# Patient Record
Sex: MALE | Race: WHITE | NOT HISPANIC OR LATINO | Employment: OTHER | URBAN - METROPOLITAN AREA
[De-identification: names, ages, dates, MRNs, and addresses within clinical notes are randomized per-mention and may not be internally consistent; named-entity substitution may affect disease eponyms.]

---

## 2020-09-01 ENCOUNTER — OFFICE VISIT (OUTPATIENT)
Dept: PODIATRY | Facility: CLINIC | Age: 55
End: 2020-09-01
Payer: COMMERCIAL

## 2020-09-01 VITALS — RESPIRATION RATE: 16 BRPM | HEIGHT: 70 IN | BODY MASS INDEX: 34.36 KG/M2 | WEIGHT: 240 LBS

## 2020-09-01 DIAGNOSIS — M21.961 ACQUIRED DEFORMITY OF BOTH FEET: Primary | ICD-10-CM

## 2020-09-01 DIAGNOSIS — M21.41 ACQUIRED FLAT FOOT, RIGHT: ICD-10-CM

## 2020-09-01 DIAGNOSIS — M21.962 ACQUIRED DEFORMITY OF BOTH FEET: Primary | ICD-10-CM

## 2020-09-01 DIAGNOSIS — M25.571 ARTHRALGIA OF RIGHT FOOT: ICD-10-CM

## 2020-09-01 DIAGNOSIS — M21.42 ACQUIRED FLAT FOOT, LEFT: ICD-10-CM

## 2020-09-01 PROCEDURE — 99203 OFFICE O/P NEW LOW 30 MIN: CPT | Performed by: PODIATRIST

## 2020-09-01 PROCEDURE — L3000 FT INSERT UCB BERKELEY SHELL: HCPCS | Performed by: PODIATRIST

## 2020-09-01 PROCEDURE — 73620 X-RAY EXAM OF FOOT: CPT | Performed by: PODIATRIST

## 2020-09-01 RX ORDER — MELOXICAM 7.5 MG/1
7.5 TABLET ORAL DAILY
Qty: 10 TABLET | Refills: 0 | Status: SHIPPED | OUTPATIENT
Start: 2020-09-01 | End: 2021-11-15 | Stop reason: ALTCHOICE

## 2020-09-01 NOTE — PROGRESS NOTES
Assessment/Plan:  Metatarsalgia  Arthrosis  Pronation syndrome  Pain upon ambulation  Right 1st MPJ osteoarthritis  Plan  Foot exam performed  X-rays taken  Patient educated on condition  Patient be placed on Mobic  His feet have been casted for custom molded foot orthotics         Diagnoses and all orders for this visit:    Acquired deformity of both feet    Acquired flat foot, right    Acquired flat foot, left    Arthralgia of right foot  -     meloxicam (MOBIC) 7 5 mg tablet; Take 1 tablet (7 5 mg total) by mouth daily for 10 days          Subjective:  Patient presents for evaluation treatment of painful right foot  He has stiffness in his big toe joint  However over the last several weeks has had increasing pain  He also gets pain in his legs when he walks  No Known Allergies      Current Outpatient Medications:     meloxicam (MOBIC) 7 5 mg tablet, Take 1 tablet (7 5 mg total) by mouth daily for 10 days, Disp: 10 tablet, Rfl: 0    There is no problem list on file for this patient  Patient ID: Opal Felton is a 60 y o  male  HPI    The following portions of the patient's history were reviewed and updated as appropriate:     family history is not on file  reports that he has never smoked  He has never used smokeless tobacco  No history on file for alcohol and drug  Vitals:    09/01/20 0940   Resp: 16       Review of Systems      Objective:  Patient's shoes and socks removed     Foot Exam    General  General Appearance: appears stated age and healthy   Orientation: alert and oriented to person, place, and time   Affect: appropriate   Gait: antalgic       Right Foot/Ankle     Inspection and Palpation  Ecchymosis: none  Tenderness: bony tenderness, great toe metatarsophalangeal joint, lesser metatarsophalangeal joints and metatarsals   Swelling: dorsum and metatarsals   Arch: pes planus  Hammertoes: fifth toe  Hallux valgus: no  Hallux limitus: yes  Skin Exam: dry skin; Neurovascular  Dorsalis pedis: 3+  Posterior tibial: 3+      Left Foot/Ankle      Inspection and Palpation  Ecchymosis: none  Tenderness: metatarsals   Swelling: dorsum and metatarsals   Arch: pes planus  Hammertoes: fifth toe  Hallux valgus: no  Hallux limitus: yes    Neurovascular  Dorsalis pedis: 3+  Posterior tibial: 3+        Physical Exam  Vitals signs and nursing note reviewed  Cardiovascular:      Rate and Rhythm: Normal rate and regular rhythm  Pulses:           Dorsalis pedis pulses are 3+ on the right side and 3+ on the left side  Posterior tibial pulses are 3+ on the right side and 3+ on the left side  Musculoskeletal:      Right foot: Bony tenderness present  No bunion  Left foot: No bunion  Comments: Patient is maximally pronated in stance and gait  He has metatarsus adductus  He has decreased range of motion of the 1st MPJ bilateral   Right 1st MPJ demonstrates edema    X-ray  No evidence of fracture  There is osteoarthritis of the 1st MPJ bilateral   Bipartite sesamoids noted   Feet:      Right foot:      Skin integrity: Dry skin present  Skin:     Capillary Refill: Capillary refill takes less than 2 seconds  Neurological:      Mental Status: He is alert  Psychiatric:         Mood and Affect: Mood normal          Behavior: Behavior normal          Thought Content:  Thought content normal          Judgment: Judgment normal

## 2021-11-15 ENCOUNTER — APPOINTMENT (OUTPATIENT)
Dept: RADIOLOGY | Facility: CLINIC | Age: 56
End: 2021-11-15
Payer: COMMERCIAL

## 2021-11-15 VITALS
DIASTOLIC BLOOD PRESSURE: 73 MMHG | SYSTOLIC BLOOD PRESSURE: 120 MMHG | BODY MASS INDEX: 34.36 KG/M2 | HEIGHT: 70 IN | WEIGHT: 240 LBS | HEART RATE: 43 BPM

## 2021-11-15 DIAGNOSIS — M54.50 ACUTE BILATERAL LOW BACK PAIN WITHOUT SCIATICA: Primary | ICD-10-CM

## 2021-11-15 DIAGNOSIS — M54.50 LOW BACK PAIN, UNSPECIFIED BACK PAIN LATERALITY, UNSPECIFIED CHRONICITY, UNSPECIFIED WHETHER SCIATICA PRESENT: ICD-10-CM

## 2021-11-15 PROCEDURE — 99204 OFFICE O/P NEW MOD 45 MIN: CPT | Performed by: ORTHOPAEDIC SURGERY

## 2021-11-15 PROCEDURE — 72100 X-RAY EXAM L-S SPINE 2/3 VWS: CPT

## 2021-11-15 RX ORDER — NAPROXEN 500 MG/1
500 TABLET ORAL 2 TIMES DAILY WITH MEALS
Qty: 60 TABLET | Refills: 0 | Status: SHIPPED | OUTPATIENT
Start: 2021-11-15 | End: 2022-02-25

## 2021-11-15 RX ORDER — CYCLOBENZAPRINE HCL 10 MG
10 TABLET ORAL 3 TIMES DAILY PRN
Qty: 20 TABLET | Refills: 0 | Status: SHIPPED | OUTPATIENT
Start: 2021-11-15 | End: 2022-02-25

## 2021-11-23 ENCOUNTER — EVALUATION (OUTPATIENT)
Dept: PHYSICAL THERAPY | Facility: CLINIC | Age: 56
End: 2021-11-23
Payer: COMMERCIAL

## 2021-11-23 DIAGNOSIS — M54.50 LOW BACK PAIN, UNSPECIFIED BACK PAIN LATERALITY, UNSPECIFIED CHRONICITY, UNSPECIFIED WHETHER SCIATICA PRESENT: Primary | ICD-10-CM

## 2021-11-23 PROCEDURE — 97112 NEUROMUSCULAR REEDUCATION: CPT

## 2021-11-23 PROCEDURE — 97161 PT EVAL LOW COMPLEX 20 MIN: CPT

## 2021-11-30 ENCOUNTER — OFFICE VISIT (OUTPATIENT)
Dept: PHYSICAL THERAPY | Facility: CLINIC | Age: 56
End: 2021-11-30
Payer: COMMERCIAL

## 2021-11-30 DIAGNOSIS — M54.50 LOW BACK PAIN, UNSPECIFIED BACK PAIN LATERALITY, UNSPECIFIED CHRONICITY, UNSPECIFIED WHETHER SCIATICA PRESENT: Primary | ICD-10-CM

## 2021-11-30 PROCEDURE — 97112 NEUROMUSCULAR REEDUCATION: CPT

## 2021-11-30 PROCEDURE — 97110 THERAPEUTIC EXERCISES: CPT

## 2021-12-02 ENCOUNTER — OFFICE VISIT (OUTPATIENT)
Dept: PHYSICAL THERAPY | Facility: CLINIC | Age: 56
End: 2021-12-02
Payer: COMMERCIAL

## 2021-12-02 DIAGNOSIS — M54.50 LOW BACK PAIN, UNSPECIFIED BACK PAIN LATERALITY, UNSPECIFIED CHRONICITY, UNSPECIFIED WHETHER SCIATICA PRESENT: Primary | ICD-10-CM

## 2021-12-02 PROCEDURE — 97110 THERAPEUTIC EXERCISES: CPT

## 2021-12-02 PROCEDURE — 97112 NEUROMUSCULAR REEDUCATION: CPT

## 2021-12-07 ENCOUNTER — APPOINTMENT (OUTPATIENT)
Dept: PHYSICAL THERAPY | Facility: CLINIC | Age: 56
End: 2021-12-07
Payer: COMMERCIAL

## 2021-12-09 ENCOUNTER — APPOINTMENT (OUTPATIENT)
Dept: PHYSICAL THERAPY | Facility: CLINIC | Age: 56
End: 2021-12-09
Payer: COMMERCIAL

## 2021-12-16 ENCOUNTER — APPOINTMENT (OUTPATIENT)
Dept: PHYSICAL THERAPY | Facility: CLINIC | Age: 56
End: 2021-12-16
Payer: COMMERCIAL

## 2021-12-21 ENCOUNTER — APPOINTMENT (OUTPATIENT)
Dept: PHYSICAL THERAPY | Facility: CLINIC | Age: 56
End: 2021-12-21
Payer: COMMERCIAL

## 2021-12-23 ENCOUNTER — APPOINTMENT (OUTPATIENT)
Dept: PHYSICAL THERAPY | Facility: CLINIC | Age: 56
End: 2021-12-23
Payer: COMMERCIAL

## 2022-01-11 ENCOUNTER — OFFICE VISIT (OUTPATIENT)
Dept: OBGYN CLINIC | Facility: CLINIC | Age: 57
End: 2022-01-11
Payer: COMMERCIAL

## 2022-01-11 VITALS
SYSTOLIC BLOOD PRESSURE: 170 MMHG | HEART RATE: 75 BPM | HEIGHT: 70 IN | WEIGHT: 240 LBS | BODY MASS INDEX: 34.36 KG/M2 | DIASTOLIC BLOOD PRESSURE: 70 MMHG

## 2022-01-11 DIAGNOSIS — M54.50 ACUTE BILATERAL LOW BACK PAIN WITHOUT SCIATICA: Primary | ICD-10-CM

## 2022-01-11 PROCEDURE — 99213 OFFICE O/P EST LOW 20 MIN: CPT | Performed by: ORTHOPAEDIC SURGERY

## 2022-01-11 NOTE — PROGRESS NOTES
Assessment/Plan:  1  Acute bilateral low back pain without sciatica         Radha Bills has improvement in his low back pain and no significant signs of radiculopathy on examination today  I do think with how well he is doing he may not need any further imaging or treatment  He has no signs of radiculopathy on examination today  If his pain would persist or worsen then we could consider MRI however at this point I do think he should continue with home exercises and core strengthening  He should remain physically active  If the pain would persist or worsen he will follow up in the office for MRI if needed  Subjective:   Deepti Petit is a 59 y o  male who presents to the office for follow-up for low back pain  He was last in the office 6 weeks ago with discomfort in his low back and right-sided lumbar radiculopathy  He had a history of disc herniation in the past   We start him in formal physical therapy and after about 3 sessions of PT he states he felt better and he was not experiencing severe discomfort down his legs  He has not continued with physical therapy and states that feels relatively normal today  He denies any ongoing pain that shoots down his legs  He does have residual numbness in his right calf that has been present for 6 years which she has always attributed to his back  He denies any new injury  Review of Systems   Constitutional: Negative for chills, fever and unexpected weight change  HENT: Negative for hearing loss, nosebleeds and sore throat  Eyes: Negative for pain, redness and visual disturbance  Respiratory: Negative for cough, shortness of breath and wheezing  Cardiovascular: Negative for chest pain, palpitations and leg swelling  Gastrointestinal: Negative for abdominal pain, nausea and vomiting  Endocrine: Negative for polyphagia and polyuria  Genitourinary: Negative for dysuria and hematuria     Musculoskeletal:        See HPI   Skin: Negative for rash and wound    Neurological: Negative for dizziness, numbness and headaches  Psychiatric/Behavioral: Negative for decreased concentration and suicidal ideas  The patient is not nervous/anxious  History reviewed  No pertinent past medical history  History reviewed  No pertinent surgical history  History reviewed  No pertinent family history  Social History     Occupational History    Not on file   Tobacco Use    Smoking status: Never Smoker    Smokeless tobacco: Never Used   Vaping Use    Vaping Use: Never used   Substance and Sexual Activity    Alcohol use: Not on file    Drug use: Not on file    Sexual activity: Not on file         Current Outpatient Medications:     cyclobenzaprine (FLEXERIL) 10 mg tablet, Take 1 tablet (10 mg total) by mouth 3 (three) times a day as needed for muscle spasms, Disp: 20 tablet, Rfl: 0    naproxen (NAPROSYN) 500 mg tablet, Take 1 tablet (500 mg total) by mouth 2 (two) times a day with meals, Disp: 60 tablet, Rfl: 0    No Known Allergies    Objective:  Vitals:    01/11/22 1224   BP: 170/70   Pulse: 75       Back Exam     Tenderness   The patient is experiencing no tenderness  Range of Motion   Extension: normal   Flexion: normal     Muscle Strength   Right Quadriceps:  5/5   Left Quadriceps:  5/5   Right Hamstrings:  5/5   Left Hamstrings:  5/5     Tests   Straight leg raise left: negative    Other   Sensation: normal  Gait: normal             Physical Exam  Vitals reviewed  Constitutional:       Appearance: He is well-developed  HENT:      Head: Normocephalic and atraumatic  Eyes:      Conjunctiva/sclera: Conjunctivae normal       Pupils: Pupils are equal, round, and reactive to light  Cardiovascular:      Rate and Rhythm: Normal rate  Pulmonary:      Effort: Pulmonary effort is normal  No respiratory distress  Musculoskeletal:      Cervical back: Normal range of motion and neck supple        Lumbar back: Negative left straight leg raise test  Comments: As noted in HPI   Skin:     General: Skin is warm and dry  Neurological:      Mental Status: He is alert and oriented to person, place, and time     Psychiatric:         Behavior: Behavior normal

## 2022-02-25 ENCOUNTER — HOSPITAL ENCOUNTER (EMERGENCY)
Facility: HOSPITAL | Age: 57
Discharge: HOME | End: 2022-02-25
Attending: EMERGENCY MEDICINE
Payer: COMMERCIAL

## 2022-02-25 ENCOUNTER — APPOINTMENT (EMERGENCY)
Dept: RADIOLOGY | Facility: HOSPITAL | Age: 57
End: 2022-02-25
Payer: COMMERCIAL

## 2022-02-25 VITALS
RESPIRATION RATE: 18 BRPM | HEART RATE: 66 BPM | WEIGHT: 250 LBS | OXYGEN SATURATION: 99 % | BODY MASS INDEX: 35.87 KG/M2 | DIASTOLIC BLOOD PRESSURE: 73 MMHG | TEMPERATURE: 97.9 F | SYSTOLIC BLOOD PRESSURE: 119 MMHG

## 2022-02-25 DIAGNOSIS — R10.9 LEFT FLANK PAIN: Primary | ICD-10-CM

## 2022-02-25 LAB
BACTERIA UR QL AUTO: ABNORMAL /HPF
BILIRUB UR QL STRIP: NEGATIVE
CLARITY UR: CLEAR
COLOR UR: YELLOW
GLUCOSE UR STRIP-MCNC: NEGATIVE MG/DL
HGB UR QL STRIP.AUTO: ABNORMAL
KETONES UR STRIP-MCNC: NEGATIVE MG/DL
LEUKOCYTE ESTERASE UR QL STRIP: NEGATIVE
MUCOUS THREADS UR QL AUTO: ABNORMAL
NITRITE UR QL STRIP: NEGATIVE
NON-SQ EPI CELLS URNS QL MICRO: ABNORMAL /HPF
PH UR STRIP.AUTO: 5.5 [PH]
PROT UR STRIP-MCNC: NEGATIVE MG/DL
RBC #/AREA URNS AUTO: ABNORMAL /HPF
SP GR UR STRIP.AUTO: 1.02 (ref 1–1.03)
UROBILINOGEN UR QL STRIP.AUTO: 0.2 E.U./DL
WBC #/AREA URNS AUTO: ABNORMAL /HPF

## 2022-02-25 PROCEDURE — 74176 CT ABD & PELVIS W/O CONTRAST: CPT

## 2022-02-25 PROCEDURE — 96374 THER/PROPH/DIAG INJ IV PUSH: CPT

## 2022-02-25 PROCEDURE — 81001 URINALYSIS AUTO W/SCOPE: CPT | Performed by: EMERGENCY MEDICINE

## 2022-02-25 PROCEDURE — 99284 EMERGENCY DEPT VISIT MOD MDM: CPT

## 2022-02-25 PROCEDURE — 96375 TX/PRO/DX INJ NEW DRUG ADDON: CPT

## 2022-02-25 PROCEDURE — 99285 EMERGENCY DEPT VISIT HI MDM: CPT | Performed by: EMERGENCY MEDICINE

## 2022-02-25 RX ORDER — MORPHINE SULFATE 4 MG/ML
4 INJECTION, SOLUTION INTRAMUSCULAR; INTRAVENOUS ONCE
Status: COMPLETED | OUTPATIENT
Start: 2022-02-25 | End: 2022-02-25

## 2022-02-25 RX ORDER — MORPHINE SULFATE 15 MG/1
15 TABLET ORAL EVERY 6 HOURS PRN
Qty: 6 TABLET | Refills: 0 | Status: SHIPPED | OUTPATIENT
Start: 2022-02-25 | End: 2022-03-01

## 2022-02-25 RX ORDER — ONDANSETRON 2 MG/ML
4 INJECTION INTRAMUSCULAR; INTRAVENOUS ONCE
Status: COMPLETED | OUTPATIENT
Start: 2022-02-25 | End: 2022-02-25

## 2022-02-25 RX ORDER — TAMSULOSIN HYDROCHLORIDE 0.4 MG/1
0.4 CAPSULE ORAL
Qty: 5 CAPSULE | Refills: 0 | Status: SHIPPED | OUTPATIENT
Start: 2022-02-25 | End: 2022-03-01

## 2022-02-25 RX ORDER — CEPHALEXIN 500 MG/1
500 CAPSULE ORAL EVERY 6 HOURS SCHEDULED
Qty: 20 CAPSULE | Refills: 0 | Status: SHIPPED | OUTPATIENT
Start: 2022-02-25 | End: 2022-03-01

## 2022-02-25 RX ORDER — KETOROLAC TROMETHAMINE 30 MG/ML
15 INJECTION, SOLUTION INTRAMUSCULAR; INTRAVENOUS ONCE
Status: COMPLETED | OUTPATIENT
Start: 2022-02-25 | End: 2022-02-25

## 2022-02-25 RX ORDER — KETOROLAC TROMETHAMINE 30 MG/ML
15 INJECTION, SOLUTION INTRAMUSCULAR; INTRAVENOUS ONCE
Status: DISCONTINUED | OUTPATIENT
Start: 2022-02-25 | End: 2022-02-25

## 2022-02-25 RX ADMIN — KETOROLAC TROMETHAMINE 15 MG: 30 INJECTION, SOLUTION INTRAMUSCULAR at 00:53

## 2022-02-25 RX ADMIN — ONDANSETRON 4 MG: 2 INJECTION INTRAMUSCULAR; INTRAVENOUS at 00:53

## 2022-02-25 RX ADMIN — MORPHINE SULFATE 4 MG: 4 INJECTION INTRAVENOUS at 00:53

## 2022-02-25 NOTE — ED NOTES
Patient sent home with urine strainers and specimen cup  Verbalizes understanding about discharge instructions        Gabino Portillo [facility]  02/25/22 7504

## 2022-02-27 NOTE — ED PROVIDER NOTES
Final Diagnosis:  1  Left flank pain        Chief Complaint   Patient presents with    Flank Pain     patient c/o left side flank pain radiating to front and groin, denies difficulty urinating or blood in urine     HPI  Patient had sudden-onset left flank pain radiating to his left groin  He does not have any testicular involvement no scrotal change  No gross blood in his urine no fever no nausea vomiting and a change in the character of his urine  He sitting on the a the edge of bed uncomfortable exam is like a kidney stone  - No language barrier    - History obtained from patient  - There are no limitations to the history obtained  - Previous charting underwent limited review with attention to last ED visits, labs, ekgs, and prior imaging  PMH:   has no past medical history on file  PSH:   has a past surgical history that includes Biceps tendon repair and Rotator cuff repair  Social History:  noncontributory    ROS:    Pertinent positives/negatives:   Review of Systems   Gastrointestinal: Negative for abdominal pain, constipation, diarrhea, nausea and vomiting  Genitourinary: Positive for flank pain  Negative for dysuria, frequency and hematuria  CONSTITUTIONAL:  No dizziness  No weakness  No unexpected weight loss  EYES:  No pain, erythema, or discharge  No loss of vision  ENT:  No tinnitus, decreased hearing  No epistaxis/purulent drainage  No voice change, airway closing, trismus  CARDIOVASCULAR:  No chest pain  No palpitations  No new lower extremity edema  RESPIRATORY:  No purulent cough  No hemoptysis  No dyspnea  No paroxysmal nocturnal dyspnea  No stridor, audible wheezing bedside  MUSCULOSKELETAL:  No arthralgias or myalgias that are new  INTEGUMENTARY:  No swelling  No unexpected contusions  No abrasions  No lymphangitis  NEUROLOGIC:  No meningismus  No numbness of the extremities  No new focal weakness   No postural instability  PSYCHIATRIC:  No SI HI AVH  HEMATOLOGICAL:  No bleeding  No petechiae  No bruising  ALLERGIES:  No urticaria  No sudden abd cramping  No stridor  PE:     Physical exam highlights:   Physical Exam       Vitals:    02/25/22 0017 02/25/22 0215   BP: (!) 174/97 119/73   BP Location: Right arm Left arm   Pulse: 70 66   Resp: 20 18   Temp: 97 9 °F (36 6 °C)    TempSrc: Oral    SpO2: 99% 99%   Weight: 113 kg (250 lb)      Vitals reviewed by me  Nursing note reviewed  Chaperone present for all sensitive exam   Const: No acute distress  Alert  Nontoxic  Not diaphoretic  HEENT: External ears normal  No protrusion drainage swelling  Nose normal  No drainage/traumatic deformity  MMM  Mouth with baseline/symmetric movement  No trismus  Eyes: No squinting  No icterus  Tracks through the room with normal EOM  No tearing/swelling/drainage  Neck: ROM normal  No rigidity  No meningismus  Cards: Rate as per vitals  Compared to monitor sinus unless documented above  Regular  Well perfused  Pulm: able to verbalize without additional effort  Effort and excursion normal  No disress  No audible wheezing/ stridor  Normal resp rate  Abd: No distension beyond baseline  No fluctuant wave  Patient without peritoneal pain with shifting/bumping the bed  MSK: ROM normal and baseline  No deformity  Skin: No new rashes visible  Well perfused  Neuro: Nonfocal  Baseline  CN grossly intact  Moving all four with coordination  Psych: Normal behavior and affect  A:  - Nursing note reviewed  Ddx and MDM  Urolithiasis likely    Will eval for UTI                    CT renal stone study abdomen pelvis wo contrast   Final Result      1 mm calculus in distal left ureter just prior to the ureterovesicular junction with associated mild left hydroureteronephrosis           Workstation performed: [id number]           Orders Placed This Encounter   Procedures    CT renal stone study abdomen pelvis wo contrast    UA w Reflex to Microscopic w Reflex to Culture  Urine Microscopic     Labs Reviewed   UA W REFLEX TO MICROSCOPIC WITH REFLEX TO CULTURE - Abnormal       Result Value Ref Range Status    Color, UA Yellow   Final    Clarity, UA Clear   Final    Specific Gravity, UA 1 025  1 000 - 1 030 Final    pH, UA 5 5  5 0, 5 5, 6 0, 6 5, 7 0, 7 5, 8 0, 8 5, 9 0 Final    Leukocytes, UA Negative  Negative Final    Nitrite, UA Negative  Negative Final    Protein, UA Negative  Negative mg/dl Final    Glucose, UA Negative  Negative mg/dl Final    Ketones, UA Negative  Negative mg/dl Final    Urobilinogen, UA 0 2  0 2, 1 0 E U /dl E U /dl Final    Bilirubin, UA Negative  Negative Final    Blood, UA Large (*) Negative Final   URINE MICROSCOPIC - Abnormal    RBC, UA 20-30 (*) None Seen, 0-1, 1-2, 2-4, 0-5 /hpf Final    WBC, UA 0-1  None Seen, 0-1, 1-2, 0-5, 2-4 /hpf Final    Epithelial Cells None Seen  None Seen, Occasional /hpf Final    Bacteria, UA Innumerable (*) None Seen, Occasional /hpf Final    MUCUS THREADS Occasional (*) None Seen Final       Final Diagnosis:  1  Left flank pain        P:  - hospital tx includes   Medications   morphine (PF) 4 mg/mL injection 4 mg (4 mg Intravenous Given 2/25/22 0053)   ondansetron (ZOFRAN) injection 4 mg (4 mg Intravenous Given 2/25/22 0053)   ketorolac (TORADOL) injection 15 mg (15 mg Intravenous Given 2/25/22 0053)         - disposition  Time reflects when diagnosis was documented in both MDM as applicable and the Disposition within this note     Time User Action Codes Description Comment    2/25/2022  2:00 AM Carvel Sit Add [R10 9] Left flank pain       ED Disposition     ED Disposition Condition Date/Time Comment    Discharge Stable Fri Feb 25, 2022  2:00 AM Pro 45 discharge to home/self care              Follow-up Information     Follow up With Specialties Details Why Contact Info    Marcus Newman MD Urology   [address]  [phone]            - patient will call their PCP to let them know they were in the emergency department  We discuss return precautions       - additional tx intended, if consistent with primary provider:  - patient to follow with :      Discharge Medication List as of 2/25/2022  2:05 AM      START taking these medications    Details   cephalexin (KEFLEX) 500 mg capsule Take 1 capsule (500 mg total) by mouth every 6 (six) hours for 5 days, Starting Fri 2/25/2022, Until Wed 3/2/2022, Normal      morphine (MSIR) 15 mg tablet Take 1 tablet (15 mg total) by mouth every 6 (six) hours as needed for severe pain for up to 5 days Max Daily Amount: 60 mg, Starting Fri 2/25/2022, Until Wed 3/2/2022 at 2359, Print      tamsulosin (FLOMAX) 0 4 mg Take 1 capsule (0 4 mg total) by mouth daily with dinner for 5 days, Starting Fri 2/25/2022, Until Wed 3/2/2022, Normal           No discharge procedures on file  None       Portions of the record may have been created with voice recognition software  Occasional wrong word or "sound a like" substitutions may have occurred due to the inherent limitations of voice recognition software  Read the chart carefully and recognize, using context, where substitutions have occurred      Electronically signed by:  MD Brittany Black MD  02/27/22 5317

## 2022-03-01 ENCOUNTER — OFFICE VISIT (OUTPATIENT)
Dept: FAMILY MEDICINE CLINIC | Facility: CLINIC | Age: 57
End: 2022-03-01
Payer: COMMERCIAL

## 2022-03-01 VITALS
DIASTOLIC BLOOD PRESSURE: 80 MMHG | WEIGHT: 260 LBS | RESPIRATION RATE: 16 BRPM | TEMPERATURE: 97.5 F | HEIGHT: 69 IN | HEART RATE: 42 BPM | OXYGEN SATURATION: 98 % | BODY MASS INDEX: 38.51 KG/M2 | SYSTOLIC BLOOD PRESSURE: 120 MMHG

## 2022-03-01 DIAGNOSIS — Z11.4 SCREENING FOR HIV (HUMAN IMMUNODEFICIENCY VIRUS): ICD-10-CM

## 2022-03-01 DIAGNOSIS — R68.82 LOW LIBIDO: ICD-10-CM

## 2022-03-01 DIAGNOSIS — R00.1 BRADYCARDIA: ICD-10-CM

## 2022-03-01 DIAGNOSIS — Z23 NEED FOR VACCINATION: ICD-10-CM

## 2022-03-01 DIAGNOSIS — Z11.59 NEED FOR HEPATITIS C SCREENING TEST: ICD-10-CM

## 2022-03-01 DIAGNOSIS — Z13.6 SCREENING FOR CARDIOVASCULAR CONDITION: ICD-10-CM

## 2022-03-01 DIAGNOSIS — E66.01 SEVERE OBESITY (BMI 35.0-39.9) WITH COMORBIDITY (HCC): ICD-10-CM

## 2022-03-01 DIAGNOSIS — I44.0 1ST DEGREE AV BLOCK: ICD-10-CM

## 2022-03-01 DIAGNOSIS — Z12.5 SCREENING FOR PROSTATE CANCER: ICD-10-CM

## 2022-03-01 DIAGNOSIS — Z00.00 WELL ADULT EXAM: Primary | ICD-10-CM

## 2022-03-01 DIAGNOSIS — Z12.11 SCREEN FOR COLON CANCER: ICD-10-CM

## 2022-03-01 PROCEDURE — 99386 PREV VISIT NEW AGE 40-64: CPT | Performed by: FAMILY MEDICINE

## 2022-03-01 PROCEDURE — 3725F SCREEN DEPRESSION PERFORMED: CPT | Performed by: FAMILY MEDICINE

## 2022-03-01 PROCEDURE — 93000 ELECTROCARDIOGRAM COMPLETE: CPT | Performed by: FAMILY MEDICINE

## 2022-03-01 PROCEDURE — 90471 IMMUNIZATION ADMIN: CPT

## 2022-03-01 PROCEDURE — 90715 TDAP VACCINE 7 YRS/> IM: CPT

## 2022-03-01 NOTE — PATIENT INSTRUCTIONS
Obesity   AMBULATORY CARE:   Obesity  means your body mass index (BMI) is greater than 30  Your healthcare provider will use your height and weight to measure your BMI  The risks of obesity include  many health problems, including injuries or physical disability  · Diabetes (high blood sugar level)    · High blood pressure or high cholesterol    · Heart disease    · Stroke    · Gallbladder or liver disease    · Cancer of the colon, breast, prostate, liver, or kidney    · Sleep apnea    · Arthritis or gout    Screening  is done to check for health conditions before you have signs or symptoms  If you are 28to 79years old, your blood sugar level may be checked every 3 years for signs of prediabetes or diabetes  Your healthcare provider will check your blood pressure at each visit  High blood pressure can lead to a stroke or other problems  Your provider may check for signs of heart disease, cancer, or other health problems  Seek care immediately if:   · You have a severe headache, confusion, or difficulty speaking  · You have weakness on one side of your body  · You have chest pain, sweating, or shortness of breath  Call your doctor if:   · You have symptoms of gallbladder or liver disease, such as pain in your upper abdomen  · You have knee or hip pain and discomfort while walking  · You have symptoms of diabetes, such as intense hunger and thirst, and frequent urination  · You have symptoms of sleep apnea, such as snoring or daytime sleepiness  · You have questions or concerns about your condition or care  Treatment for obesity  focuses on helping you lose weight to improve your health  Even a small decrease in BMI can reduce the risk for many health problems  Your healthcare provider will help you set a weight-loss goal   · Lifestyle changes  are the first step in treating obesity  These include making healthy food choices and getting regular physical activity   Your healthcare provider may suggest a weight-loss program that involves coaching, education, and therapy  · Medicine  may help you lose weight when it is used with a healthy foods and physical activity  · Surgery  can help you lose weight if you are very obese and have other health problems  There are several types of weight-loss surgery  Ask your healthcare provider for more information  Tips for safe weight loss:   · Set small, realistic goals  An example of a small goal is to walk for 20 minutes 5 days a week  Anther goal is to lose 5% of your body weight  · Tell friends, family members, and coworkers about your goals  and ask for their support  Ask a friend to lose weight with you, or join a weight-loss support group  · Identify foods or triggers that may cause you to overeat , and find ways to avoid them  Remove tempting high-calorie foods from your home and workplace  Place a bowl of fresh fruit on your kitchen counter  If stress causes you to eat, then find other ways to cope with stress  A counselor or therapist may be able to help you  · Keep a diary to track what you eat and drink  Also write down how many minutes of physical activity you do each day  Weigh yourself once a week and record it in your diary  Eating changes: You will need to eat 500 to 1,000 fewer calories each day than you currently eat to lose 1 to 2 pounds a week  The following changes will help you cut calories:  · Eat smaller portions  Use small plates, no larger than 9 inches in diameter  Fill your plate half full of fruits and vegetables  Measure your food using measuring cups until you know what a serving size looks like  · Eat 3 meals and 1 or 2 snacks each day  Plan your meals in advance  [place] Ochoa and eat at home most of the time  Eat slowly  Do not skip meals  Skipping meals can lead to overeating later in the day  This can make it harder for you to lose weight   Talk with a dietitian to help you make a meal plan and schedule that is right for you  · Eat fruits and vegetables at every meal   They are low in calories and high in fiber, which makes you feel full  Do not add butter, margarine, or cream sauce to vegetables  Use herbs to season steamed vegetables  · Eat less fat and fewer fried foods  Eat more baked or grilled chicken and fish  These protein sources are lower in calories and fat than red meat  Limit fast food  Dress your salads with olive oil and vinegar instead of bottled dressing  · Limit the amount of sugar you eat  Do not drink sugary beverages  Limit alcohol  Activity changes:  Physical activity is good for your body in many ways  It helps you burn calories and build strong muscles  It decreases stress and depression, and improves your mood  It can also help you sleep better  Talk to your healthcare provider before you begin an exercise program   · Exercise for at least 30 minutes 5 days a week  Start slowly  Set aside time each day for physical activity that you enjoy and that is convenient for you  It is best to do both weight training and an activity that increases your heart rate, such as walking, bicycling, or swimming  · Find ways to be more active  Do yard work and housecleaning  Walk up the stairs instead of using elevators  Spend your leisure time going to events that require walking, such as outdoor festivals or fairs  This extra physical activity can help you lose weight and keep it off  Follow up with your doctor as directed: You may need to meet with a dietitian  Write down your questions so you remember to ask them during your visits  © Copyright [employer] 2022 Information is for End User's use only and may not be sold, redistributed or otherwise used for commercial purposes  All illustrations and images included in CareNotes® are the copyrighted property of [employer]  or [employer]   The above information is an  only   It is not intended as medical advice for individual conditions or treatments  Talk to your doctor, nurse or pharmacist before following any medical regimen to see if it is safe and effective for you

## 2022-03-01 NOTE — PROGRESS NOTES
[facility] HEALTH MAINTENANCE OFFICE VISIT  [facility] - [address]    NAME: Rafael Richter  AGE: 59 y o  SEX: male  : 1965     DATE: 3/1/2022    Assessment and Plan     1  Well adult exam    2  Screening for cardiovascular condition  -     Comprehensive metabolic panel; Future  -     Lipid Panel with Direct LDL reflex; Future  -     Comprehensive metabolic panel  -     Lipid Panel with Direct LDL reflex    3  Screening for prostate cancer  -     PSA, Total Screen; Future    4  Need for hepatitis C screening test  -     Hepatitis C antibody; Future  -     Hepatitis C antibody    5  Screening for HIV (human immunodeficiency virus)  -     HIV 1/2 Antigen/Antibody (4th Generation) w Reflex [facility]; Future  -     [facility]- Human Immunodeficiency Virus 1/2 Antigen / Antibody ( Fourth Generation) with Reflex Testing; Future    6  Need for vaccination  -     TDAP VACCINE GREATER THAN OR EQUAL TO 6YO IM    7  Severe obesity (BMI 35 0-39  9) with comorbidity ([facility] [address] )    8  BMI 38 0-38 9,adult    9  Screen for colon cancer  -     Ambulatory referral to Gastroenterology; Future    10  Bradycardia  -     POCT ECG  -     Ambulatory Referral to Cardiology; Future  -     TSH, 3rd generation; Future  -     TSH, 3rd generation  -     Testosterone, free, total; Future  -     Testosterone, free, total    11  1st degree AV block  -     Ambulatory Referral to Cardiology; Future  -     TSH, 3rd generation; Future  -     TSH, 3rd generation  -     Testosterone, free, total; Future  -     Testosterone, free, total    12   Low libido  -     Testosterone, free, total; Future  -     Testosterone, free, total    EKG sinus renetta no signs of ischemia, 1st degree block      · Patient Counseling:   · Nutrition: Stressed importance of a well balanced diet, moderation of sodium/saturated fat, caloric balance and sufficient intake of fiber  · Exercise: Stressed the importance of regular exercise with a goal of 150 minutes per week  · Dental Health: Discussed daily flossing and brushing and regular dental visits     · Immunizations reviewed: See Orders  · Discussed benefits of:  Colon Cancer Screening, Prostate Cancer Screening  and Screening labs   BMI Counseling: Body mass index is 38 96 kg/m²  Discussed with patient's BMI with him  The BMI is above normal  Nutrition recommendations include reducing portion sizes  No follow-ups on file  Chief Complaint     Chief Complaint   Patient presents with    Physical Exam     NPT [id number]       History of Present Illness     Pt is here for the first time    Pt states he notices his sexual drive is slowing down      Well Adult Physical   Patient here for a comprehensive physical exam       Diet and Physical Activity  Diet: well balanced diet  Exercise: never      Depression Screen  PHQ-2/9 Depression Screening    Little interest or pleasure in doing things: 0 - not at all  Feeling down, depressed, or hopeless: 0 - not at all  PHQ-2 Score: 0  PHQ-2 Interpretation: Negative depression screen          General Health  Hearing: Normal:  bilateral  Vision: previous LASIK surgery  Dental: regular dental visits    Reproductive Health  No issues       The following portions of the patient's history were reviewed and updated as appropriate: allergies, current medications, past family history, past medical history, past social history, past surgical history and problem list     Review of Systems     Review of Systems   Constitutional: Negative for activity change, appetite change, chills, diaphoresis, fatigue, fever and unexpected weight change  HENT: Negative for congestion, dental problem, ear pain, mouth sores, sinus pressure, sinus pain, sore throat and trouble swallowing  Eyes: Negative for photophobia, discharge and itching  Respiratory: Negative for apnea, chest tightness and shortness of breath      Cardiovascular: Negative for chest pain, palpitations and leg swelling  Gastrointestinal: Negative for abdominal distention, abdominal pain, blood in stool, nausea and vomiting  Endocrine: Negative for cold intolerance, heat intolerance, polydipsia, polyphagia and polyuria  Genitourinary: Negative for difficulty urinating  Musculoskeletal: Negative for arthralgias  Skin: Negative for color change and wound  Neurological: Negative for dizziness, syncope, speech difficulty and headaches  Hematological: Negative for adenopathy  Psychiatric/Behavioral: Negative for agitation and behavioral problems         Past Medical History     Past Medical History:   Diagnosis Date    Kidney stone        Past Surgical History     Past Surgical History:   Procedure Laterality Date    BICEPS TENDON REPAIR      INGUINAL HERNIA REPAIR      LASIK Bilateral     ROTATOR CUFF REPAIR         Social History     Social History     Socioeconomic History    Marital status: /Civil Union     Spouse name: None    Number of children: None    Years of education: None    Highest education level: None   Occupational History    None   Tobacco Use    Smoking status: Never Smoker    Smokeless tobacco: Never Used   Vaping Use    Vaping Use: Some days    Substances: THC   Substance and Sexual Activity    Alcohol use: Yes     Comment: rare    Drug use: Yes     Types: Marijuana    Sexual activity: None   Other Topics Concern    None   Social History Narrative    None     Social Determinants of Health     Financial Resource Strain: Not on file   Food Insecurity: Not on file   Transportation Needs: Not on file   Physical Activity: Not on file   Stress: Not on file   Social Connections: Not on file   Intimate Partner Violence: Not on file   Housing Stability: Not on file       Family History     Family History   Problem Relation Age of Onset    Hypertension Mother     Diabetes Mother     Hypertension Father     Mental illness Neg Hx        Current Medications     No current outpatient medications on file  Allergies     No Known Allergies    Objective     /80   Pulse (!) 42   Temp 97 5 °F (36 4 °C)   Resp 16   Ht 5' 8 5" (1 74 m)   Wt 118 kg (260 lb)   SpO2 98%   BMI 38 96 kg/m²      Physical Exam  Vitals and nursing note reviewed  Constitutional:       General: He is not in acute distress  Appearance: He is well-developed  He is not diaphoretic  HENT:      Head: Normocephalic and atraumatic  Right Ear: External ear normal       Left Ear: External ear normal       Nose: Nose normal       Mouth/Throat:      Pharynx: No oropharyngeal exudate  Eyes:      General: No scleral icterus  Right eye: No discharge  Left eye: No discharge  Pupils: Pupils are equal, round, and reactive to light  Neck:      Thyroid: No thyromegaly  Cardiovascular:      Rate and Rhythm: Normal rate  Heart sounds: Normal heart sounds  No murmur heard  Pulmonary:      Effort: Pulmonary effort is normal  No respiratory distress  Breath sounds: Normal breath sounds  No wheezing  Abdominal:      General: Bowel sounds are normal  There is no distension  Palpations: Abdomen is soft  There is no mass  Tenderness: There is no abdominal tenderness  There is no guarding or rebound  Genitourinary:     Prostate: Normal    Musculoskeletal:         General: Normal range of motion  Skin:     General: Skin is warm and dry  Findings: No erythema or rash  Neurological:      Mental Status: He is alert  Coordination: Coordination normal       Deep Tendon Reflexes: Reflexes normal    Psychiatric:         Behavior: Behavior normal             Visual Acuity Screening    Right eye Left eye Both eyes   Without correction: 20/20 20/20 20/20   With correction:              Aide Palencia DO  [address]  BMI Counseling: Body mass index is 38 96 kg/m²   The BMI is above normal  Nutrition recommendations include reducing portion sizes

## 2022-03-10 LAB — TSH SERPL DL<=0.005 MIU/L-ACNC: 2.38 UIU/ML (ref 0.45–4.5)

## 2022-03-11 ENCOUNTER — TELEPHONE (OUTPATIENT)
Dept: FAMILY MEDICINE CLINIC | Facility: CLINIC | Age: 57
End: 2022-03-11

## 2022-03-11 LAB
ALBUMIN SERPL-MCNC: 4.5 G/DL (ref 3.8–4.9)
ALBUMIN/GLOB SERPL: 2 {RATIO} (ref 1.2–2.2)
ALP SERPL-CCNC: 46 IU/L (ref 44–121)
ALT SERPL-CCNC: 25 IU/L (ref 0–44)
AST SERPL-CCNC: 20 IU/L (ref 0–40)
BILIRUB SERPL-MCNC: 0.7 MG/DL (ref 0–1.2)
BUN SERPL-MCNC: 16 MG/DL (ref 6–24)
BUN/CREAT SERPL: 13 (ref 9–20)
CALCIUM SERPL-MCNC: 9.3 MG/DL (ref 8.7–10.2)
CHLORIDE SERPL-SCNC: 100 MMOL/L (ref 96–106)
CHOLEST SERPL-MCNC: 227 MG/DL (ref 100–199)
CO2 SERPL-SCNC: 21 MMOL/L (ref 20–29)
CREAT SERPL-MCNC: 1.22 MG/DL (ref 0.76–1.27)
EGFR: 68 ML/MIN/1.73
GLOBULIN SER-MCNC: 2.3 G/DL (ref 1.5–4.5)
GLUCOSE SERPL-MCNC: 102 MG/DL (ref 65–99)
HCV AB S/CO SERPL IA: <0.1 S/CO RATIO (ref 0–0.9)
HDLC SERPL-MCNC: 47 MG/DL
HIV 1+2 AB+HIV1 P24 AG SERPL QL IA: NON REACTIVE
LDLC SERPL CALC-MCNC: 151 MG/DL (ref 0–99)
LDLC/HDLC SERPL: 3.2 RATIO (ref 0–3.6)
MICRODELETION SYND BLD/T FISH: NORMAL
MICRODELETION SYND BLD/T FISH: NORMAL
POTASSIUM SERPL-SCNC: 4.1 MMOL/L (ref 3.5–5.2)
PROT SERPL-MCNC: 6.8 G/DL (ref 6–8.5)
SL AMB VLDL CHOLESTEROL CALC: 29 MG/DL (ref 5–40)
SODIUM SERPL-SCNC: 139 MMOL/L (ref 134–144)
TESTOST FREE SERPL-MCNC: 9.8 PG/ML (ref 7.2–24)
TESTOST SERPL-MCNC: 525 NG/DL (ref 264–916)
TRIGL SERPL-MCNC: 161 MG/DL (ref 0–149)

## 2022-03-11 NOTE — TELEPHONE ENCOUNTER
Please call pt looks like his lipids are elevated  I would suggest low fat low cholesterol diet , We willf ollow numbers in 6 months    If still elevated would suggest meds at that time

## 2022-03-11 NOTE — TELEPHONE ENCOUNTER
I spoke with pt, he is aware to start a low fat, low cholesterol diet and to repeat labs in 6 months  Also aware of his results  No further action needed   Sanjuanita Veloz LPN

## 2022-03-24 ENCOUNTER — OFFICE VISIT (OUTPATIENT)
Dept: GASTROENTEROLOGY | Facility: CLINIC | Age: 57
End: 2022-03-24
Payer: COMMERCIAL

## 2022-03-24 VITALS
BODY MASS INDEX: 38.51 KG/M2 | SYSTOLIC BLOOD PRESSURE: 133 MMHG | WEIGHT: 260 LBS | TEMPERATURE: 97.6 F | HEIGHT: 69 IN | HEART RATE: 64 BPM | OXYGEN SATURATION: 99 % | DIASTOLIC BLOOD PRESSURE: 79 MMHG

## 2022-03-24 DIAGNOSIS — Z12.11 SCREEN FOR COLON CANCER: ICD-10-CM

## 2022-03-24 PROCEDURE — 99204 OFFICE O/P NEW MOD 45 MIN: CPT | Performed by: INTERNAL MEDICINE

## 2022-03-24 PROCEDURE — 1036F TOBACCO NON-USER: CPT | Performed by: INTERNAL MEDICINE

## 2022-03-24 PROCEDURE — 3008F BODY MASS INDEX DOCD: CPT | Performed by: INTERNAL MEDICINE

## 2022-03-24 NOTE — PATIENT INSTRUCTIONS
Scheduled date of colonoscopy (as of today): 05/06/22  Physician performing colonoscopy: REBA  Location of colonoscopy: [facility]  Bowel prep reviewed with patient: SUTAB  Instructions reviewed with patient by: VIRGINIA  Clearances: VIKY

## 2022-03-24 NOTE — LETTER
March 24, 2022     Silvano Erickson DO  [address]  [address] [facility] [address]    Patient: Gladys Villanueva   YOB: 1965   Date of Visit: 3/24/2022       Dear Dr Mika Thakkar:    Thank you for referring Daily Heard to me for evaluation  Below are my notes for this consultation  If you have questions, please do not hesitate to call me  I look forward to following your patient along with you  Sincerely,        Gian Asher MD        CC: No Recipients  Gian Asher MD  3/24/2022 10:12 AM  Sign when Signing Visit  Consultation - [address] [facility]  Gladys Villanueva 59 y o  male MRN: [id number]  Unit/Bed#:  Encounter: [id number]        Consults    ASSESSMENT/PLAN:       1  Colon cancer screening-average risk, no prior colonoscopy  No change in bowel habits or hematochezia  No family history of colon cancer     -patient is not on any antiplatelets or anticoagulants     -will schedule average risk screening colonoscopy at this time  - sutab sample and instructions given  -Patient was explained about  the risks and benefits of the procedure  Risks including but not limited to bleeding, infection, perforation were explained in detail  Also explained about less than 100% sensitivity with the exam and other alternatives  ______________________________________________________________________    Reason for Consult / Principal Problem: [unfilled]    HPI: Gladys Villanueva is a 59y o  year old male with history of HLD, presents with colon cancer screening  He has never had a colonoscopy, denies any change in bowel habits, hematochezia, abdominal pain or unintentional weight loss  He denies any symptoms of acid reflux, dysphagia, odynophagia, loss of appetite, early satiety, hematemesis, coffee-ground emesis or melena  No family history of colon cancer  Patient is not on any antiplatelets or anticoagulants      Labs are reviewed and are notable for normal liver enzymes, normal creatinine,    Review of Systems: The remainder of the review of systems was negative except for the pertinent positives noted in HPI  Historical Information   Past Medical History:   Diagnosis Date    Hyperlipidemia     Kidney stone      Past Surgical History:   Procedure Laterality Date    BICEPS TENDON REPAIR      INGUINAL HERNIA REPAIR      LASIK Bilateral     ROTATOR CUFF REPAIR       Social History   Social History     Substance and Sexual Activity   Alcohol Use Not Currently    Comment: rare     Social History     Substance and Sexual Activity   Drug Use Yes    Types: Marijuana     Social History     Tobacco Use   Smoking Status Never Smoker   Smokeless Tobacco Never Used     Family History   Problem Relation Age of Onset    Hypertension Mother     Diabetes Mother     Hypertension Father     Colon polyps Father     Mental illness Neg Hx        Meds/Allergies     (Not in a hospital admission)    No current facility-administered medications for this visit  No Known Allergies    Objective     Blood pressure 133/79, pulse 64, temperature 97 6 °F (36 4 °C), height 5' 8 5" (1 74 m), weight 118 kg (260 lb), SpO2 99 %  [unfilled]    PHYSICAL EXAM     GEN: well nourished, well developed, no acute distress  HEENT: anicteric, MMM, no cervical or supraclavicular lymphadenopathy  CV: RRR, no m/r/g  CHEST: CTA b/l, no WRR  ABD: +BS, soft, NT/ND, no hepatosplenomegaly  EXT: no c/c/e  SKIN: no rashes,  NEURO: aaox3    Lab Results:   No visits with results within 1 Day(s) from this visit     Latest known visit with results is:   Orders Only on 03/10/2022   Component Date Value    HIV Screen 4th Generatio* 03/10/2022 Non Reactive      Imaging Studies: I have personally reviewed pertinent films in PACS

## 2022-03-24 NOTE — PROGRESS NOTES
Consultation - [address] [facility]  Jos Martinez 59 y o  male MRN: [id number]  Unit/Bed#:  Encounter: [id number]        Consults    ASSESSMENT/PLAN:       1  Colon cancer screening-average risk, no prior colonoscopy  No change in bowel habits or hematochezia  No family history of colon cancer     -patient is not on any antiplatelets or anticoagulants     -will schedule average risk screening colonoscopy at this time  - sutab sample and instructions given  -Patient was explained about  the risks and benefits of the procedure  Risks including but not limited to bleeding, infection, perforation were explained in detail  Also explained about less than 100% sensitivity with the exam and other alternatives  ______________________________________________________________________    Reason for Consult / Principal Problem: [unfilled]    HPI: Jos Martinez is a 59y o  year old male with history of HLD, presents with colon cancer screening  He has never had a colonoscopy, denies any change in bowel habits, hematochezia, abdominal pain or unintentional weight loss  He denies any symptoms of acid reflux, dysphagia, odynophagia, loss of appetite, early satiety, hematemesis, coffee-ground emesis or melena  No family history of colon cancer  Patient is not on any antiplatelets or anticoagulants  Labs are reviewed and are notable for normal liver enzymes, normal creatinine,    Review of Systems: The remainder of the review of systems was negative except for the pertinent positives noted in HPI       Historical Information   Past Medical History:   Diagnosis Date    Hyperlipidemia     Kidney stone      Past Surgical History:   Procedure Laterality Date    BICEPS TENDON REPAIR      INGUINAL HERNIA REPAIR      LASIK Bilateral     ROTATOR CUFF REPAIR       Social History   Social History     Substance and Sexual Activity   Alcohol Use Not Currently    Comment: rare     Social History     Substance and Sexual Activity   Drug Use Yes    Types: Marijuana     Social History     Tobacco Use   Smoking Status Never Smoker   Smokeless Tobacco Never Used     Family History   Problem Relation Age of Onset    Hypertension Mother     Diabetes Mother     Hypertension Father     Colon polyps Father     Mental illness Neg Hx        Meds/Allergies     (Not in a hospital admission)    No current facility-administered medications for this visit  No Known Allergies    Objective     Blood pressure 133/79, pulse 64, temperature 97 6 °F (36 4 °C), height 5' 8 5" (1 74 m), weight 118 kg (260 lb), SpO2 99 %  [unfilled]    PHYSICAL EXAM     GEN: well nourished, well developed, no acute distress  HEENT: anicteric, MMM, no cervical or supraclavicular lymphadenopathy  CV: RRR, no m/r/g  CHEST: CTA b/l, no WRR  ABD: +BS, soft, NT/ND, no hepatosplenomegaly  EXT: no c/c/e  SKIN: no rashes,  NEURO: aaox3    Lab Results:   No visits with results within 1 Day(s) from this visit     Latest known visit with results is:   Orders Only on 03/10/2022   Component Date Value    HIV Screen 4th Generatio* 03/10/2022 Non Reactive      Imaging Studies: I have personally reviewed pertinent films in PACS

## 2022-04-07 ENCOUNTER — CONSULT (OUTPATIENT)
Dept: CARDIOLOGY CLINIC | Facility: CLINIC | Age: 57
End: 2022-04-07
Payer: COMMERCIAL

## 2022-04-07 VITALS
DIASTOLIC BLOOD PRESSURE: 78 MMHG | TEMPERATURE: 96.9 F | SYSTOLIC BLOOD PRESSURE: 110 MMHG | HEIGHT: 69 IN | HEART RATE: 56 BPM | WEIGHT: 263 LBS | BODY MASS INDEX: 38.95 KG/M2 | OXYGEN SATURATION: 98 %

## 2022-04-07 DIAGNOSIS — R00.2 PALPITATION: ICD-10-CM

## 2022-04-07 DIAGNOSIS — R06.02 EXERTIONAL SHORTNESS OF BREATH: ICD-10-CM

## 2022-04-07 DIAGNOSIS — R94.31 ABNORMAL EKG: ICD-10-CM

## 2022-04-07 DIAGNOSIS — E66.9 OBESITY (BMI 30-39.9): ICD-10-CM

## 2022-04-07 DIAGNOSIS — F12.10 MARIJUANA ABUSE: ICD-10-CM

## 2022-04-07 DIAGNOSIS — R00.1 BRADYCARDIA: ICD-10-CM

## 2022-04-07 DIAGNOSIS — E78.5 DYSLIPIDEMIA: ICD-10-CM

## 2022-04-07 PROCEDURE — 93000 ELECTROCARDIOGRAM COMPLETE: CPT | Performed by: INTERNAL MEDICINE

## 2022-04-07 PROCEDURE — 1036F TOBACCO NON-USER: CPT | Performed by: INTERNAL MEDICINE

## 2022-04-07 PROCEDURE — 99205 OFFICE O/P NEW HI 60 MIN: CPT | Performed by: INTERNAL MEDICINE

## 2022-04-07 PROCEDURE — 3008F BODY MASS INDEX DOCD: CPT | Performed by: INTERNAL MEDICINE

## 2022-04-07 NOTE — PROGRESS NOTES
Consultation - Cardiology Office  [facility]  Genie Marino 59 y o  male MRN: [id number]  : 1965  Unit/Bed#:  Encounter: [id number]      Assessment:     1  Exertional shortness of breath    2  Dyslipidemia    3  Bradycardia    4  Palpitation    5  Abnormal EKG    6  Obesity (BMI 30-39 9)    7  Marijuana abuse        Discussion summary and Plan:    1  Exertional shortness of breath  Patient is having exertional shortness of breath which started in the last 6 months he has also gained about 20 lb possible causes could be obesity related  Advised him to lose weight but he had multiple risk factor including abnormal EKG, male sex and dyslipidemia he will be scheduled for nuclear stress test as his EKG is abnormal and check echo Doppler for LV function  2  Bradycardia with PACs  Patient has been noted to be bradycardic previously heart rate lower as 43 has been mentioned  EKG shows sinus rhythm with frequent PACs  Will check Holter monitor to rule out any significant tachy-renetta arrhythmias  3  Dyslipidemia with risk for cardiovascular event around 7 5 %  Patient need to be on statin therapy it she can be on low-dose Crestor like 5 mg every other day to better decrease his risk for heart attack and stroke  If he agrees we will consider starting him    4  Abnormal EKG  Noted to have first-degree AV block, PACs as well as Q-wave inferior lead and ST flattening scheduled for exercise nuclear stress test    5  Obesity with BMI around 39 4  Encouraged him to lose weight    6  History of marijuana use   Discussed with patient he will try to quit it he was using it to sleep will discussed with primary care team       All those issues discussed with patient he would like to try diet 1st about cholesterol  Further plan as also of these tests become available        Patient was advised and educated to call our office  immediately if  patient has any new symptoms of chest pain/shortness of breath, near-syncope, syncope, light headedness sustained palpitations  or any other cardiovascular symptoms before their scheduled follow-up appointment  Office #[phone]  Thank you for your consultation  If you have any question please call me at [phone]    Counseling :  A description of the counseling  Goals and Barriers  Patient's ability to self care: Yes  Medication side effect reviewed with patient in detail and all their questions answered to their satisfaction  Primary Care Physician Requesting Consult: Meme Chandler DO    Reason for Consult / Principal Problem:  Exertional shortness of breath and palpitations  HPI :     Dimitri Young is a 59y o  year old male who was referred by primary care doctor for exertional shortness of breath and palpitations  Patient who has medical history significant for bradycardia, obesity with BMI around 44 has noted lately he is having exertional shortness of breath and then racing of his heart  He had gained about 10-15 lb in the last year and since then he has noted when he climbs stairs or he walks he gets short of breath and occasionally get palpitations  He was noted to have first-degree AV block as well as episodes of bradycardia today also his heart rate is 56 beats per minute he has sinus arrhythmia  And first-degree AV block EKG also shows T-wave inversions and ST flattening in inferior leads along with Q-waves in inferior leads  He denies any personal history of MI in the past   There is no family history of premature coronary artery disease but his mother did have pacemaker  Other medical history significant for history of kidney stones  He never smoked but lately he started using marijuana mostly in the evening for some time to sleep  No nausea no vomiting no fever no chills no leg edema  Past surgical history  Multiple orthopedic surgery including shoulder surgery and biceps surgery      Review of Systems   Constitutional: Negative for activity change, chills, diaphoresis, fever and unexpected weight change  HENT: Negative for congestion  Eyes: Negative for discharge and redness  Respiratory: Positive for shortness of breath  Negative for cough, chest tightness and wheezing  Exertional   Cardiovascular: Positive for palpitations  Negative for chest pain and leg swelling  When exerts   Gastrointestinal: Negative for abdominal pain, diarrhea and nausea  Endocrine: Negative  Genitourinary: Negative for decreased urine volume and urgency  Musculoskeletal: Positive for arthralgias  Negative for back pain and gait problem  Skin: Negative for rash and wound  Allergic/Immunologic: Negative  Neurological: Negative for dizziness, seizures, syncope, weakness, light-headedness and headaches  Hematological: Negative  Psychiatric/Behavioral: Negative for agitation and confusion  The patient is not nervous/anxious  Historical Information   Past Medical History:   Diagnosis Date    Hyperlipidemia     Kidney stone      Past Surgical History:   Procedure Laterality Date    BICEPS TENDON REPAIR      INGUINAL HERNIA REPAIR      LASIK Bilateral     ROTATOR CUFF REPAIR       Social History     Substance and Sexual Activity   Alcohol Use Not Currently    Comment: rare     Social History     Substance and Sexual Activity   Drug Use Yes    Types: Marijuana     Social History     Tobacco Use   Smoking Status Never Smoker   Smokeless Tobacco Never Used     Family History:   Family History   Problem Relation Age of Onset    Hypertension Mother     Diabetes Mother     Hypertension Father     Colon polyps Father     Mental illness Neg Hx        Meds/Allergies     No Known Allergies  No current outpatient medications on file  Vitals: Blood pressure 110/78, pulse 56, temperature (!) 96 9 °F (36 1 °C), height 5' 8 5" (1 74 m), weight 119 kg (263 lb), SpO2 98 %      Body mass index is 39 41 kg/m²  Wt Readings from Last 3 Encounters:   04/07/22 119 kg (263 lb)   03/24/22 118 kg (260 lb)   03/01/22 118 kg (260 lb)     Vitals:    04/07/22 0949   Weight: 119 kg (263 lb)     BP Readings from Last 3 Encounters:   04/07/22 110/78   03/24/22 133/79   03/01/22 120/80         Physical Exam    Neurologic:  Alert & oriented x 3, no new focal deficits, Not in any acute distress,  Constitutional:  Adequate built, non-toxic appearance   Neck: Normal range of motion, no tenderness,  Neck supple   Respiratory:  Bilateral air entry, mostly clear to auscultation  Cardiovascular: S1-S2 irregular and bradycardic with 2/6 murmur  GI:  Soft, nondistended,  nontender, no hepatosplenomegaly appreciated  Musculoskeletal:  No edema, no tenderness, no deformities  Skin:  Well hydrated, no rash   Extremities:  No edema and distal pulses are present  Psychiatric:  Speech and behavior appropriate     Diagnostic Studies Review Cardio:  None recent    EKG:    Twelve lead EKG done on 04/07/2022 shows sinus bradycardia heart rate 56 beats per minute first-degree AV block  Q-wave noted in inferior leads with ST flattening cannot rule out inferior wall infarction    Imaging:  Chest X-Ray:   No Chest XR results available for this patient  CT-scan of the chest:     No CTA results available for this patient    Lab Review     BMP:  Lab Results   Component Value Date    SODIUM 139 03/10/2022    K 4 1 03/10/2022     03/10/2022    CO2 21 03/10/2022    BUN 16 03/10/2022    CREATININE 1 22 03/10/2022    GLUC 102 (H) 03/10/2022    EGFR 68 03/10/2022     Troponins:    LFT:  Lab Results   Component Value Date    AST 20 03/10/2022    ALT 25 03/10/2022    TP 6 8 03/10/2022    ALB 4 5 03/10/2022      Lipid Profile:   Lab Results   Component Value Date    CHOLESTEROL 227 (H) 03/10/2022    HDL 47 03/10/2022    LDLCALC 151 (H) 03/10/2022    TRIG 161 (H) 03/10/2022     Lab Results   Component Value Date    CHOLESTEROL 227 (H) 03/10/2022     The 10-year ASCVD risk score (Carlos Kinney et al , 2013) is: 7 3%    Values used to calculate the score:      Age: 59 years      Sex: Male      Is Non- : No      Diabetic: No      Tobacco smoker: No      Systolic Blood Pressure: 835 mmHg      Is BP treated: No      HDL Cholesterol: 47 mg/dL      Total Cholesterol: 227 mg/dL      Dr Tommy Castillo MD [facility] [place]      "This note has been constructed using a voice recognition system  Therefore there may be syntax, spelling, and/or grammatical errors   Please call if you have any questions  "

## 2022-05-03 ENCOUNTER — HOSPITAL ENCOUNTER (OUTPATIENT)
Dept: RADIOLOGY | Facility: HOSPITAL | Age: 57
Discharge: HOME | End: 2022-05-03
Attending: INTERNAL MEDICINE
Payer: COMMERCIAL

## 2022-05-03 ENCOUNTER — HOSPITAL ENCOUNTER (OUTPATIENT)
Dept: NON INVASIVE DIAGNOSTICS | Facility: HOSPITAL | Age: 57
Discharge: HOME | End: 2022-05-03
Attending: INTERNAL MEDICINE
Payer: COMMERCIAL

## 2022-05-03 ENCOUNTER — TELEPHONE (OUTPATIENT)
Dept: CARDIOLOGY CLINIC | Facility: CLINIC | Age: 57
End: 2022-05-03

## 2022-05-03 VITALS
HEIGHT: 69 IN | HEART RATE: 61 BPM | WEIGHT: 263 LBS | DIASTOLIC BLOOD PRESSURE: 92 MMHG | SYSTOLIC BLOOD PRESSURE: 160 MMHG | BODY MASS INDEX: 38.95 KG/M2

## 2022-05-03 DIAGNOSIS — E78.5 DYSLIPIDEMIA: ICD-10-CM

## 2022-05-03 DIAGNOSIS — F12.10 MARIJUANA ABUSE: ICD-10-CM

## 2022-05-03 DIAGNOSIS — R00.2 PALPITATION: ICD-10-CM

## 2022-05-03 DIAGNOSIS — R00.1 BRADYCARDIA: ICD-10-CM

## 2022-05-03 DIAGNOSIS — R06.02 EXERTIONAL SHORTNESS OF BREATH: ICD-10-CM

## 2022-05-03 DIAGNOSIS — E66.9 OBESITY (BMI 30-39.9): ICD-10-CM

## 2022-05-03 DIAGNOSIS — R94.31 ABNORMAL EKG: ICD-10-CM

## 2022-05-03 LAB
AORTIC ROOT: 3.5 CM
APICAL FOUR CHAMBER EJECTION FRACTION: 65 %
CHEST PAIN STATEMENT: NORMAL
E WAVE DECELERATION TIME: 218 MS
FRACTIONAL SHORTENING: 39 % (ref 28–44)
INTERVENTRICULAR SEPTUM IN DIASTOLE (PARASTERNAL SHORT AXIS VIEW): 1 CM
INTERVENTRICULAR SEPTUM: 1 CM (ref 0.58–1.08)
LAAS-AP2: 22.3 CM2
LAAS-AP4: 18.5 CM2
LEFT ATRIUM SIZE: 4.7 CM
LEFT INTERNAL DIMENSION IN SYSTOLE: 3.3 CM (ref 5.95–9.01)
LEFT VENTRICULAR INTERNAL DIMENSION IN DIASTOLE: 5.4 CM (ref 10.06–14.99)
LEFT VENTRICULAR POSTERIOR WALL IN END DIASTOLE: 1.2 CM (ref 0.56–1.07)
LEFT VENTRICULAR STROKE VOLUME: 100 ML
LVSV (TEICH): 100 ML
MAX DIASTOLIC BP: 80 MMHG
MAX HEART RATE: 118 BPM
MAX HR PERCENT: 73 %
MAX HR: 161 BPM
MAX PREDICTED HEART RATE: 161 BPM
MAX. SYSTOLIC BP: 150 MMHG
MV E'TISSUE VEL-SEP: 12 CM/S
MV PEAK A VEL: 0.65 M/S
MV PEAK E VEL: 98 CM/S
MV STENOSIS PRESSURE HALF TIME: 63 MS
MV VALVE AREA P 1/2 METHOD: 3.49 CM2
PROTOCOL NAME: NORMAL
RATE PRESSURE PRODUCT: NORMAL
REASON FOR TERMINATION: NORMAL
RIGHT ATRIUM AREA SYSTOLE A4C: 17.2 CM2
RIGHT VENTRICLE ID DIMENSION: 3.5 CM
SL CV LEFT ATRIUM LENGTH A2C: 5.7 CM
SL CV PED ECHO LEFT VENTRICLE DIASTOLIC VOLUME (MOD BIPLANE) 2D: 144 ML
SL CV PED ECHO LEFT VENTRICLE SYSTOLIC VOLUME (MOD BIPLANE) 2D: 44 ML
SL CV REST NUCLEAR ISOTOPE DOSE: 15.67 MCI
SL CV STRESS NUCLEAR ISOTOPE DOSE: 45 MCI
SL CV STRESS RECOVERY BP: NORMAL MMHG
SL CV STRESS RECOVERY HR: 37 BPM
SL CV STRESS RECOVERY O2 SAT: 99 %
STRESS ANGINA INDEX: 0
STRESS BASELINE BP: NORMAL MMHG
STRESS BASELINE HR: 41 BPM
STRESS O2 SAT REST: 99 %
STRESS PEAK HR: 118 BPM
STRESS PERCENT HR: 73 %
STRESS POST ESTIMATED WORKLOAD: 10.1 METS
STRESS POST EXERCISE DUR MIN: 8 MIN
STRESS POST O2 SAT PEAK: 99 %
STRESS POST PEAK BP: 124 MMHG
STRESS TARGET HR: 118 BPM
TARGET HR FORMULA: NORMAL
TIME IN EXERCISE PHASE: NORMAL
Z-SCORE OF INTERVENTRICULAR SEPTUM IN END DIASTOLE: 1.32
Z-SCORE OF LEFT VENTRICULAR DIMENSION IN END DIASTOLE: -8.22
Z-SCORE OF LEFT VENTRICULAR DIMENSION IN END SYSTOLE: -6.3
Z-SCORE OF LEFT VENTRICULAR POSTERIOR WALL IN END DIASTOLE: 2.98

## 2022-05-03 PROCEDURE — 93017 CV STRESS TEST TRACING ONLY: CPT

## 2022-05-03 PROCEDURE — 93018 CV STRESS TEST I&R ONLY: CPT | Performed by: INTERNAL MEDICINE

## 2022-05-03 PROCEDURE — 93306 TTE W/DOPPLER COMPLETE: CPT | Performed by: INTERNAL MEDICINE

## 2022-05-03 PROCEDURE — 93016 CV STRESS TEST SUPVJ ONLY: CPT | Performed by: INTERNAL MEDICINE

## 2022-05-03 PROCEDURE — 93225 XTRNL ECG REC<48 HRS REC: CPT

## 2022-05-03 PROCEDURE — C8929 TTE W OR WO FOL WCON,DOPPLER: HCPCS

## 2022-05-03 PROCEDURE — A9502 TC99M TETROFOSMIN: HCPCS

## 2022-05-03 PROCEDURE — G1004 CDSM NDSC: HCPCS

## 2022-05-03 PROCEDURE — 78452 HT MUSCLE IMAGE SPECT MULT: CPT

## 2022-05-03 PROCEDURE — 78452 HT MUSCLE IMAGE SPECT MULT: CPT | Performed by: INTERNAL MEDICINE

## 2022-05-03 PROCEDURE — 93226 XTRNL ECG REC<48 HR SCAN A/R: CPT

## 2022-05-03 RX ADMIN — PERFLUTREN 0.6 ML/MIN: 6.52 INJECTION, SUSPENSION INTRAVENOUS at 08:51

## 2022-05-03 NOTE — TELEPHONE ENCOUNTER
----- Message from Seferino Frank MD sent at 5/3/2022 10:42 AM EDT -----  Patient's echo shows normal LV systolic function  No significant, to mild valvular disease  Patient can keep an appointment  Please call patient about echo report

## 2022-05-04 NOTE — TELEPHONE ENCOUNTER
Pt returning call for echo results  Do you have the results for stress testing before I return his call?

## 2022-05-04 NOTE — TELEPHONE ENCOUNTER
Discussed results with pt  He denies any symptoms but will call the office for an appt if symptoms occur

## 2022-05-06 ENCOUNTER — HOSPITAL ENCOUNTER (OUTPATIENT)
Dept: GASTROENTEROLOGY | Facility: AMBULARY SURGERY CENTER | Age: 57
Setting detail: OUTPATIENT SURGERY
Discharge: HOME | End: 2022-05-06
Attending: INTERNAL MEDICINE | Admitting: INTERNAL MEDICINE
Payer: COMMERCIAL

## 2022-05-06 ENCOUNTER — ANESTHESIA (OUTPATIENT)
Dept: GASTROENTEROLOGY | Facility: AMBULARY SURGERY CENTER | Age: 57
End: 2022-05-06

## 2022-05-06 ENCOUNTER — ANESTHESIA EVENT (OUTPATIENT)
Dept: GASTROENTEROLOGY | Facility: AMBULARY SURGERY CENTER | Age: 57
End: 2022-05-06

## 2022-05-06 VITALS
TEMPERATURE: 98 F | OXYGEN SATURATION: 98 % | DIASTOLIC BLOOD PRESSURE: 55 MMHG | SYSTOLIC BLOOD PRESSURE: 112 MMHG | HEART RATE: 45 BPM | RESPIRATION RATE: 18 BRPM

## 2022-05-06 DIAGNOSIS — Z12.11 SCREEN FOR COLON CANCER: ICD-10-CM

## 2022-05-06 PROCEDURE — 93227 XTRNL ECG REC<48 HR R&I: CPT | Performed by: INTERNAL MEDICINE

## 2022-05-06 PROCEDURE — 45385 COLONOSCOPY W/LESION REMOVAL: CPT | Performed by: INTERNAL MEDICINE

## 2022-05-06 PROCEDURE — 45380 COLONOSCOPY AND BIOPSY: CPT | Performed by: INTERNAL MEDICINE

## 2022-05-06 PROCEDURE — 88305 TISSUE EXAM BY PATHOLOGIST: CPT | Performed by: PATHOLOGY

## 2022-05-06 RX ORDER — PROPOFOL 10 MG/ML
INJECTION, EMULSION INTRAVENOUS AS NEEDED
Status: DISCONTINUED | OUTPATIENT
Start: 2022-05-06 | End: 2022-05-06

## 2022-05-06 RX ORDER — SODIUM CHLORIDE, SODIUM LACTATE, POTASSIUM CHLORIDE, CALCIUM CHLORIDE 600; 310; 30; 20 MG/100ML; MG/100ML; MG/100ML; MG/100ML
INJECTION, SOLUTION INTRAVENOUS CONTINUOUS PRN
Status: DISCONTINUED | OUTPATIENT
Start: 2022-05-06 | End: 2022-05-06

## 2022-05-06 RX ORDER — PROPOFOL 10 MG/ML
INJECTION, EMULSION INTRAVENOUS CONTINUOUS PRN
Status: DISCONTINUED | OUTPATIENT
Start: 2022-05-06 | End: 2022-05-06

## 2022-05-06 RX ORDER — LIDOCAINE HYDROCHLORIDE 10 MG/ML
INJECTION, SOLUTION EPIDURAL; INFILTRATION; INTRACAUDAL; PERINEURAL AS NEEDED
Status: DISCONTINUED | OUTPATIENT
Start: 2022-05-06 | End: 2022-05-06

## 2022-05-06 RX ADMIN — SODIUM CHLORIDE, SODIUM LACTATE, POTASSIUM CHLORIDE, AND CALCIUM CHLORIDE: .6; .31; .03; .02 INJECTION, SOLUTION INTRAVENOUS at 09:29

## 2022-05-06 RX ADMIN — PROPOFOL 110 MCG/KG/MIN: 10 INJECTION, EMULSION INTRAVENOUS at 09:35

## 2022-05-06 RX ADMIN — PROPOFOL 150 MG: 10 INJECTION, EMULSION INTRAVENOUS at 09:35

## 2022-05-06 RX ADMIN — LIDOCAINE HYDROCHLORIDE 50 MG: 10 INJECTION, SOLUTION EPIDURAL; INFILTRATION; INTRACAUDAL; PERINEURAL at 09:35

## 2022-05-06 NOTE — ANESTHESIA PREPROCEDURE EVALUATION
Procedure:  COLONOSCOPY    Relevant Problems   CARDIO   (+) 1st degree AV block      Other   (+) Screen for colon cancer             Anesthesia Plan  ASA Score- 2     Anesthesia Type- IV sedation with anesthesia with ASA Monitors  Additional Monitors:   Airway Plan:           Plan Factors-    Chart reviewed  Induction- intravenous  Postoperative Plan-     Informed Consent- Anesthetic plan and risks discussed with patient  I personally reviewed this patient with the CRNA  Discussed and agreed on the Anesthesia Plan with the CRNA  Mitra Maxwell

## 2022-05-06 NOTE — ANESTHESIA POSTPROCEDURE EVALUATION
Post-Op Assessment Note    CV Status:  Stable  Pain Score: 0    Pain management: adequate     Mental Status:  Sleepy   Hydration Status:  Stable   PONV Controlled:  None   Airway Patency:  Patent   Two or more mitigation strategies used for obstructive sleep apnea   Post Op Vitals Reviewed: Yes      Staff: CRNA         No complications documented      BP   121/75   Temp      Pulse 65   Resp 16   SpO2 99

## 2022-05-06 NOTE — H&P
History and Physical -  [facility]  Johanny Prince 59 y o  male MRN: [id number]    HPI: Johanny Prince is a 59y o  year old male who presents for colon cancer screening  Review of Systems    Historical Information   Past Medical History:   Diagnosis Date    Hyperlipidemia     Irregular heart beat     bradycardia post stress test, holter monitor on until 05/05/22 Dr Sulema Pugh Kidney stone      Past Surgical History:   Procedure Laterality Date    BICEPS TENDON REPAIR Bilateral     INGUINAL HERNIA REPAIR      LASIK Bilateral     ROTATOR CUFF REPAIR Right     VASECTOMY       Social History   Social History     Substance and Sexual Activity   Alcohol Use Not Currently    Comment: rare     Social History     Substance and Sexual Activity   Drug Use Not Currently    Types: Marijuana     Social History     Tobacco Use   Smoking Status Never Smoker   Smokeless Tobacco Never Used     Family History   Problem Relation Age of Onset    Hypertension Mother     Diabetes Mother     Hypertension Father     Colon polyps Father     Mental illness Neg Hx        Meds/Allergies     (Not in a hospital admission)      No Known Allergies    Objective     /92   Pulse 59   Temp 98 °F (36 7 °C) (Temporal)   Resp 18   SpO2 98%       PHYSICAL EXAM    Gen: NAD  CV: RRR  CHEST: Clear  ABD: soft, NT/ND  EXT: no edema  Neuro: AAO      ASSESSMENT/PLAN:  This is a 59y o  year old male here for colon cancer screening  Patient was explained about  the risks and benefits of the procedure  Risks including but not limited to bleeding, infection, perforation were explained in detail  Also explained about less than 100% sensitivity with the exam and other alternatives  PLAN:   Procedure:  Colonoscopy

## 2022-06-29 ENCOUNTER — OFFICE VISIT (OUTPATIENT)
Dept: CARDIOLOGY CLINIC | Facility: CLINIC | Age: 57
End: 2022-06-29
Payer: COMMERCIAL

## 2022-06-29 VITALS
HEIGHT: 69 IN | TEMPERATURE: 97 F | SYSTOLIC BLOOD PRESSURE: 128 MMHG | OXYGEN SATURATION: 98 % | BODY MASS INDEX: 39.4 KG/M2 | HEART RATE: 54 BPM | WEIGHT: 266 LBS | DIASTOLIC BLOOD PRESSURE: 76 MMHG

## 2022-06-29 DIAGNOSIS — R94.39 ABNORMAL STRESS TEST: ICD-10-CM

## 2022-06-29 DIAGNOSIS — R00.1 BRADYCARDIA: ICD-10-CM

## 2022-06-29 DIAGNOSIS — R06.83 SNORING: ICD-10-CM

## 2022-06-29 DIAGNOSIS — E66.9 OBESITY (BMI 30-39.9): ICD-10-CM

## 2022-06-29 DIAGNOSIS — R94.31 ABNORMAL EKG: ICD-10-CM

## 2022-06-29 DIAGNOSIS — E78.5 DYSLIPIDEMIA: ICD-10-CM

## 2022-06-29 DIAGNOSIS — R06.02 EXERTIONAL SHORTNESS OF BREATH: ICD-10-CM

## 2022-06-29 DIAGNOSIS — F12.10 MARIJUANA ABUSE: ICD-10-CM

## 2022-06-29 DIAGNOSIS — R00.2 PALPITATION: ICD-10-CM

## 2022-06-29 PROCEDURE — 3008F BODY MASS INDEX DOCD: CPT | Performed by: INTERNAL MEDICINE

## 2022-06-29 PROCEDURE — 1036F TOBACCO NON-USER: CPT | Performed by: INTERNAL MEDICINE

## 2022-06-29 PROCEDURE — 99215 OFFICE O/P EST HI 40 MIN: CPT | Performed by: INTERNAL MEDICINE

## 2022-06-29 RX ORDER — ASPIRIN 81 MG/1
81 TABLET ORAL DAILY
Qty: 100 TABLET | Refills: 1 | Status: SHIPPED | OUTPATIENT
Start: 2022-06-29

## 2022-06-29 RX ORDER — ASPIRIN 81 MG/1
324 TABLET, CHEWABLE ORAL ONCE
Status: CANCELLED | OUTPATIENT
Start: 2022-06-29 | End: 2022-06-29

## 2022-06-29 RX ORDER — ROSUVASTATIN CALCIUM 5 MG/1
5 TABLET, COATED ORAL DAILY
Qty: 90 TABLET | Refills: 1 | Status: ON HOLD | OUTPATIENT
Start: 2022-06-29 | End: 2022-07-21 | Stop reason: SDUPTHER

## 2022-06-29 RX ORDER — SODIUM CHLORIDE 9 MG/ML
100 INJECTION, SOLUTION INTRAVENOUS CONTINUOUS
Status: CANCELLED | OUTPATIENT
Start: 2022-06-29

## 2022-06-29 NOTE — H&P (VIEW-ONLY)
Progress note - Cardiology Office   [facility]  Griselda Brace 59 y o  male MRN: [id number]  : 1965  Unit/Bed#:  Encounter: [id number]      Assessment:     1  Exertional shortness of breath    2  Dyslipidemia    3  Bradycardia    4  Palpitation    5  Obesity (BMI 30-39 9)    6  Abnormal EKG    7  Marijuana abuse    8  Abnormal stress test    9  Snoring        Discussion summary and Plan:    1  Exertional shortness of breath  Patient has exertional shortness of breath  He also had abnormal stress test and did dyslipidemia along with multiple risk factors  Due to abnormal stress test and persistent symptoms he will be scheduled for cardiac catheterization  All risks benefit discussed with him wishes to proceed  His echo nuclear and Holter reviewed with him  2  Bradycardia with PACs  Patient probably have underlying sick sinus syndrome and may be some component of sleep apnea  He has frequent PACs there were 7 6% of the total beats  His average heart rate is only 49  Stress test also shows he has a chronotropic incompetency he could only get his heart rate to 73% of maximum predicted heart rate and he became bradycardic  It is highly likely that he end up with pacemaker at some point  He was educated if he has any symptoms of dizziness lightheadedness he need to call us  Both his parents have history of pacemakers  3  Dyslipidemia with abnormal stress in his risk for cardiovascular event now around 9-10%  He will be started on Crestor 5 mg  And will check labs in 3-4 months  4  Abnormal stress test with abnormal EKG  He had abnormal stress test and abnormal EKG along with perfusion abnormality  Scheduled for cardiac catheterization  5  Obesity with BMI around 39 4  Encouraged him to lose weight    6  History of marijuana use     Discussed with patient he will try to quit it he was using it to sleep will discussed with primary care team       7  Possible sleep apnea  Patient has noted that he snores a lot  He is also fatigue and tired  Which could be due to bradycardia but need to rule out sleep apnea due to body habitus  Plan  Scheduled for cardiac catheterization possible PCI it at [facility]  Aspirin  Started on Crestor  Need to do blood test  Educated about sinus symptoms of bradycardia  May end up with pacemaker  He does not need to hold any medication for the procedure  Patient was advised and educated to call our office  immediately if  patient has any new symptoms of chest pain/shortness of breath, near-syncope, syncope, light headedness sustained palpitations  or any other cardiovascular symptoms before their scheduled follow-up appointment  Office #[phone]  Thank you for your consultation  If you have any question please call me at [phone]    Counseling :  A description of the counseling  Goals and Barriers  Patient's ability to self care: Yes  Medication side effect reviewed with patient in detail and all their questions answered to their satisfaction  Primary Care Physician : Raymundo Moralez DO          HPI :     Estela Flowers is a 59y o  year old male who was referred by primary care doctor for exertional shortness of breath and palpitations  Patient who has medical history significant for bradycardia, obesity with BMI around 44 has noted lately he is having exertional shortness of breath and then racing of his heart  He had gained about 10-15 lb in the last year and since then he has noted when he climbs stairs or he walks he gets short of breath and occasionally get palpitations  He was noted to have first-degree AV block as well as episodes of bradycardia today also his heart rate is 56 beats per minute he has sinus arrhythmia  And first-degree AV block EKG also shows T-wave inversions and ST flattening in inferior leads along with Q-waves in inferior leads    He denies any personal history of MI in the past  There is no family history of premature coronary artery disease but his mother did have pacemaker  Other medical history significant for history of kidney stones  He never smoked but lately he started using marijuana mostly in the evening for some time to sleep  No nausea no vomiting no fever no chills no leg edema  Past surgical history  Multiple orthopedic surgery including shoulder surgery and biceps surgery  06/29/2022  Above reviewed  Patient was initially seen by us for exertion shortness of breath and palpitations  He also had history of bradycardia  Holter monitor shows his average heart rate was 49 beats per minute and he has frequent PACs defer 7 6% of his total beats  He underwent nuclear stress test   He tried walking on the treadmill he did walk for 8 minutes and he could only achieve 73% of his maximum predicted heart rate and then he became bradycardic  He did with symptoms of dizziness and lightheadedness and he was in junctional bradycardia and test was changed to pharmacological test   Which shows he had a EKG changes as well as ischemia  Holter monitor shows patient has bradycardia for 35 hours per  He admits that he snores at night  He feels he has more easy fatigue and tired but denies any symptoms of dizziness or lightheadedness  He has not passed out  There is a family history of pacemaker his mother also had a pacemaker and his father also had a pacemaker at some point of his life  Review of Systems   Constitutional: Positive for fatigue  Negative for activity change, chills, diaphoresis, fever and unexpected weight change  HENT: Negative for congestion  Eyes: Negative for discharge and redness  Respiratory: Positive for shortness of breath  Negative for cough, chest tightness and wheezing  Exertional   Cardiovascular: Positive for palpitations  Negative for chest pain and leg swelling     Gastrointestinal: Negative for abdominal pain, diarrhea and nausea  Endocrine: Negative  Genitourinary: Negative for decreased urine volume and urgency  Musculoskeletal: Negative  Negative for arthralgias, back pain and gait problem  Skin: Negative for rash and wound  Allergic/Immunologic: Negative  Neurological: Negative for dizziness, seizures, syncope, weakness, light-headedness and headaches  Hematological: Negative  Psychiatric/Behavioral: Positive for sleep disturbance  Negative for agitation and confusion  The patient is nervous/anxious  Historical Information   Past Medical History:   Diagnosis Date    Hyperlipidemia     Irregular heart beat     bradycardia post stress test, holter monitor on until 05/05/22 Dr Caitie Hurtado Kidney stone      Past Surgical History:   Procedure Laterality Date    BICEPS TENDON REPAIR Bilateral     INGUINAL HERNIA REPAIR      LASIK Bilateral     ROTATOR CUFF REPAIR Right     VASECTOMY       Social History     Substance and Sexual Activity   Alcohol Use Not Currently    Comment: rare     Social History     Substance and Sexual Activity   Drug Use Not Currently    Types: Marijuana     Social History     Tobacco Use   Smoking Status Never Smoker   Smokeless Tobacco Never Used     Family History:   Family History   Problem Relation Age of Onset    Hypertension Mother     Diabetes Mother     Hypertension Father     Colon polyps Father     Mental illness Neg Hx        Meds/Allergies     No Known Allergies    Current Outpatient Medications:     aspirin (ECOTRIN LOW STRENGTH) 81 mg EC tablet, Take 1 tablet (81 mg total) by mouth daily, Disp: 100 tablet, Rfl: 1    rosuvastatin (CRESTOR) 5 mg tablet, Take 1 tablet (5 mg total) by mouth daily, Disp: 90 tablet, Rfl: 1    Vitals: Blood pressure 128/76, pulse (!) 54, temperature (!) 97 °F (36 1 °C), height 5' 8 5" (1 74 m), weight 121 kg (266 lb), SpO2 98 %  ?  Body mass index is 39 86 kg/m²    Wt Readings from Last 3 Encounters:   06/29/22 121 kg (266 lb) 05/03/22 119 kg (263 lb)   04/07/22 119 kg (263 lb)     Vitals:    06/29/22 1409   Weight: 121 kg (266 lb)     BP Readings from Last 3 Encounters:   06/29/22 128/76   05/06/22 112/55   05/03/22 160/92         Physical Exam  Constitutional:       General: He is not in acute distress  Appearance: He is well-developed  He is not diaphoretic  Neck:      Thyroid: No thyromegaly  Vascular: No JVD  Trachea: No tracheal deviation  Cardiovascular:      Rate and Rhythm: Normal rate and regular rhythm  Heart sounds: S1 normal and S2 normal  Heart sounds not distant  Murmur heard  Systolic (ejection) murmur is present with a grade of 2/6  No friction rub  No gallop  No S3 or S4 sounds  Pulmonary:      Effort: Pulmonary effort is normal  No respiratory distress  Breath sounds: Normal breath sounds  No wheezing or rales  Chest:      Chest wall: No tenderness  Abdominal:      General: Bowel sounds are normal  There is no distension  Palpations: Abdomen is soft  Tenderness: There is no abdominal tenderness  Musculoskeletal:         General: No deformity  Cervical back: Neck supple  Skin:     General: Skin is warm and dry  Coloration: Skin is not pale  Findings: No rash  Neurological:      Mental Status: He is alert and oriented to person, place, and time  Psychiatric:         Behavior: Behavior normal          Judgment: Judgment normal             Diagnostic Studies Review Cardio:    Echo Doppler 05/03/2022 shows normal LV systolic function EF 69-22%, borderline LV thickness  No significant valvular disease  Nuclear stress test   Nuclear stress test shows small reversible ischemia in the inferior wall with sum score of 5  Patient did have some junctional rhythm during recovery along with bradycardia  EKG shows 1 mm ST depression which was upsloping and horizontal   EF was 64%        Holter monitor shows patient has average heart rate is 49 beats per minute  Maximum heart rate was 114 beats per minute  Patient has frequent PACs there were 7 6% of the total beats  Patient was in bradycardia for 35 hours  He has a 2 9 seconds pause during sleep hours  EKG:    Twelve lead EKG done on 04/07/2022 shows sinus bradycardia heart rate 56 beats per minute first-degree AV block  Q-wave noted in inferior leads with ST flattening cannot rule out inferior wall infarction    Imaging:  Chest X-Ray:   No Chest XR results available for this patient  CT-scan of the chest:     No CTA results available for this patient  Lab Review     BMP:  Lab Results   Component Value Date    SODIUM 139 03/10/2022    K 4 1 03/10/2022     03/10/2022    CO2 21 03/10/2022    BUN 16 03/10/2022    CREATININE 1 22 03/10/2022    GLUC 102 (H) 03/10/2022    EGFR 68 03/10/2022     Troponins:    LFT:  Lab Results   Component Value Date    AST 20 03/10/2022    ALT 25 03/10/2022    TP 6 8 03/10/2022    ALB 4 5 03/10/2022      Lipid Profile:   Lab Results   Component Value Date    CHOLESTEROL 227 (H) 03/10/2022    HDL 47 03/10/2022    LDLCALC 151 (H) 03/10/2022    TRIG 161 (H) 03/10/2022     Lab Results   Component Value Date    CHOLESTEROL 227 (H) 03/10/2022     The 10-year ASCVD risk score (Page Plascencia et al , 2013) is: 9 4%    Values used to calculate the score:      Age: 59 years      Sex: Male      Is Non- : No      Diabetic: No      Tobacco smoker: No      Systolic Blood Pressure: 264 mmHg      Is BP treated: No      HDL Cholesterol: 47 mg/dL      Total Cholesterol: 227 mg/dL      Dr Rachna Gruber MD [facility] [place]      "This note has been constructed using a voice recognition system  Therefore there may be syntax, spelling, and/or grammatical errors   Please call if you have any questions  "

## 2022-06-29 NOTE — PROGRESS NOTES
Progress note - Cardiology Office   [facility]  Kari Almeida 59 y o  male MRN: [id number]  : 1965  Unit/Bed#:  Encounter: [id number]      Assessment:     1  Exertional shortness of breath    2  Dyslipidemia    3  Bradycardia    4  Palpitation    5  Obesity (BMI 30-39 9)    6  Abnormal EKG    7  Marijuana abuse    8  Abnormal stress test    9  Snoring        Discussion summary and Plan:    1  Exertional shortness of breath  Patient has exertional shortness of breath  He also had abnormal stress test and did dyslipidemia along with multiple risk factors  Due to abnormal stress test and persistent symptoms he will be scheduled for cardiac catheterization  All risks benefit discussed with him wishes to proceed  His echo nuclear and Holter reviewed with him  2  Bradycardia with PACs  Patient probably have underlying sick sinus syndrome and may be some component of sleep apnea  He has frequent PACs there were 7 6% of the total beats  His average heart rate is only 49  Stress test also shows he has a chronotropic incompetency he could only get his heart rate to 73% of maximum predicted heart rate and he became bradycardic  It is highly likely that he end up with pacemaker at some point  He was educated if he has any symptoms of dizziness lightheadedness he need to call us  Both his parents have history of pacemakers  3  Dyslipidemia with abnormal stress in his risk for cardiovascular event now around 9-10%  He will be started on Crestor 5 mg  And will check labs in 3-4 months  4  Abnormal stress test with abnormal EKG  He had abnormal stress test and abnormal EKG along with perfusion abnormality  Scheduled for cardiac catheterization  5  Obesity with BMI around 39 4  Encouraged him to lose weight    6  History of marijuana use     Discussed with patient he will try to quit it he was using it to sleep will discussed with primary care team       7  Possible sleep apnea  Patient has noted that he snores a lot  He is also fatigue and tired  Which could be due to bradycardia but need to rule out sleep apnea due to body habitus  Plan  Scheduled for cardiac catheterization possible PCI it at [facility]  Aspirin  Started on Crestor  Need to do blood test  Educated about sinus symptoms of bradycardia  May end up with pacemaker  He does not need to hold any medication for the procedure  Patient was advised and educated to call our office  immediately if  patient has any new symptoms of chest pain/shortness of breath, near-syncope, syncope, light headedness sustained palpitations  or any other cardiovascular symptoms before their scheduled follow-up appointment  Office #[phone]  Thank you for your consultation  If you have any question please call me at [phone]    Counseling :  A description of the counseling  Goals and Barriers  Patient's ability to self care: Yes  Medication side effect reviewed with patient in detail and all their questions answered to their satisfaction  Primary Care Physician : Britney Vogel, DO          HPI :     Sasha Chaparro is a 59y o  year old male who was referred by primary care doctor for exertional shortness of breath and palpitations  Patient who has medical history significant for bradycardia, obesity with BMI around 44 has noted lately he is having exertional shortness of breath and then racing of his heart  He had gained about 10-15 lb in the last year and since then he has noted when he climbs stairs or he walks he gets short of breath and occasionally get palpitations  He was noted to have first-degree AV block as well as episodes of bradycardia today also his heart rate is 56 beats per minute he has sinus arrhythmia  And first-degree AV block EKG also shows T-wave inversions and ST flattening in inferior leads along with Q-waves in inferior leads    He denies any personal history of MI in the past  There is no family history of premature coronary artery disease but his mother did have pacemaker  Other medical history significant for history of kidney stones  He never smoked but lately he started using marijuana mostly in the evening for some time to sleep  No nausea no vomiting no fever no chills no leg edema  Past surgical history  Multiple orthopedic surgery including shoulder surgery and biceps surgery  06/29/2022  Above reviewed  Patient was initially seen by us for exertion shortness of breath and palpitations  He also had history of bradycardia  Holter monitor shows his average heart rate was 49 beats per minute and he has frequent PACs defer 7 6% of his total beats  He underwent nuclear stress test   He tried walking on the treadmill he did walk for 8 minutes and he could only achieve 73% of his maximum predicted heart rate and then he became bradycardic  He did with symptoms of dizziness and lightheadedness and he was in junctional bradycardia and test was changed to pharmacological test   Which shows he had a EKG changes as well as ischemia  Holter monitor shows patient has bradycardia for 35 hours per  He admits that he snores at night  He feels he has more easy fatigue and tired but denies any symptoms of dizziness or lightheadedness  He has not passed out  There is a family history of pacemaker his mother also had a pacemaker and his father also had a pacemaker at some point of his life  Review of Systems   Constitutional: Positive for fatigue  Negative for activity change, chills, diaphoresis, fever and unexpected weight change  HENT: Negative for congestion  Eyes: Negative for discharge and redness  Respiratory: Positive for shortness of breath  Negative for cough, chest tightness and wheezing  Exertional   Cardiovascular: Positive for palpitations  Negative for chest pain and leg swelling     Gastrointestinal: Negative for abdominal pain, diarrhea and nausea  Endocrine: Negative  Genitourinary: Negative for decreased urine volume and urgency  Musculoskeletal: Negative  Negative for arthralgias, back pain and gait problem  Skin: Negative for rash and wound  Allergic/Immunologic: Negative  Neurological: Negative for dizziness, seizures, syncope, weakness, light-headedness and headaches  Hematological: Negative  Psychiatric/Behavioral: Positive for sleep disturbance  Negative for agitation and confusion  The patient is nervous/anxious  Historical Information   Past Medical History:   Diagnosis Date    Hyperlipidemia     Irregular heart beat     bradycardia post stress test, holter monitor on until 05/05/22 Dr Nona Serrano Kidney stone      Past Surgical History:   Procedure Laterality Date    BICEPS TENDON REPAIR Bilateral     INGUINAL HERNIA REPAIR      LASIK Bilateral     ROTATOR CUFF REPAIR Right     VASECTOMY       Social History     Substance and Sexual Activity   Alcohol Use Not Currently    Comment: rare     Social History     Substance and Sexual Activity   Drug Use Not Currently    Types: Marijuana     Social History     Tobacco Use   Smoking Status Never Smoker   Smokeless Tobacco Never Used     Family History:   Family History   Problem Relation Age of Onset    Hypertension Mother     Diabetes Mother     Hypertension Father     Colon polyps Father     Mental illness Neg Hx        Meds/Allergies     No Known Allergies    Current Outpatient Medications:     aspirin (ECOTRIN LOW STRENGTH) 81 mg EC tablet, Take 1 tablet (81 mg total) by mouth daily, Disp: 100 tablet, Rfl: 1    rosuvastatin (CRESTOR) 5 mg tablet, Take 1 tablet (5 mg total) by mouth daily, Disp: 90 tablet, Rfl: 1    Vitals: Blood pressure 128/76, pulse (!) 54, temperature (!) 97 °F (36 1 °C), height 5' 8 5" (1 74 m), weight 121 kg (266 lb), SpO2 98 %  ?  Body mass index is 39 86 kg/m²    Wt Readings from Last 3 Encounters:   06/29/22 121 kg (266 lb) 05/03/22 119 kg (263 lb)   04/07/22 119 kg (263 lb)     Vitals:    06/29/22 1409   Weight: 121 kg (266 lb)     BP Readings from Last 3 Encounters:   06/29/22 128/76   05/06/22 112/55   05/03/22 160/92         Physical Exam  Constitutional:       General: He is not in acute distress  Appearance: He is well-developed  He is not diaphoretic  Neck:      Thyroid: No thyromegaly  Vascular: No JVD  Trachea: No tracheal deviation  Cardiovascular:      Rate and Rhythm: Normal rate and regular rhythm  Heart sounds: S1 normal and S2 normal  Heart sounds not distant  Murmur heard  Systolic (ejection) murmur is present with a grade of 2/6  No friction rub  No gallop  No S3 or S4 sounds  Pulmonary:      Effort: Pulmonary effort is normal  No respiratory distress  Breath sounds: Normal breath sounds  No wheezing or rales  Chest:      Chest wall: No tenderness  Abdominal:      General: Bowel sounds are normal  There is no distension  Palpations: Abdomen is soft  Tenderness: There is no abdominal tenderness  Musculoskeletal:         General: No deformity  Cervical back: Neck supple  Skin:     General: Skin is warm and dry  Coloration: Skin is not pale  Findings: No rash  Neurological:      Mental Status: He is alert and oriented to person, place, and time  Psychiatric:         Behavior: Behavior normal          Judgment: Judgment normal             Diagnostic Studies Review Cardio:    Echo Doppler 05/03/2022 shows normal LV systolic function EF 21-52%, borderline LV thickness  No significant valvular disease  Nuclear stress test   Nuclear stress test shows small reversible ischemia in the inferior wall with sum score of 5  Patient did have some junctional rhythm during recovery along with bradycardia  EKG shows 1 mm ST depression which was upsloping and horizontal   EF was 64%        Holter monitor shows patient has average heart rate is 49 beats per minute  Maximum heart rate was 114 beats per minute  Patient has frequent PACs there were 7 6% of the total beats  Patient was in bradycardia for 35 hours  He has a 2 9 seconds pause during sleep hours  EKG:    Twelve lead EKG done on 04/07/2022 shows sinus bradycardia heart rate 56 beats per minute first-degree AV block  Q-wave noted in inferior leads with ST flattening cannot rule out inferior wall infarction    Imaging:  Chest X-Ray:   No Chest XR results available for this patient  CT-scan of the chest:     No CTA results available for this patient  Lab Review     BMP:  Lab Results   Component Value Date    SODIUM 139 03/10/2022    K 4 1 03/10/2022     03/10/2022    CO2 21 03/10/2022    BUN 16 03/10/2022    CREATININE 1 22 03/10/2022    GLUC 102 (H) 03/10/2022    EGFR 68 03/10/2022     Troponins:    LFT:  Lab Results   Component Value Date    AST 20 03/10/2022    ALT 25 03/10/2022    TP 6 8 03/10/2022    ALB 4 5 03/10/2022      Lipid Profile:   Lab Results   Component Value Date    CHOLESTEROL 227 (H) 03/10/2022    HDL 47 03/10/2022    LDLCALC 151 (H) 03/10/2022    TRIG 161 (H) 03/10/2022     Lab Results   Component Value Date    CHOLESTEROL 227 (H) 03/10/2022     The 10-year ASCVD risk score (Shaan Talbot et al , 2013) is: 9 4%    Values used to calculate the score:      Age: 59 years      Sex: Male      Is Non- : No      Diabetic: No      Tobacco smoker: No      Systolic Blood Pressure: 422 mmHg      Is BP treated: No      HDL Cholesterol: 47 mg/dL      Total Cholesterol: 227 mg/dL      Dr Bhavesh Vickers MD [facility] [place]      "This note has been constructed using a voice recognition system  Therefore there may be syntax, spelling, and/or grammatical errors   Please call if you have any questions  "

## 2022-07-05 ENCOUNTER — TELEPHONE (OUTPATIENT)
Dept: SLEEP CENTER | Facility: CLINIC | Age: 57
End: 2022-07-05

## 2022-07-05 NOTE — TELEPHONE ENCOUNTER
----- Message from Pritesh Norris DO sent at 7/1/2022 11:37 PM EDT -----  approved  ----- Message -----  From: Adonis Dalal  Sent: 6/30/2022   8:02 AM EDT  To: Sleep Medicine [place] Provider    This Home sleep study needs approval      If approved please sign and return to clerical pool  If denied please include reasons why  Also provide alternative testing if warranted  Please sign and return to clerical pool

## 2022-07-19 LAB
BUN SERPL-MCNC: 13 MG/DL (ref 6–24)
BUN/CREAT SERPL: 13 (ref 9–20)
CALCIUM SERPL-MCNC: 9.4 MG/DL (ref 8.7–10.2)
CHLORIDE SERPL-SCNC: 101 MMOL/L (ref 96–106)
CO2 SERPL-SCNC: 22 MMOL/L (ref 20–29)
CREAT SERPL-MCNC: 1.01 MG/DL (ref 0.76–1.27)
EGFR: 86 ML/MIN/1.73
ERYTHROCYTE [DISTWIDTH] IN BLOOD BY AUTOMATED COUNT: 13.1 % (ref 11.6–15.4)
GLUCOSE SERPL-MCNC: 99 MG/DL (ref 65–99)
HCT VFR BLD AUTO: 46.4 % (ref 37.5–51)
HGB BLD-MCNC: 15.8 G/DL (ref 13–17.7)
INR PPP: 1 (ref 0.9–1.2)
MCH RBC QN AUTO: 29.6 PG (ref 26.6–33)
MCHC RBC AUTO-ENTMCNC: 34.1 G/DL (ref 31.5–35.7)
MCV RBC AUTO: 87 FL (ref 79–97)
PLATELET # BLD AUTO: 275 X10E3/UL (ref 150–450)
POTASSIUM SERPL-SCNC: 4.4 MMOL/L (ref 3.5–5.2)
PROTHROMBIN TIME: 10.3 SEC (ref 9.1–12)
RBC # BLD AUTO: 5.34 X10E6/UL (ref 4.14–5.8)
SODIUM SERPL-SCNC: 139 MMOL/L (ref 134–144)
WBC # BLD AUTO: 8.3 X10E3/UL (ref 3.4–10.8)

## 2022-07-20 ENCOUNTER — HOSPITAL ENCOUNTER (OUTPATIENT)
Facility: HOSPITAL | Age: 57
Setting detail: OUTPATIENT SURGERY
Discharge: HOME | End: 2022-07-21
Attending: INTERNAL MEDICINE | Admitting: INTERNAL MEDICINE
Payer: COMMERCIAL

## 2022-07-20 DIAGNOSIS — I25.10 CAD (CORONARY ARTERY DISEASE): Primary | ICD-10-CM

## 2022-07-20 DIAGNOSIS — E78.5 DYSLIPIDEMIA: ICD-10-CM

## 2022-07-20 DIAGNOSIS — R06.02 EXERTIONAL SHORTNESS OF BREATH: ICD-10-CM

## 2022-07-20 DIAGNOSIS — Z95.5 S/P CORONARY ARTERY STENT PLACEMENT: ICD-10-CM

## 2022-07-20 DIAGNOSIS — R94.39 ABNORMAL STRESS TEST: ICD-10-CM

## 2022-07-20 LAB
ATRIAL RATE: 38 BPM
KCT BLD-ACNC: 225 SEC (ref 89–137)
P AXIS: 42 DEGREES
PR INTERVAL: 0 MS
QRS AXIS: 37 DEGREES
QRSD INTERVAL: 82 MS
QT INTERVAL: 584 MS
QTC INTERVAL: 452 MS
SPECIMEN SOURCE: ABNORMAL
T WAVE AXIS: 58 DEGREES
VENTRICULAR RATE: 36 BPM

## 2022-07-20 PROCEDURE — C1874 STENT, COATED/COV W/DEL SYS: HCPCS | Performed by: INTERNAL MEDICINE

## 2022-07-20 PROCEDURE — C1725 CATH, TRANSLUMIN NON-LASER: HCPCS | Performed by: INTERNAL MEDICINE

## 2022-07-20 PROCEDURE — 93458 L HRT ARTERY/VENTRICLE ANGIO: CPT | Performed by: INTERNAL MEDICINE

## 2022-07-20 PROCEDURE — 93005 ELECTROCARDIOGRAM TRACING: CPT

## 2022-07-20 PROCEDURE — C1887 CATHETER, GUIDING: HCPCS | Performed by: INTERNAL MEDICINE

## 2022-07-20 PROCEDURE — 92928 PRQ TCAT PLMT NTRAC ST 1 LES: CPT | Performed by: INTERNAL MEDICINE

## 2022-07-20 PROCEDURE — NC001 PR NO CHARGE: Performed by: NURSE PRACTITIONER

## 2022-07-20 PROCEDURE — 85347 COAGULATION TIME ACTIVATED: CPT

## 2022-07-20 PROCEDURE — C1769 GUIDE WIRE: HCPCS | Performed by: INTERNAL MEDICINE

## 2022-07-20 PROCEDURE — C9600 PERC DRUG-EL COR STENT SING: HCPCS | Performed by: INTERNAL MEDICINE

## 2022-07-20 PROCEDURE — 99152 MOD SED SAME PHYS/QHP 5/>YRS: CPT | Performed by: INTERNAL MEDICINE

## 2022-07-20 PROCEDURE — C1894 INTRO/SHEATH, NON-LASER: HCPCS | Performed by: INTERNAL MEDICINE

## 2022-07-20 PROCEDURE — 99153 MOD SED SAME PHYS/QHP EA: CPT | Performed by: INTERNAL MEDICINE

## 2022-07-20 PROCEDURE — 93010 ELECTROCARDIOGRAM REPORT: CPT | Performed by: INTERNAL MEDICINE

## 2022-07-20 PROCEDURE — NC001 PR NO CHARGE: Performed by: INTERNAL MEDICINE

## 2022-07-20 DEVICE — EVEROLIMUS-ELUTING PLATINUM CHROMIUM CORONARY STENT SYSTEM
Type: IMPLANTABLE DEVICE | Site: CORONARY | Status: FUNCTIONAL
Brand: SYNERGY™

## 2022-07-20 RX ORDER — ATORVASTATIN CALCIUM 40 MG/1
80 TABLET, FILM COATED ORAL EVERY EVENING
Status: DISCONTINUED | OUTPATIENT
Start: 2022-07-20 | End: 2022-07-21 | Stop reason: HOSPADM

## 2022-07-20 RX ORDER — NITROGLYCERIN 0.4 MG/1
0.4 TABLET SUBLINGUAL
Status: DISCONTINUED | OUTPATIENT
Start: 2022-07-20 | End: 2022-07-21 | Stop reason: HOSPADM

## 2022-07-20 RX ORDER — VERAPAMIL HCL 2.5 MG/ML
AMPUL (ML) INTRAVENOUS AS NEEDED
Status: DISCONTINUED | OUTPATIENT
Start: 2022-07-20 | End: 2022-07-20 | Stop reason: HOSPADM

## 2022-07-20 RX ORDER — ONDANSETRON 2 MG/ML
4 INJECTION INTRAMUSCULAR; INTRAVENOUS EVERY 8 HOURS PRN
Status: DISCONTINUED | OUTPATIENT
Start: 2022-07-20 | End: 2022-07-21 | Stop reason: HOSPADM

## 2022-07-20 RX ORDER — CLOPIDOGREL BISULFATE 75 MG/1
75 TABLET ORAL DAILY
Status: DISCONTINUED | OUTPATIENT
Start: 2022-07-21 | End: 2022-07-21 | Stop reason: HOSPADM

## 2022-07-20 RX ORDER — FENTANYL CITRATE 50 UG/ML
INJECTION, SOLUTION INTRAMUSCULAR; INTRAVENOUS AS NEEDED
Status: DISCONTINUED | OUTPATIENT
Start: 2022-07-20 | End: 2022-07-20 | Stop reason: HOSPADM

## 2022-07-20 RX ORDER — ASPIRIN 81 MG/1
81 TABLET, CHEWABLE ORAL DAILY
Status: DISCONTINUED | OUTPATIENT
Start: 2022-07-21 | End: 2022-07-21 | Stop reason: HOSPADM

## 2022-07-20 RX ORDER — HEPARIN SODIUM 1000 [USP'U]/ML
INJECTION, SOLUTION INTRAVENOUS; SUBCUTANEOUS AS NEEDED
Status: DISCONTINUED | OUTPATIENT
Start: 2022-07-20 | End: 2022-07-20 | Stop reason: HOSPADM

## 2022-07-20 RX ORDER — NITROGLYCERIN 20 MG/100ML
INJECTION INTRAVENOUS AS NEEDED
Status: DISCONTINUED | OUTPATIENT
Start: 2022-07-20 | End: 2022-07-20 | Stop reason: HOSPADM

## 2022-07-20 RX ORDER — SODIUM CHLORIDE 9 MG/ML
100 INJECTION, SOLUTION INTRAVENOUS CONTINUOUS
Status: DISCONTINUED | OUTPATIENT
Start: 2022-07-20 | End: 2022-07-20

## 2022-07-20 RX ORDER — SODIUM CHLORIDE 9 MG/ML
125 INJECTION, SOLUTION INTRAVENOUS CONTINUOUS
Status: DISPENSED | OUTPATIENT
Start: 2022-07-20 | End: 2022-07-20

## 2022-07-20 RX ORDER — ATORVASTATIN CALCIUM 80 MG/1
80 TABLET, FILM COATED ORAL EVERY EVENING
Qty: 30 TABLET | Refills: 3 | Status: CANCELLED | OUTPATIENT
Start: 2022-07-20

## 2022-07-20 RX ORDER — CLOPIDOGREL BISULFATE 75 MG/1
600 TABLET ORAL ONCE
Status: COMPLETED | OUTPATIENT
Start: 2022-07-20 | End: 2022-07-20

## 2022-07-20 RX ORDER — ASPIRIN 81 MG/1
324 TABLET, CHEWABLE ORAL ONCE
Status: COMPLETED | OUTPATIENT
Start: 2022-07-20 | End: 2022-07-20

## 2022-07-20 RX ORDER — MIDAZOLAM HYDROCHLORIDE 2 MG/2ML
INJECTION, SOLUTION INTRAMUSCULAR; INTRAVENOUS AS NEEDED
Status: DISCONTINUED | OUTPATIENT
Start: 2022-07-20 | End: 2022-07-20 | Stop reason: HOSPADM

## 2022-07-20 RX ORDER — LIDOCAINE HYDROCHLORIDE 10 MG/ML
INJECTION, SOLUTION EPIDURAL; INFILTRATION; INTRACAUDAL; PERINEURAL AS NEEDED
Status: DISCONTINUED | OUTPATIENT
Start: 2022-07-20 | End: 2022-07-20 | Stop reason: HOSPADM

## 2022-07-20 RX ORDER — ACETAMINOPHEN 325 MG/1
650 TABLET ORAL EVERY 6 HOURS PRN
Status: DISCONTINUED | OUTPATIENT
Start: 2022-07-20 | End: 2022-07-21 | Stop reason: HOSPADM

## 2022-07-20 RX ADMIN — SODIUM CHLORIDE 100 ML/HR: 0.9 INJECTION, SOLUTION INTRAVENOUS at 07:17

## 2022-07-20 RX ADMIN — ATORVASTATIN CALCIUM 80 MG: 40 TABLET, FILM COATED ORAL at 17:23

## 2022-07-20 RX ADMIN — SODIUM CHLORIDE 125 ML/HR: 0.9 INJECTION, SOLUTION INTRAVENOUS at 13:00

## 2022-07-20 RX ADMIN — CLOPIDOGREL BISULFATE 600 MG: 75 TABLET ORAL at 20:50

## 2022-07-20 RX ADMIN — ASPIRIN 324 MG: 81 TABLET, CHEWABLE ORAL at 07:18

## 2022-07-20 NOTE — PLAN OF CARE
Problem: PAIN - ADULT  Goal: Verbalizes/displays adequate comfort level or baseline comfort level  Description: Interventions:  - Encourage patient to monitor pain and request assistance  - Assess pain using appropriate pain scale  - Administer analgesics based on type and severity of pain and evaluate response  - Implement non-pharmacological measures as appropriate and evaluate response  - Consider cultural and social influences on pain and pain management  - Notify physician/advanced practitioner if interventions unsuccessful or patient reports new pain  Outcome: Progressing     Problem: INFECTION - ADULT  Goal: Absence or prevention of progression during hospitalization  Description: INTERVENTIONS:  - Assess and monitor for signs and symptoms of infection  - Monitor lab/diagnostic results  - Monitor all insertion sites, i e  indwelling lines, tubes, and drains  - Monitor endotracheal if appropriate and nasal secretions for changes in amount and color  - [place] appropriate cooling/warming therapies per order  - Administer medications as ordered  - Instruct and encourage patient and family to use good hand hygiene technique  - Identify and instruct in appropriate isolation precautions for identified infection/condition  Outcome: Progressing  Goal: Absence of fever/infection during neutropenic period  Description: INTERVENTIONS:  - Monitor WBC    Outcome: Progressing     Problem: SAFETY ADULT  Goal: Patient will remain free of falls  Description: INTERVENTIONS:  - Educate patient/family on patient safety including physical limitations  - Instruct patient to call for assistance with activity   - Consult OT/PT to assist with strengthening/mobility   - Keep Call bell within reach  - Keep bed low and locked with side rails adjusted as appropriate  - Keep care items and personal belongings within reach  - Initiate and maintain comfort rounds  - Make Fall Risk Sign visible to staff  - Offer Toileting every 2 Hours, in advance of need  - Initiate/Maintain bed alarm  - Apply yellow socks and bracelet for high fall risk patients  - Consider moving patient to room near nurses station  Outcome: Progressing  Goal: Maintain or return to baseline ADL function  Description: INTERVENTIONS:  -  Assess patient's ability to carry out ADLs; assess patient's baseline for ADL function and identify physical deficits which impact ability to perform ADLs (bathing, care of mouth/teeth, toileting, grooming, dressing, etc )  - Assess/evaluate cause of self-care deficits   - Assess range of motion  - Assess patient's mobility; develop plan if impaired  - Assess patient's need for assistive devices and provide as appropriate  - Encourage maximum independence but intervene and supervise when necessary  - Involve family in performance of ADLs  - Assess for home care needs following discharge   - Consider OT consult to assist with ADL evaluation and planning for discharge  - Provide patient education as appropriate  Outcome: Progressing  Goal: Maintains/Returns to pre admission functional level  Description: INTERVENTIONS:  - Perform BMAT or MOVE assessment daily    - Set and communicate daily mobility goal to care team and patient/family/caregiver  - Collaborate with rehabilitation services on mobility goals if consulted  - Perform Range of Motion 3 times a day  - Reposition patient every 2 hours    - Dangle patient 3 times a day  - Stand patient 3 times a day  - Ambulate patient 3 times a day  - Out of bed to chair 3 times a day   - Out of bed for meals 3 times a day  - Out of bed for toileting  - Record patient progress and toleration of activity level   Outcome: Progressing     Problem: DISCHARGE PLANNING  Goal: Discharge to home or other facility with appropriate resources  Description: INTERVENTIONS:  - Identify barriers to discharge w/patient and caregiver  - Arrange for needed discharge resources and transportation as appropriate  - Identify discharge learning needs (meds, wound care, etc )  - Arrange for interpretive services to assist at discharge as needed  - Refer to Case Management Department for coordinating discharge planning if the patient needs post-hospital services based on physician/advanced practitioner order or complex needs related to functional status, cognitive ability, or social support system  Outcome: Progressing     Problem: Knowledge Deficit  Goal: Patient/family/caregiver demonstrates understanding of disease process, treatment plan, medications, and discharge instructions  Description: Complete learning assessment and assess knowledge base    Interventions:  - Provide teaching at level of understanding  - Provide teaching via preferred learning methods  Outcome: Progressing     Problem: CARDIOVASCULAR - ADULT  Goal: Maintains optimal cardiac output and hemodynamic stability  Description: INTERVENTIONS:  - Monitor I/O, vital signs and rhythm  - Monitor for S/S and trends of decreased cardiac output  - Administer and titrate ordered vasoactive medications to optimize hemodynamic stability  - Assess quality of pulses, skin color and temperature  - Assess for signs of decreased coronary artery perfusion  - Instruct patient to report change in severity of symptoms  Outcome: Progressing  Goal: Absence of cardiac dysrhythmias or at baseline rhythm  Description: INTERVENTIONS:  - Continuous cardiac monitoring, vital signs, obtain 12 lead EKG if ordered  - Administer antiarrhythmic and heart rate control medications as ordered  - Monitor electrolytes and administer replacement therapy as ordered  Outcome: Progressing

## 2022-07-20 NOTE — QUICK NOTE
Patient underwent cardiac catheterization by right radial approach  Cardiac catheterization shows nonobstructive disease involving major epicardial vessels of left main, lad and circumflex  Distal circumflex is 100% occluded and has left-to-left collaterals  Patient's RCA is a codominant medium size artery which has 85% , 2 sequential lesion  Patient has successful stenting of mid RCA done with 2 25 mm x20 me to send up to 14 atmospheric pressure  Patient tolerated procedure well  Patient is known to have sick sinus syndrome with chronotropic incompetency and his average heart rate on monitoring was around 49 beats per minute  He denies any symptoms at some point he will need pacemaker this was discussed with him  All his family member have pacemaker  Continue dual platelet therapy at least for 1 year  Continue statins continue other medication avoid beta-blockers    Discussed with cardiology team at [facility]

## 2022-07-20 NOTE — DISCHARGE SUMMARY
Discharge Summary - Estela Flowers 59 y o  male MRN: [id number]    Unit/Bed#: S -01 Encounter: [id number]    Admission Date: 7/20/2022   Discharge Date: 7/21/2022    Disposition: Home    PCP: Dr Kell Stephens  OP Cardiologist: Dr Rogena Spatz  Interventional cardiologist:  Dr Rogena Spatz    Discharge Diagnosis: CAD s/p PCI (KADEEM x1 to [facility])    Condition at Discharge: good     Procedures: [facility]    /67   Pulse (!) 37   Temp 98 2 °F (36 8 °C)   Resp 19   Ht 5' 10" (1 778 m)   Wt 116 kg (255 lb 15 3 oz)   SpO2 97%   BMI 36 73 kg/m²      Review of Systems   Constitutional: Negative  Cardiovascular: Positive for irregular heartbeat  Respiratory: Negative  Skin: Negative  Neurological: Negative  Physical Exam  HENT:      Head: Normocephalic  Cardiovascular:      Rate and Rhythm: Normal rate  Pulses: Normal pulses  Pulmonary:      Effort: Pulmonary effort is normal    Abdominal:      Palpations: Abdomen is soft  Skin:     General: Skin is warm and dry  Neurological:      General: No focal deficit present  Mental Status: He is alert  HPI and Hospital Course: Gay Magallanes is a 61-year-old male with history of dyslipidemia, sick sinus syndrome, obesity, marijuana abuse, and possible sleep apnea who presented to the hospital yesterday for elective cardiac catheterization  Patient has had ongoing exertional shortness of breath for which he underwent a stress test on 05/08/2022 that demonstrated a small, reversible perfusion defect of the entire inferior wall  Cardiac catheterization yesterday revealed nonobstructive CAD involving major epicardial vessels of left main, LAD, and circumflex  Specifically, the distal circumflex dpl197% occluded with collaterals and RCA was 85% stenosed for which he received KADEEM x1 (2 25 mm x 20 mm)  Post PCI, patient was noted to have heart rates in the 30s to 40s    Given known sick sinus syndrome with chronotropic incompetency, Dr Rogena Spatz did discuss the need for a pacemaker with him as he will need to be on beta-blocker therapy in the setting of his CAD for which patient is agreeable  For now, beta-blockers will be avoided  An outpatient referral to cardiac rehab has been placed and patient will be discharged on aspirin 81 mg p o  Daily, atorvastatin 80 mg p o  Daily, and Plavix 75 mg p o  Daily as Brilinta was not affordable per his insurance  Patient has outpatient f/u with Dr Gopal Fox on August 12th at 2pm      7/21 Addendum:  Patient noted to have daytime bradycardia into the 30s with junctional bradycardia  Otherwise daytime HR 40-50's  Not lightheaded dizziness/syncope  At times PACs for which he feels palpitations  HR  Per Dr Gopal Fox patient will have a pacemaker placed next week  His office will be in contact with him  This was relayed to the patient  All questions answered  He is not to drive which he is aware  No beta-blockers at this time  Discharge Medications:  See after visit summary for reconciled discharge medications provided to patient and family  Pertinent Labs/diagnostics:      CBC with diff: Results from last 7 days   Lab Units 07/18/22  1351   WHITE BLOOD CELL COUNT  x10E3/uL 8 3   HEMOGLOBIN  g/dL 15 8   HEMATOCRIT  % 46 4   MCV  fL 87   PLATELETS  P17R7/ 747   MCH  pg 29 6   MCHC  g/dL 34 1   RDW  % 13 1     CMP:  Results from last 7 days   Lab Units 07/18/22  1351   POTASSIUM mmol/L 4 4   CHLORIDE mmol/L 101   CO2 mmol/L 22   BUN mg/dL 13   CREATININE mg/dL 1 01   EGFR mL/min/1 73 86     Lipid Profile:   No results found for: CHOL  Lab Results   Component Value Date    HDL 47 03/10/2022     Lab Results   Component Value Date    LDLCALC 151 (H) 03/10/2022     Lab Results   Component Value Date    TRIG 161 (H) 03/10/2022     Cardiac testing:   No results found for this or any previous visit  No results found for this or any previous visit  No valid procedures specified    Results for orders placed during the hospital encounter of 05/03/22    NM myocardial perfusion spect (stress and/or rest)    Interpretation Summary    Abnormal pharmacologic nuclear stress test   There is a small amount of reversible ischemia noted in the inferior wall with a summed differential score of 5  Patient developed transient junctional rhythm during the recovery along with profound bradycardia    Stress ECG: Up to 1 0 mm upsloping and horizontal ST depression in the inferior leads is noted  Arrhythmias during recovery:  Junctional rhythm, rare PACs, rare PVCs  The ECG was equivocal for ischemia  The stress ECG is equivocal for ischemia after pharmacologic stress, without reproduction of symptoms    Perfusion: There is a small reversible perfusion defect that is noted in the entire inferior wall  Summed stress score is 5 and differential score is also 5     Stress Function: Over Left ventricular function post-stress is normal   Calculated ejection fraction is 64%    Perfusion Defect Conclusion: There is no evidence of transient ischemic dilation (TID)  Discharge instructions/Information to patient and family:   See after visit summary for information provided to patient and family  Provisions for Follow-Up Care:  See after visit summary for information related to follow-up care and any pertinent home health orders  Planned Readmission: No    Discharge Statement   I spent 30 minutes minutes discharging the patient  This time was spent on the day of discharge  I had direct contact with the patient on the day of discharge  Additional documentation is required if more than 30 minutes were spent on discharge  ** Please Note: Dragon 360 Dictation voice to text software may have been used in the creation of this document   **

## 2022-07-20 NOTE — INTERVAL H&P NOTE
Update: (This section must be completed if the H&P was completed greater than 24 hrs to procedure or admission)    Patient seen and evaluated the cath lab  Patient's progress note reviewed  Spoke to patient's wife  Patient scheduled for cardiac catheterization due to shortness of breath inconclusive stress test and multiple risk factors  All risks benefit alternate discussed with patient  Risk of heart attack stroke even death but not limited to it discussed with patient he wishes to proceed consent was obtained  Patient re-evaluated   Accept as history and physical     Master Kahn MD/July 20, 2022/7:36 AM

## 2022-07-20 NOTE — CASE MANAGEMENT
Case Management Progress Note    Patient name Kumar Crump  Location AN CATH LAB VIR/AN CATH * MRN [id number]  : 1965 Date 2022       LOS (days): 0  Geometric Mean LOS (GMLOS) (days):   Days to [facility]:        OBJECTIVE:        Current admission status: Outpatient Surgery  Preferred Pharmacy:   [facility] [address] [address] [facility] [id number]  Phone: [phone] Fax: [phone]    Primary Care Provider: Sergio Calloway DO    Primary Insurance: [employer]  Secondary Insurance:     PROGRESS NOTE:  Per the Pt's local pharmacy of [facility] in [place], the Pt would have the financial responsibility of $[phone] for his Brilinta

## 2022-07-21 VITALS
HEART RATE: 50 BPM | BODY MASS INDEX: 36.64 KG/M2 | DIASTOLIC BLOOD PRESSURE: 65 MMHG | SYSTOLIC BLOOD PRESSURE: 127 MMHG | OXYGEN SATURATION: 98 % | TEMPERATURE: 98.4 F | HEIGHT: 70 IN | WEIGHT: 255.95 LBS | RESPIRATION RATE: 18 BRPM

## 2022-07-21 LAB
ANION GAP SERPL CALCULATED.3IONS-SCNC: 8 MMOL/L (ref 4–13)
BUN SERPL-MCNC: 15 MG/DL (ref 5–25)
CALCIUM SERPL-MCNC: 8.7 MG/DL (ref 8.4–10.2)
CHLORIDE SERPL-SCNC: 106 MMOL/L (ref 96–108)
CO2 SERPL-SCNC: 24 MMOL/L (ref 21–32)
CREAT SERPL-MCNC: 1 MG/DL (ref 0.6–1.3)
ERYTHROCYTE [DISTWIDTH] IN BLOOD BY AUTOMATED COUNT: 12.7 % (ref 11.6–15.1)
GFR SERPL CREATININE-BSD FRML MDRD: 82 ML/MIN/1.73SQ M
GLUCOSE SERPL-MCNC: 101 MG/DL (ref 65–140)
HCT VFR BLD AUTO: 43.1 % (ref 36.5–49.3)
HGB BLD-MCNC: 15.1 G/DL (ref 12–17)
MCH RBC QN AUTO: 30.1 PG (ref 26.8–34.3)
MCHC RBC AUTO-ENTMCNC: 35 G/DL (ref 31.4–37.4)
MCV RBC AUTO: 86 FL (ref 82–98)
PLATELET # BLD AUTO: 254 THOUSANDS/UL (ref 149–390)
PMV BLD AUTO: 11.1 FL (ref 8.9–12.7)
POTASSIUM SERPL-SCNC: 3.7 MMOL/L (ref 3.5–5.3)
RBC # BLD AUTO: 5.02 MILLION/UL (ref 3.88–5.62)
SODIUM SERPL-SCNC: 138 MMOL/L (ref 135–147)
WBC # BLD AUTO: 10.21 THOUSAND/UL (ref 4.31–10.16)

## 2022-07-21 PROCEDURE — 80048 BASIC METABOLIC PNL TOTAL CA: CPT | Performed by: NURSE PRACTITIONER

## 2022-07-21 PROCEDURE — 85027 COMPLETE CBC AUTOMATED: CPT | Performed by: NURSE PRACTITIONER

## 2022-07-21 RX ORDER — CLOPIDOGREL BISULFATE 75 MG/1
75 TABLET ORAL DAILY
Qty: 30 TABLET | Refills: 11 | Status: SHIPPED | OUTPATIENT
Start: 2022-07-21

## 2022-07-21 RX ORDER — ROSUVASTATIN CALCIUM 5 MG/1
10 TABLET, COATED ORAL DAILY
Qty: 90 TABLET | Refills: 0 | Status: SHIPPED | OUTPATIENT
Start: 2022-07-21 | End: 2022-07-26 | Stop reason: SDUPTHER

## 2022-07-21 RX ORDER — NITROGLYCERIN 0.4 MG/1
0.4 TABLET SUBLINGUAL
Qty: 30 TABLET | Refills: 1 | Status: SHIPPED | OUTPATIENT
Start: 2022-07-21 | End: 2022-07-21

## 2022-07-21 RX ADMIN — CLOPIDOGREL BISULFATE 75 MG: 75 TABLET ORAL at 08:17

## 2022-07-21 RX ADMIN — ASPIRIN 81 MG CHEWABLE TABLET 81 MG: 81 TABLET CHEWABLE at 08:17

## 2022-07-21 NOTE — PLAN OF CARE
Problem: PAIN - ADULT  Goal: Verbalizes/displays adequate comfort level or baseline comfort level  Description: Interventions:  - Encourage patient to monitor pain and request assistance  - Assess pain using appropriate pain scale  - Administer analgesics based on type and severity of pain and evaluate response  - Implement non-pharmacological measures as appropriate and evaluate response  - Consider cultural and social influences on pain and pain management  - Notify physician/advanced practitioner if interventions unsuccessful or patient reports new pain  Outcome: Progressing     Problem: INFECTION - ADULT  Goal: Absence or prevention of progression during hospitalization  Description: INTERVENTIONS:  - Assess and monitor for signs and symptoms of infection  - Monitor lab/diagnostic results  - Monitor all insertion sites, i e  indwelling lines, tubes, and drains  - Monitor endotracheal if appropriate and nasal secretions for changes in amount and color  - [place] appropriate cooling/warming therapies per order  - Administer medications as ordered  - Instruct and encourage patient and family to use good hand hygiene technique  - Identify and instruct in appropriate isolation precautions for identified infection/condition  Outcome: Progressing  Goal: Absence of fever/infection during neutropenic period  Description: INTERVENTIONS:  - Monitor WBC    Outcome: Progressing     Problem: SAFETY ADULT  Goal: Patient will remain free of falls  Description: INTERVENTIONS:  - Educate patient/family on patient safety including physical limitations  - Instruct patient to call for assistance with activity   - Consult OT/PT to assist with strengthening/mobility   - Keep Call bell within reach  - Keep bed low and locked with side rails adjusted as appropriate  - Keep care items and personal belongings within reach  - Initiate and maintain comfort rounds  - Make Fall Risk Sign visible to staff  - Offer Toileting every  Hours, in advance of need  - Initiate/Maintain alarm  - Obtain necessary fall risk management equipment:   - Apply yellow socks and bracelet for high fall risk patients  - Consider moving patient to room near nurses station  Outcome: Progressing  Goal: Maintain or return to baseline ADL function  Description: INTERVENTIONS:  -  Assess patient's ability to carry out ADLs; assess patient's baseline for ADL function and identify physical deficits which impact ability to perform ADLs (bathing, care of mouth/teeth, toileting, grooming, dressing, etc )  - Assess/evaluate cause of self-care deficits   - Assess range of motion  - Assess patient's mobility; develop plan if impaired  - Assess patient's need for assistive devices and provide as appropriate  - Encourage maximum independence but intervene and supervise when necessary  - Involve family in performance of ADLs  - Assess for home care needs following discharge   - Consider OT consult to assist with ADL evaluation and planning for discharge  - Provide patient education as appropriate  Outcome: Progressing  Goal: Maintains/Returns to pre admission functional level  Description: INTERVENTIONS:  - Perform BMAT or MOVE assessment daily    - Set and communicate daily mobility goal to care team and patient/family/caregiver  - Collaborate with rehabilitation services on mobility goals if consulted  - Perform Range of Motion  times a day  - Reposition patient every  hours    - Dangle patient  times a day  - Stand patient  times a day  - Ambulate patient  times a day  - Out of bed to chair  times a day   - Out of bed for meals times a day  - Out of bed for toileting  - Record patient progress and toleration of activity level   Outcome: Progressing     Problem: DISCHARGE PLANNING  Goal: Discharge to home or other facility with appropriate resources  Description: INTERVENTIONS:  - Identify barriers to discharge w/patient and caregiver  - Arrange for needed discharge resources and transportation as appropriate  - Identify discharge learning needs (meds, wound care, etc )  - Arrange for interpretive services to assist at discharge as needed  - Refer to Case Management Department for coordinating discharge planning if the patient needs post-hospital services based on physician/advanced practitioner order or complex needs related to functional status, cognitive ability, or social support system  Outcome: Progressing     Problem: Knowledge Deficit  Goal: Patient/family/caregiver demonstrates understanding of disease process, treatment plan, medications, and discharge instructions  Description: Complete learning assessment and assess knowledge base    Interventions:  - Provide teaching at level of understanding  - Provide teaching via preferred learning methods  Outcome: Progressing     Problem: CARDIOVASCULAR - ADULT  Goal: Maintains optimal cardiac output and hemodynamic stability  Description: INTERVENTIONS:  - Monitor I/O, vital signs and rhythm  - Monitor for S/S and trends of decreased cardiac output  - Administer and titrate ordered vasoactive medications to optimize hemodynamic stability  - Assess quality of pulses, skin color and temperature  - Assess for signs of decreased coronary artery perfusion  - Instruct patient to report change in severity of symptoms  Outcome: Progressing  Goal: Absence of cardiac dysrhythmias or at baseline rhythm  Description: INTERVENTIONS:  - Continuous cardiac monitoring, vital signs, obtain 12 lead EKG if ordered  - Administer antiarrhythmic and heart rate control medications as ordered  - Monitor electrolytes and administer replacement therapy as ordered  Outcome: Progressing

## 2022-07-21 NOTE — PLAN OF CARE
Problem: PAIN - ADULT  Goal: Verbalizes/displays adequate comfort level or baseline comfort level  Description: Interventions:  - Encourage patient to monitor pain and request assistance  - Assess pain using appropriate pain scale  - Administer analgesics based on type and severity of pain and evaluate response  - Implement non-pharmacological measures as appropriate and evaluate response  - Consider cultural and social influences on pain and pain management  - Notify physician/advanced practitioner if interventions unsuccessful or patient reports new pain  Outcome: Progressing     Problem: INFECTION - ADULT  Goal: Absence or prevention of progression during hospitalization  Description: INTERVENTIONS:  - Assess and monitor for signs and symptoms of infection  - Monitor lab/diagnostic results  - Monitor all insertion sites, i e  indwelling lines, tubes, and drains  - Monitor endotracheal if appropriate and nasal secretions for changes in amount and color  - [place] appropriate cooling/warming therapies per order  - Administer medications as ordered  - Instruct and encourage patient and family to use good hand hygiene technique  - Identify and instruct in appropriate isolation precautions for identified infection/condition  Outcome: Progressing  Goal: Absence of fever/infection during neutropenic period  Description: INTERVENTIONS:  - Monitor WBC    Outcome: Progressing     Problem: SAFETY ADULT  Goal: Patient will remain free of falls  Description: INTERVENTIONS:  - Educate patient/family on patient safety including physical limitations  - Instruct patient to call for assistance with activity   - Consult OT/PT to assist with strengthening/mobility   - Keep Call bell within reach  - Keep bed low and locked with side rails adjusted as appropriate  - Keep care items and personal belongings within reach  - Initiate and maintain comfort rounds  - Make Fall Risk Sign visible to staff  - Offer Toileting every 2 Hours, in advance of need  - Initiate/Maintain bed alarm  - Apply yellow socks and bracelet for high fall risk patients  - Consider moving patient to room near nurses station  Outcome: Progressing  Goal: Maintain or return to baseline ADL function  Description: INTERVENTIONS:  -  Assess patient's ability to carry out ADLs; assess patient's baseline for ADL function and identify physical deficits which impact ability to perform ADLs (bathing, care of mouth/teeth, toileting, grooming, dressing, etc )  - Assess/evaluate cause of self-care deficits   - Assess range of motion  - Assess patient's mobility; develop plan if impaired  - Assess patient's need for assistive devices and provide as appropriate  - Encourage maximum independence but intervene and supervise when necessary  - Involve family in performance of ADLs  - Assess for home care needs following discharge   - Consider OT consult to assist with ADL evaluation and planning for discharge  - Provide patient education as appropriate  Outcome: Progressing  Goal: Maintains/Returns to pre admission functional level  Description: INTERVENTIONS:  - Perform BMAT or MOVE assessment daily    - Set and communicate daily mobility goal to care team and patient/family/caregiver  - Collaborate with rehabilitation services on mobility goals if consulted  - Perform Range of Motion 3 times a day  - Reposition patient every 2 hours    - Dangle patient 3 times a day  - Stand patient 3 times a day  - Ambulate patient 3 times a day  - Out of bed to chair 3 times a day   - Out of bed for meals 3 times a day  - Out of bed for toileting  - Record patient progress and toleration of activity level   Outcome: Progressing     Problem: DISCHARGE PLANNING  Goal: Discharge to home or other facility with appropriate resources  Description: INTERVENTIONS:  - Identify barriers to discharge w/patient and caregiver  - Arrange for needed discharge resources and transportation as appropriate  - Identify discharge learning needs (meds, wound care, etc )  - Arrange for interpretive services to assist at discharge as needed  - Refer to Case Management Department for coordinating discharge planning if the patient needs post-hospital services based on physician/advanced practitioner order or complex needs related to functional status, cognitive ability, or social support system  Outcome: Progressing     Problem: Knowledge Deficit  Goal: Patient/family/caregiver demonstrates understanding of disease process, treatment plan, medications, and discharge instructions  Description: Complete learning assessment and assess knowledge base    Interventions:  - Provide teaching at level of understanding  - Provide teaching via preferred learning methods  Outcome: Progressing     Problem: CARDIOVASCULAR - ADULT  Goal: Maintains optimal cardiac output and hemodynamic stability  Description: INTERVENTIONS:  - Monitor I/O, vital signs and rhythm  - Monitor for S/S and trends of decreased cardiac output  - Administer and titrate ordered vasoactive medications to optimize hemodynamic stability  - Assess quality of pulses, skin color and temperature  - Assess for signs of decreased coronary artery perfusion  - Instruct patient to report change in severity of symptoms  Outcome: Progressing  Goal: Absence of cardiac dysrhythmias or at baseline rhythm  Description: INTERVENTIONS:  - Continuous cardiac monitoring, vital signs, obtain 12 lead EKG if ordered  - Administer antiarrhythmic and heart rate control medications as ordered  - Monitor electrolytes and administer replacement therapy as ordered  Outcome: Progressing

## 2022-07-26 ENCOUNTER — TELEPHONE (OUTPATIENT)
Dept: CARDIOLOGY CLINIC | Facility: CLINIC | Age: 57
End: 2022-07-26

## 2022-07-26 ENCOUNTER — OFFICE VISIT (OUTPATIENT)
Dept: CARDIOLOGY CLINIC | Facility: CLINIC | Age: 57
End: 2022-07-26
Payer: COMMERCIAL

## 2022-07-26 VITALS
HEIGHT: 70 IN | SYSTOLIC BLOOD PRESSURE: 130 MMHG | TEMPERATURE: 97 F | HEART RATE: 52 BPM | BODY MASS INDEX: 37.51 KG/M2 | OXYGEN SATURATION: 99 % | WEIGHT: 262 LBS | DIASTOLIC BLOOD PRESSURE: 80 MMHG

## 2022-07-26 DIAGNOSIS — R00.1 BRADYCARDIA: ICD-10-CM

## 2022-07-26 DIAGNOSIS — E78.5 DYSLIPIDEMIA: ICD-10-CM

## 2022-07-26 DIAGNOSIS — I25.10 CORONARY ARTERY DISEASE INVOLVING NATIVE CORONARY ARTERY OF NATIVE HEART WITHOUT ANGINA PECTORIS: ICD-10-CM

## 2022-07-26 DIAGNOSIS — R06.02 EXERTIONAL SHORTNESS OF BREATH: ICD-10-CM

## 2022-07-26 DIAGNOSIS — R00.2 PALPITATIONS: ICD-10-CM

## 2022-07-26 DIAGNOSIS — E66.9 OBESITY (BMI 30-39.9): ICD-10-CM

## 2022-07-26 DIAGNOSIS — Z95.5 S/P CORONARY ARTERY STENT PLACEMENT: ICD-10-CM

## 2022-07-26 PROCEDURE — 99215 OFFICE O/P EST HI 40 MIN: CPT | Performed by: INTERNAL MEDICINE

## 2022-07-26 RX ORDER — ROSUVASTATIN CALCIUM 20 MG/1
10 TABLET, COATED ORAL DAILY
Qty: 90 TABLET | Refills: 1 | Status: SHIPPED | OUTPATIENT
Start: 2022-07-26 | End: 2022-08-12 | Stop reason: SDUPTHER

## 2022-07-26 RX ORDER — ASPIRIN 81 MG/1
324 TABLET, CHEWABLE ORAL ONCE
Status: CANCELLED | OUTPATIENT
Start: 2022-07-26 | End: 2022-07-26

## 2022-07-26 NOTE — TELEPHONE ENCOUNTER
Patient of Dr Clare Collier scheduled for Permanent pacemaker on Thusday Aug 4  At [facility] [facility]

## 2022-07-26 NOTE — H&P (VIEW-ONLY)
Progress note - Cardiology Office   [facility]  Harlan Peralta 59 y o  male MRN: [id number]  : 1965  Unit/Bed#:  Encounter: [id number]      Assessment:     1  Exertional shortness of breath    2  Dyslipidemia    3  Bradycardia    4  Coronary artery disease involving native coronary artery of native heart without angina pectoris    5  Palpitations    6  S/P coronary artery stent placement    7  Obesity (BMI 30-39  9)        Discussion summary and Plan:    1  Exertional shortness of breath  Patient has exertional shortness of breath but seems little better  He had cardiac catheterization found to have distal circumflex 100% occluded and codominant right has 80-85% successful stenting of RCA was done and medical therapy for his circumflex  Nonobstructive disease of LAD noted  2  Bradycardia with PACs  Patient is having frequent palpitations  While he was in the hospital under monitoring he was noted to have episodes of junctional bradycardia, heart rate dropping to 30-35 beats per minute  He also had frequent palpitations unfortunately we cannot give him any medication due to size significant bradycardia  He has a chronotropic incompetency with his heart rate not able to go up with exercise  He had a family history of pacemaker  We discussed with him issue at length  At this time he had a class 2A indication to have a permanent pacemaker which was discussed with him he agrees with the plan and he will be scheduled for pacemaker  All risks benefit alternate complication of the procedure discussed with him and his wife  3  Dyslipidemia  His risk for cardiovascular event is high  He has a nonobstructive coronary artery disease as well as obstructive coronary artery disease will increase Crestor to 20 mg daily      4  Coronary artery disease status post angioplasty of codominant RCA and has 100% occluded distal circumflex which will be managed with medical therapy continue dual platelet therapy    5  Obesity with BMI around 37 5 encouraged him to lose weight    6  History of marijuana use   As per patient has quit smoking  7  Possible sleep apnea  At some point he will need a sleep study    Plan  Patient will be scheduled for dual-chamber pacemaker at [facility]  All risks benefit discussed with him  Further plan as also pacemaker become available  Patient was advised and educated to call our office  immediately if  patient has any new symptoms of chest pain/shortness of breath, near-syncope, syncope, light headedness sustained palpitations  or any other cardiovascular symptoms before their scheduled follow-up appointment  Office #[phone]  Thank you for your consultation  If you have any question please call me at [phone]    Counseling :  A description of the counseling  Goals and Barriers  Patient's ability to self care: Yes  Medication side effect reviewed with patient in detail and all their questions answered to their satisfaction  Primary Care Physician : Filemon Moy DO          HPI :     Jose Antonio Lechuga is a 59y o  year old male who was referred by primary care doctor for exertional shortness of breath and palpitations  Patient who has medical history significant for bradycardia, obesity with BMI around 44 has noted lately he is having exertional shortness of breath and then racing of his heart  He had gained about 10-15 lb in the last year and since then he has noted when he climbs stairs or he walks he gets short of breath and occasionally get palpitations  He was noted to have first-degree AV block as well as episodes of bradycardia today also his heart rate is 56 beats per minute he has sinus arrhythmia  And first-degree AV block EKG also shows T-wave inversions and ST flattening in inferior leads along with Q-waves in inferior leads    He denies any personal history of MI in the past   There is no family history of premature coronary artery disease but his mother did have pacemaker  Other medical history significant for history of kidney stones  He never smoked but lately he started using marijuana mostly in the evening for some time to sleep  No nausea no vomiting no fever no chills no leg edema  Past surgical history  Multiple orthopedic surgery including shoulder surgery and biceps surgery  06/29/2022  Above reviewed  Patient was initially seen by us for exertion shortness of breath and palpitations  He also had history of bradycardia  Holter monitor shows his average heart rate was 49 beats per minute and he has frequent PACs defer 7 6% of his total beats  He underwent nuclear stress test   He tried walking on the treadmill he did walk for 8 minutes and he could only achieve 73% of his maximum predicted heart rate and then he became bradycardic  He did with symptoms of dizziness and lightheadedness and he was in junctional bradycardia and test was changed to pharmacological test   Which shows he had a EKG changes as well as ischemia  Holter monitor shows patient has bradycardia for 35 hours per  He admits that he snores at night  He feels he has more easy fatigue and tired but denies any symptoms of dizziness or lightheadedness  He has not passed out  There is a family history of pacemaker his mother also had a pacemaker and his father also had a pacemaker at some point of his life  07/06/2022  Above reviewed  Patient came for follow-up  He was initially seen for exertional shortness of breath and history of bradycardia  His Holter monitor shows he has heart rate average around 49 beats per minute  He has frequent PACs there were 7 6% of his total beats  He feels palpitations he has short atrial runs and we were not able to treat with beta-blocker due to his slow heart rate    While he was in the hospital he was noted to have significant episodes of bradycardia with heart rate dropping to mid 35s  He also feels fatigue and tired and he has junctional bradycardia  His cardiac catheterization shows he had a distal circ disease which is chronic total occlusion and he has codominant RCA successfully stented with drug-eluting stent  His cholesterol was high and he is taking his medications  He used to smoke marijuana he has quit smoking it  He feels fatigue and tired but denies any nausea and vomiting recently he felt couple episode of dizziness  He had a family history of pacemakers  He has no nausea he denies any syncopal episode at this time  He came with his wife  Review of Systems   Constitutional: Positive for fatigue  Negative for activity change, chills, diaphoresis, fever and unexpected weight change  HENT: Negative for congestion  Eyes: Negative for discharge and redness  Respiratory: Positive for shortness of breath  Negative for cough, chest tightness and wheezing  Cardiovascular: Positive for palpitations  Negative for chest pain and leg swelling  Gastrointestinal: Negative for abdominal pain, diarrhea and nausea  Endocrine: Negative  Genitourinary: Negative for decreased urine volume and urgency  Musculoskeletal: Positive for arthralgias  Negative for back pain and gait problem  Skin: Negative for rash and wound  Allergic/Immunologic: Negative  Neurological: Negative for dizziness, seizures, syncope, weakness, light-headedness and headaches  Hematological: Negative  Psychiatric/Behavioral: Negative for agitation and confusion  The patient is nervous/anxious          Historical Information   Past Medical History:   Diagnosis Date    Hyperlipidemia     Irregular heart beat     bradycardia post stress test, holter monitor on until 05/05/22 Dr Grant Kline Kidney stone      Past Surgical History:   Procedure Laterality Date    BICEPS TENDON REPAIR Bilateral     CARDIAC CATHETERIZATION N/A 7/20/2022    Procedure: Cardiac Coronary Angiogram; Surgeon: Rafaela Olvera MD;  [facility]; Service: Cardiology    CARDIAC CATHETERIZATION Left 7/20/2022    Procedure: Cardiac Left Heart Cath;  Surgeon: Rafaela Olvera MD;  [facility]; Service: Cardiology    CARDIAC CATHETERIZATION N/A 7/20/2022    Procedure: Cardiac pci;  Surgeon: Rafaela Olvera MD;  [facility]; Service: Cardiology    INGUINAL HERNIA REPAIR      LASIK Bilateral     ROTATOR CUFF REPAIR Right     VASECTOMY       Social History     Substance and Sexual Activity   Alcohol Use Not Currently    Comment: rare     Social History     Substance and Sexual Activity   Drug Use Not Currently    Types: Marijuana     Social History     Tobacco Use   Smoking Status Never Smoker   Smokeless Tobacco Never Used     Family History:   Family History   Problem Relation Age of Onset    Hypertension Mother     Diabetes Mother     Hypertension Father     Colon polyps Father     Mental illness Neg Hx        Meds/Allergies     No Known Allergies    Current Outpatient Medications:     aspirin (ECOTRIN LOW STRENGTH) 81 mg EC tablet, Take 1 tablet (81 mg total) by mouth daily, Disp: 100 tablet, Rfl: 1    clopidogrel (PLAVIX) 75 mg tablet, Take 1 tablet (75 mg total) by mouth daily, Disp: 30 tablet, Rfl: 11    rosuvastatin (CRESTOR) 20 MG tablet, Take 0 5 tablets (10 mg total) by mouth daily, Disp: 90 tablet, Rfl: 1    Vitals: Blood pressure 130/80, pulse (!) 52, temperature (!) 97 °F (36 1 °C), height 5' 10" (1 778 m), weight 119 kg (262 lb), SpO2 99 %  ?  Body mass index is 37 59 kg/m²  Wt Readings from Last 3 Encounters:   07/26/22 119 kg (262 lb)   07/20/22 116 kg (255 lb 15 3 oz)   06/29/22 121 kg (266 lb)     Vitals:    07/26/22 1140   Weight: 119 kg (262 lb)     BP Readings from Last 3 Encounters:   07/26/22 130/80   07/21/22 127/65   06/29/22 128/76         Physical Exam  Constitutional:       General: He is not in acute distress  Appearance: He is well-developed  He is not diaphoretic  Neck:      Thyroid: No thyromegaly  Vascular: No JVD  Trachea: No tracheal deviation  Cardiovascular:      Rate and Rhythm: Regular rhythm  Bradycardia present  Heart sounds: S1 normal and S2 normal  Heart sounds not distant  Murmur heard  Systolic (ejection) murmur is present with a grade of 2/6  No friction rub  No gallop  No S3 or S4 sounds  Pulmonary:      Effort: Pulmonary effort is normal  No respiratory distress  Breath sounds: Normal breath sounds  No wheezing or rales  Chest:      Chest wall: No tenderness  Abdominal:      General: Bowel sounds are normal  There is no distension  Palpations: Abdomen is soft  Tenderness: There is no abdominal tenderness  Musculoskeletal:         General: No deformity  Cervical back: Neck supple  Skin:     General: Skin is warm and dry  Coloration: Skin is not pale  Findings: No rash  Neurological:      Mental Status: He is alert and oriented to person, place, and time  Psychiatric:         Behavior: Behavior normal          Judgment: Judgment normal             Diagnostic Studies Review Cardio:    Echo Doppler 05/03/2022 shows normal LV systolic function EF 21-26%, borderline LV thickness  No significant valvular disease  Nuclear stress test   Nuclear stress test shows small reversible ischemia in the inferior wall with sum score of 5  Patient did have some junctional rhythm during recovery along with bradycardia  EKG shows 1 mm ST depression which was upsloping and horizontal   EF was 64%  Holter monitor shows patient has average heart rate is 49 beats per minute  Maximum heart rate was 114 beats per minute  Patient has frequent PACs there were 7 6% of the total beats  Patient was in bradycardia for 35 hours  He has a 2 9 seconds pause during sleep hours        EKG:    Twelve lead EKG done on 04/07/2022 shows sinus bradycardia heart rate 56 beats per minute first-degree AV block  Q-wave noted in inferior leads with ST flattening cannot rule out inferior wall infarction    Imaging:  Chest X-Ray:   No Chest XR results available for this patient  CT-scan of the chest:     No CTA results available for this patient  Lab Review     BMP:  Lab Results   Component Value Date    SODIUM 138 07/21/2022    K 3 7 07/21/2022     07/21/2022    CO2 24 07/21/2022    BUN 15 07/21/2022    CREATININE 1 00 07/21/2022    GLUC 101 07/21/2022    CALCIUM 8 7 07/21/2022    EGFR 82 07/21/2022     Troponins:    LFT:  Lab Results   Component Value Date    AST 20 03/10/2022    ALT 25 03/10/2022    TP 6 8 03/10/2022    ALB 4 5 03/10/2022      Lipid Profile:   Lab Results   Component Value Date    CHOLESTEROL 227 (H) 03/10/2022    HDL 47 03/10/2022    LDLCALC 151 (H) 03/10/2022    TRIG 161 (H) 03/10/2022     Lab Results   Component Value Date    CHOLESTEROL 227 (H) 03/10/2022     The 10-year ASCVD risk score (Varinder De Paz et al , 2013) is: 9 6%    Values used to calculate the score:      Age: 59 years      Sex: Male      Is Non- : No      Diabetic: No      Tobacco smoker: No      Systolic Blood Pressure: 455 mmHg      Is BP treated: No      HDL Cholesterol: 47 mg/dL      Total Cholesterol: 227 mg/dL      MD Kathie Tolbert      "This note has been constructed using a voice recognition system  Therefore there may be syntax, spelling, and/or grammatical errors   Please call if you have any questions  "

## 2022-08-04 ENCOUNTER — APPOINTMENT (OUTPATIENT)
Dept: RADIOLOGY | Facility: HOSPITAL | Age: 57
End: 2022-08-04
Payer: COMMERCIAL

## 2022-08-04 ENCOUNTER — HOSPITAL ENCOUNTER (OUTPATIENT)
Facility: HOSPITAL | Age: 57
Setting detail: OUTPATIENT SURGERY
Discharge: HOME | End: 2022-08-05
Attending: INTERNAL MEDICINE | Admitting: INTERNAL MEDICINE
Payer: COMMERCIAL

## 2022-08-04 DIAGNOSIS — R00.2 PALPITATIONS: ICD-10-CM

## 2022-08-04 DIAGNOSIS — I25.10 CORONARY ARTERY DISEASE INVOLVING NATIVE CORONARY ARTERY OF NATIVE HEART WITHOUT ANGINA PECTORIS: ICD-10-CM

## 2022-08-04 DIAGNOSIS — R00.1 BRADYCARDIA: ICD-10-CM

## 2022-08-04 DIAGNOSIS — R06.02 EXERTIONAL SHORTNESS OF BREATH: ICD-10-CM

## 2022-08-04 DIAGNOSIS — E78.5 DYSLIPIDEMIA: ICD-10-CM

## 2022-08-04 DIAGNOSIS — Z95.5 S/P CORONARY ARTERY STENT PLACEMENT: ICD-10-CM

## 2022-08-04 LAB
ANION GAP SERPL CALCULATED.3IONS-SCNC: 10 MMOL/L (ref 4–13)
ATRIAL RATE: 60 BPM
BUN SERPL-MCNC: 15 MG/DL (ref 5–25)
CALCIUM SERPL-MCNC: 8.7 MG/DL (ref 8.3–10.1)
CHLORIDE SERPL-SCNC: 102 MMOL/L (ref 96–108)
CO2 SERPL-SCNC: 26 MMOL/L (ref 21–32)
CREAT SERPL-MCNC: 1.32 MG/DL (ref 0.6–1.3)
ERYTHROCYTE [DISTWIDTH] IN BLOOD BY AUTOMATED COUNT: 12.7 % (ref 11.6–15.1)
GFR SERPL CREATININE-BSD FRML MDRD: 58 ML/MIN/1.73SQ M
GLUCOSE P FAST SERPL-MCNC: 111 MG/DL (ref 65–99)
GLUCOSE SERPL-MCNC: 111 MG/DL (ref 65–140)
HCT VFR BLD AUTO: 42.7 % (ref 36.5–49.3)
HGB BLD-MCNC: 14.6 G/DL (ref 12–17)
MCH RBC QN AUTO: 29.9 PG (ref 26.8–34.3)
MCHC RBC AUTO-ENTMCNC: 34.2 G/DL (ref 31.4–37.4)
MCV RBC AUTO: 87 FL (ref 82–98)
P AXIS: -13 DEGREES
PLATELET # BLD AUTO: 212 THOUSANDS/UL (ref 149–390)
PMV BLD AUTO: 10.9 FL (ref 8.9–12.7)
POTASSIUM SERPL-SCNC: 4.1 MMOL/L (ref 3.5–5.3)
PR INTERVAL: 268 MS
QRS AXIS: 0 DEGREES
QRSD INTERVAL: 86 MS
QT INTERVAL: 430 MS
QTC INTERVAL: 430 MS
RBC # BLD AUTO: 4.89 MILLION/UL (ref 3.88–5.62)
SODIUM SERPL-SCNC: 138 MMOL/L (ref 135–147)
T WAVE AXIS: 1 DEGREES
VENTRICULAR RATE: 60 BPM
WBC # BLD AUTO: 8.33 THOUSAND/UL (ref 4.31–10.16)

## 2022-08-04 PROCEDURE — C1892 INTRO/SHEATH,FIXED,PEEL-AWAY: HCPCS | Performed by: INTERNAL MEDICINE

## 2022-08-04 PROCEDURE — 99152 MOD SED SAME PHYS/QHP 5/>YRS: CPT | Performed by: INTERNAL MEDICINE

## 2022-08-04 PROCEDURE — 80048 BASIC METABOLIC PNL TOTAL CA: CPT | Performed by: INTERNAL MEDICINE

## 2022-08-04 PROCEDURE — 93005 ELECTROCARDIOGRAM TRACING: CPT

## 2022-08-04 PROCEDURE — 93010 ELECTROCARDIOGRAM REPORT: CPT | Performed by: INTERNAL MEDICINE

## 2022-08-04 PROCEDURE — 71045 X-RAY EXAM CHEST 1 VIEW: CPT

## 2022-08-04 PROCEDURE — 99153 MOD SED SAME PHYS/QHP EA: CPT | Performed by: INTERNAL MEDICINE

## 2022-08-04 PROCEDURE — C1769 GUIDE WIRE: HCPCS | Performed by: INTERNAL MEDICINE

## 2022-08-04 PROCEDURE — 33208 INSRT HEART PM ATRIAL & VENT: CPT | Performed by: INTERNAL MEDICINE

## 2022-08-04 PROCEDURE — C1898 LEAD, PMKR, OTHER THAN TRANS: HCPCS | Performed by: INTERNAL MEDICINE

## 2022-08-04 PROCEDURE — 85027 COMPLETE CBC AUTOMATED: CPT | Performed by: INTERNAL MEDICINE

## 2022-08-04 PROCEDURE — C1785 PMKR, DUAL, RATE-RESP: HCPCS | Performed by: INTERNAL MEDICINE

## 2022-08-04 DEVICE — LEAD 5076-52 MRI US RCMCRD
Type: IMPLANTABLE DEVICE | Status: FUNCTIONAL
Brand: CAPSUREFIX NOVUS MRI™ SURESCAN®

## 2022-08-04 DEVICE — LEAD 383069 MRI US
Type: IMPLANTABLE DEVICE | Status: FUNCTIONAL
Brand: SELECTSECURE™ MRI SURESCAN™

## 2022-08-04 DEVICE — IPG W1DR01 AZURE XT DR MRI USA
Type: IMPLANTABLE DEVICE | Status: FUNCTIONAL
Brand: AZURE™ XT DR MRI SURESCAN™

## 2022-08-04 DEVICE — IMPLANTABLE DEVICE: Type: IMPLANTABLE DEVICE | Status: FUNCTIONAL

## 2022-08-04 DEVICE — ENVELOPE CMRM6122 ABSORB MED MR
Type: IMPLANTABLE DEVICE | Status: FUNCTIONAL
Brand: TYRX™

## 2022-08-04 RX ORDER — CEFAZOLIN SODIUM 2 G/50ML
2000 SOLUTION INTRAVENOUS EVERY 8 HOURS
Status: COMPLETED | OUTPATIENT
Start: 2022-08-04 | End: 2022-08-05

## 2022-08-04 RX ORDER — CLOPIDOGREL BISULFATE 75 MG/1
75 TABLET ORAL DAILY
Status: DISCONTINUED | OUTPATIENT
Start: 2022-08-04 | End: 2022-08-05 | Stop reason: HOSPADM

## 2022-08-04 RX ORDER — MIDAZOLAM HYDROCHLORIDE 2 MG/2ML
INJECTION, SOLUTION INTRAMUSCULAR; INTRAVENOUS AS NEEDED
Status: DISCONTINUED | OUTPATIENT
Start: 2022-08-04 | End: 2022-08-04 | Stop reason: HOSPADM

## 2022-08-04 RX ORDER — ATORVASTATIN CALCIUM 40 MG/1
40 TABLET, FILM COATED ORAL
Refills: 1 | Status: DISCONTINUED | OUTPATIENT
Start: 2022-08-04 | End: 2022-08-05 | Stop reason: HOSPADM

## 2022-08-04 RX ORDER — ASPIRIN 81 MG/1
81 TABLET ORAL DAILY
Status: DISCONTINUED | OUTPATIENT
Start: 2022-08-04 | End: 2022-08-05 | Stop reason: HOSPADM

## 2022-08-04 RX ORDER — ASPIRIN 81 MG/1
324 TABLET, CHEWABLE ORAL ONCE
Status: DISCONTINUED | OUTPATIENT
Start: 2022-08-04 | End: 2022-08-05 | Stop reason: HOSPADM

## 2022-08-04 RX ORDER — FENTANYL CITRATE 50 UG/ML
INJECTION, SOLUTION INTRAMUSCULAR; INTRAVENOUS AS NEEDED
Status: DISCONTINUED | OUTPATIENT
Start: 2022-08-04 | End: 2022-08-04 | Stop reason: HOSPADM

## 2022-08-04 RX ORDER — CEFAZOLIN SODIUM 1 G/50ML
SOLUTION INTRAVENOUS
Status: COMPLETED | OUTPATIENT
Start: 2022-08-04 | End: 2022-08-04

## 2022-08-04 RX ORDER — ACETAMINOPHEN 325 MG/1
650 TABLET ORAL EVERY 4 HOURS PRN
Status: DISCONTINUED | OUTPATIENT
Start: 2022-08-04 | End: 2022-08-05 | Stop reason: HOSPADM

## 2022-08-04 RX ADMIN — ACETAMINOPHEN 650 MG: 325 TABLET, FILM COATED ORAL at 15:48

## 2022-08-04 RX ADMIN — CLOPIDOGREL BISULFATE 75 MG: 75 TABLET ORAL at 13:00

## 2022-08-04 RX ADMIN — ATORVASTATIN CALCIUM 40 MG: 40 TABLET, FILM COATED ORAL at 15:48

## 2022-08-04 RX ADMIN — ASPIRIN 81 MG: 81 TABLET, COATED ORAL at 13:00

## 2022-08-04 RX ADMIN — CEFAZOLIN SODIUM 2000 MG: 2 SOLUTION INTRAVENOUS at 17:20

## 2022-08-04 NOTE — Clinical Note
The site was marked  Prepped: chest  Prepped with: ChloraPrep  The site was clipped  The patient was draped

## 2022-08-04 NOTE — INTERVAL H&P NOTE
Update: (This section must be completed if the H&P was completed greater than 24 hrs to procedure or admission)      Patient re-evaluated  Accept as history and physical   Patient is admitted for sick sinus syndrome  He has periods of junctional rhythm    He will be scheduled for dual-chamber pacemaker all risks benefit alternate complication discussed with him wishes to proceed consent was obtained    Jesus Canela MD/August 4, 2022/11:12 AM

## 2022-08-04 NOTE — CASE MANAGEMENT
Case Management Assessment & Discharge Planning Note    Patient name Griselda Brace  Location [address] [place] [address]/[address] [place] [address] [place]-* MRN [id number]  : 1965 Date 2022       Current Admission Date: 2022  Current Admission Diagnosis:Bradycardia   Patient Active Problem List    Diagnosis Date Noted    S/P coronary artery stent placement 2022    Coronary artery disease involving native coronary artery 2022    Screen for colon cancer 2022    1st degree AV block 2022    Bradycardia 2022      LOS (days): 0  Geometric Mean LOS (GMLOS) (days):   Days to GMLOS:     OBJECTIVE:        Current admission status: Outpatient Surgery  Referral Reason: Other (Discharge planning)    Preferred Pharmacy:   [facility] DR GERARDO ROSALES [facility]  Phone: [phone] Fax: [phone]    Primary Care Provider: Patti Casas DO    Primary Insurance: Lou [employer]  Secondary Insurance:     ASSESSMENT:  Jeanine 26 Proxies    There are no active Health Care Proxies on file          Readmission Root Cause  30 Day Readmission: No    Patient Information  Admitted from[de-identified] Home  Mental Status: Alert  During Assessment patient was accompanied by: Spouse, Sister  Assessment information provided by[de-identified] Patient  Primary Caregiver: Self  Support Systems: Spouse/significant other, Family members  [place] of Residence: [address] [place] [address] do you live in?: [address] [place] [address]  In the last 12 months, was there a time when you were not able to pay the mortgage or rent on time?: No  In the last 12 months, how many places have you lived?: 1  In the last 12 months, was there a time when you did not have a steady place to sleep or slept in a shelter (including now)?: No  Homeless/housing insecurity resource given?: N/A  Living Arrangements: Lives w/ Spouse/significant other    Activities of Daily Living Prior to Admission  Functional Status: Independent  Completes ADLs independently?: Yes  Ambulates independently?: Yes  Does patient use assisted devices?: No  Does patient currently own DME?: No  Does patient have a history of Outpatient Therapy (PT/OT)?: Yes  Does the patient have a history of Short-Term Rehab?: No  Does patient have a history of HHC?: No  Does patient currently have [address]?: No     Patient Information Continued  Does patient have prescription coverage?: Yes  Within the past 12 months, you worried that your food would run out before you got the money to buy more : Never true  Within the past 12 months, the food you bought just didn't last and you didn't have money to get more : Never true  Food insecurity resource given?: N/A  Does patient receive dialysis treatments?: No  Does patient have a history of substance abuse?: No  Does patient have a history of Mental Health Diagnosis?: No     Means of Transportation  Means of Transport to Appts[de-identified] Drives Self  In the past 12 months, has lack of transportation kept you from medical appointments or from getting medications?: No  In the past 12 months, has lack of transportation kept you from meetings, work, or from getting things needed for daily living?: No  Was application for public transport provided?: N/A    DISCHARGE DETAILS:    Discharge planning discussed with[de-identified] Patient and wife  Freedom of Choice: Yes  Comments - Freedom of Choice: FOC reviewed with patient  Patient aware that preference will be considered if referrals are indicated  CM contacted family/caregiver?: Yes     Contacts  Patient Contacts: Kassidy Akhtar (wife)  Relationship to Patient[de-identified] Family  Contact Method:  In Person  Reason/Outcome: Emergency Contact, Discharge [address]         Is the patient interested in [address] at discharge?: No    DME Referral Provided  Referral made for DME?: No    Other Referral/Resources/Interventions Provided:  Interventions: None Indicated    Would you like to participate in our [facility] service program?  : No - Declined    Treatment Team Recommendation: Home  Discharge Destination Plan[de-identified] Home  Transport at Discharge : Family      SW spoke with patient at bedside to introduce role of CM and conduct brief screening  Per chart review and discussion with patient, patient is reported to be independent with no apparent discharge needs at this time  SW will continue to follow for any emerging needs

## 2022-08-04 NOTE — Clinical Note
The Jayashree Cisse device was inserted  The leads were placed into the connector and visually verified to be in correct position  Injury current obtained

## 2022-08-04 NOTE — DISCHARGE INSTRUCTIONS
Permanent Pacemaker Discharge Instructions    -  DO NOT raise your affected arm over your head for 4 to 6 weeks      -  TAKE Aquacel dressing off of incision site in 2-3 days, beneath dressing will be Steri-Strips over the incision, these will dry up in fall off on their own  -  DO NOT drive until seen by the Cardiologist for site visit      -  DO NOT lift, pull or push anything over 10-15 pounds for at least 3-4 weeks      -  DO NOT manipulate or put pressure over the pacemaker site      -  Avoid wearing clothing that must be pulled over your head for 4-6 weeks    Incision/Site Care    -  Check the incision daily  Expect to see a scab forming over the incision and the skin color returning to normal   Notify your doctor immediately if your incision suddenly becomes red, swollen, hot to touch or develops any kind of drainage or bleeding       -  monitor for fever greater than 101 and for any of the above call the MD      -  checked incision daily  Expect to see some bruising and swelling  These will go away within several weeks  Call cardiologist if you note this worsening       -  keep the incision clean and dry  You may place a sterile gauze pad over the incision and change it daily  -  do not put on any medication over the incision site unless you have been given specific instructions by the physician  -  over your incision are Steri (paper) strips, leave these in place and they will curl up and fall off on their own as you heal         -  try to expose the incision to air at least for several hours per day  -  gently wash around pacemaker and incision area with plain water and a clean washcloth daily  Gently towel dry the incision area

## 2022-08-05 ENCOUNTER — APPOINTMENT (OUTPATIENT)
Dept: RADIOLOGY | Facility: HOSPITAL | Age: 57
End: 2022-08-05
Payer: COMMERCIAL

## 2022-08-05 VITALS
HEIGHT: 70 IN | BODY MASS INDEX: 37.5 KG/M2 | TEMPERATURE: 97.9 F | OXYGEN SATURATION: 96 % | RESPIRATION RATE: 16 BRPM | DIASTOLIC BLOOD PRESSURE: 76 MMHG | SYSTOLIC BLOOD PRESSURE: 133 MMHG | WEIGHT: 261.91 LBS | HEART RATE: 60 BPM

## 2022-08-05 LAB
ANION GAP SERPL CALCULATED.3IONS-SCNC: 4 MMOL/L (ref 4–13)
BUN SERPL-MCNC: 13 MG/DL (ref 5–25)
CALCIUM SERPL-MCNC: 8.6 MG/DL (ref 8.3–10.1)
CHLORIDE SERPL-SCNC: 100 MMOL/L (ref 96–108)
CO2 SERPL-SCNC: 28 MMOL/L (ref 21–32)
CREAT SERPL-MCNC: 1.23 MG/DL (ref 0.6–1.3)
ERYTHROCYTE [DISTWIDTH] IN BLOOD BY AUTOMATED COUNT: 12.8 % (ref 11.6–15.1)
GFR SERPL CREATININE-BSD FRML MDRD: 63 ML/MIN/1.73SQ M
GLUCOSE SERPL-MCNC: 110 MG/DL (ref 65–140)
HCT VFR BLD AUTO: 40.8 % (ref 36.5–49.3)
HGB BLD-MCNC: 14 G/DL (ref 12–17)
MCH RBC QN AUTO: 30.1 PG (ref 26.8–34.3)
MCHC RBC AUTO-ENTMCNC: 34.3 G/DL (ref 31.4–37.4)
MCV RBC AUTO: 88 FL (ref 82–98)
PLATELET # BLD AUTO: 171 THOUSANDS/UL (ref 149–390)
PMV BLD AUTO: 10.9 FL (ref 8.9–12.7)
POTASSIUM SERPL-SCNC: 3.7 MMOL/L (ref 3.5–5.3)
RBC # BLD AUTO: 4.65 MILLION/UL (ref 3.88–5.62)
SODIUM SERPL-SCNC: 132 MMOL/L (ref 135–147)
WBC # BLD AUTO: 8.43 THOUSAND/UL (ref 4.31–10.16)

## 2022-08-05 PROCEDURE — 71046 X-RAY EXAM CHEST 2 VIEWS: CPT

## 2022-08-05 PROCEDURE — 85027 COMPLETE CBC AUTOMATED: CPT | Performed by: NURSE PRACTITIONER

## 2022-08-05 PROCEDURE — 80048 BASIC METABOLIC PNL TOTAL CA: CPT | Performed by: NURSE PRACTITIONER

## 2022-08-05 PROCEDURE — NC001 PR NO CHARGE: Performed by: NURSE PRACTITIONER

## 2022-08-05 RX ADMIN — ASPIRIN 81 MG: 81 TABLET, COATED ORAL at 09:10

## 2022-08-05 RX ADMIN — CLOPIDOGREL BISULFATE 75 MG: 75 TABLET ORAL at 09:10

## 2022-08-05 RX ADMIN — ACETAMINOPHEN 650 MG: 325 TABLET, FILM COATED ORAL at 10:47

## 2022-08-05 RX ADMIN — CEFAZOLIN SODIUM 2000 MG: 2 SOLUTION INTRAVENOUS at 09:10

## 2022-08-05 RX ADMIN — CEFAZOLIN SODIUM 2000 MG: 2 SOLUTION INTRAVENOUS at 02:22

## 2022-08-05 RX ADMIN — ACETAMINOPHEN 650 MG: 325 TABLET, FILM COATED ORAL at 02:21

## 2022-08-05 NOTE — DISCHARGE SUMMARY
Discharge Summary - Su Jeffery 59 y o  male MRN: [id number]    Unit/Bed#: 4 [place] 414-01 Encounter: [id number]    Admission Date:     Admitting Diagnosis: Bradycardia [R00 1]  Exertional shortness of breath [R06 02]  Dyslipidemia [E78 5]  Coronary artery disease involving native coronary artery of native heart without angina pectoris [I25 10]  Palpitations [R00 2]  S/P coronary artery stent placement [Z95 5]    HPI: Su Jeffery is a 59y o  year old male who was admitted for implantation permanent pacemaker due to symptomatic bradycardia  Procedures Performed:   Orders Placed This Encounter   Procedures    Cardiac ep lab eps/ablations       Summary of Hospital Course:  Patient electively admitted for implantation of Medtronic dual chamber pacemaker on 08/04/2022  He was recovered overnight  He received 3 doses of IV antibiotics (Ancef 2 g) and postprocedure chest x-rays were without pneumothorax  Significant Findings, Care, Treatment and Services Provided:  Implantation of Medtronic dual chamber pacemaker    Complications:  None    Discharge Diagnosis:  Bradycardia, coronary artery disease, implantation Medtronic dual chamber pacemaker    Medical Problems             Resolved Problems  Date Reviewed: 8/4/2022   None                 Condition at Discharge: stable         Discharge instructions/Information to patient and family:   See after visit summary for information provided to patient and family  Provisions for Follow-Up Care:  See after visit summary for information related to follow-up care and any pertinent home health orders  PCP: Sridevi Valenzuela DO    Disposition: See After Visit Summary for discharge disposition information  Planned Readmission: No      Discharge Statement   I spent 60 minutes discharging the patient  This time was spent on the day of discharge  I had direct contact with the patient on the day of discharge   Additional documentation is required if more than 30 minutes were spent on discharge  Discharge Medications:  See after visit summary for reconciled discharge medications provided to patient and family         Antionette Murphy, [address]  [facility]

## 2022-08-05 NOTE — PROGRESS NOTES
Progress Note - Cardiology   [facility]     Jose Antonio Lechuga 59 y o  male MRN: [id number]  : 1965  Unit/Bed#: [address] [place] [address] Encounter: [id number]    Assessment and Plan:   1  Symptomatic bradycardia:  Patient underwent implantation of dual chamber Medtronic pacemaker on 2022 for history of symptomatic bradycardia    -  postprocedure chest x-rays without pneumothorax    -  patient for last dose of antibiotics at 10:00 a m     -  reviewed with patient postop pacemaker discharge instructions and he verbalizes understanding  2  Coronary artery disease with recent PCI:  Stenting to 80-85% RCA stenosis on 2022    -  continue dual anti-platelet therapy    -  beta-blocker held due to bradycardia,    -  continue statin therapy    -  follow-up next week with Dr Hector Peter as scheduled    3  Dyslipidemia:  Continue statin therapy    4  Obesity:  Encourage weight loss  Subjective / Objective:   Patient seen and examined  No complaints offered at this time  Notes some surgical tenderness which was relieved with Tylenol  Pressure dressing removed and site is stable  Reviewed postprocedure postop pacemaker discharge instructions with patient and he verbalizes understanding  Instructions also placed in AVS   Vitals: Blood pressure 133/76, pulse 60, temperature 97 9 °F (36 6 °C), resp  rate 16, height 5' 10" (1 778 m), weight 119 kg (261 lb 14 5 oz), SpO2 96 %  Vitals:    22 1114 22 0547   Weight: 119 kg (262 lb 5 6 oz) 119 kg (261 lb 14 5 oz)     Body mass index is 37 58 kg/m²  BP Readings from Last 3 Encounters:   22 133/76   22 130/80   22 127/65     Orthostatic Blood Pressures    Flowsheet Row Most Recent Value   Blood Pressure 133/76 filed at 2022 0732        I/O        0701   07 0701   07 07 0700    P  O   900     I V  (mL/kg)  20 (0 2)     Total Intake(mL/kg)  920 (7 7)     Urine (mL/kg/hr)  600 Blood  10     Total Output  610     Net  +310                Invasive Devices  Report    Peripheral Intravenous Line  Duration           Peripheral IV 08/04/22 Left Antecubital 1 day                  Intake/Output Summary (Last 24 hours) at 8/5/2022 0826  Last data filed at 8/4/2022 1720  Gross per 24 hour   Intake 920 ml   Output 610 ml   Net 310 ml         Physical Exam:   Physical Exam  Vitals and nursing note reviewed  Constitutional:       General: He is not in acute distress  Appearance: Normal appearance  He is obese  HENT:      Right Ear: External ear normal       Left Ear: External ear normal       Nose: Nose normal    Eyes:      General: No scleral icterus  Right eye: No discharge  Left eye: No discharge  Cardiovascular:      Rate and Rhythm: Normal rate and regular rhythm  Pulses: Normal pulses  Heart sounds: Normal heart sounds  No murmur heard  Comments: Pressure dressing removed from left anterior chest wall, aqua je dressing beneath intact, as 2 small areas of dried drainage, no fresh bleeding or hematoma  Pulmonary:      Effort: Pulmonary effort is normal  No respiratory distress  Breath sounds: Normal breath sounds  No wheezing, rhonchi or rales  Abdominal:      General: Bowel sounds are normal  There is no distension  Palpations: Abdomen is soft  Musculoskeletal:      Right lower leg: No edema  Left lower leg: No edema  Skin:     General: Skin is warm and dry  Capillary Refill: Capillary refill takes less than 2 seconds  Neurological:      General: No focal deficit present  Mental Status: He is alert and oriented to person, place, and time  Mental status is at baseline     Psychiatric:         Mood and Affect: Mood normal                  Medications/ Allergies:     Current Facility-Administered Medications   Medication Dose Route Frequency Provider Last Rate    acetaminophen  650 mg Oral Q4H PRN DRU Robison      aspirin  81 mg Oral Daily DRU Machuca      aspirin  324 mg Oral Once Lisa Carrero MD      atorvastatin  40 mg Oral Daily With DRU Angulo      cefazolin  2,000 mg Intravenous Q8H DRU Machuca 2,000 mg (08/05/22 0222)    clopidogrel  75 mg Oral Daily DRU Machuca       acetaminophen, 650 mg, Q4H PRN      No Known Allergies    VTE Pharmacologic Prophylaxis:   Sequential compression device (Venodyne)     Labs:   CBC with diff:  Results from last 7 days   Lab Units 08/05/22  0610 08/04/22  0820   WBC Thousand/uL 8 43 8 33   HEMOGLOBIN g/dL 14 0 14 6   HEMATOCRIT % 40 8 42 7   MCV fL 88 87   PLATELETS Thousands/uL 171 212   MCH pg 30 1 29 9   MCHC g/dL 34 3 34 2   RDW % 12 8 12 7   MPV fL 10 9 10 9     CMP:  Results from last 7 days   Lab Units 08/05/22  0610 08/04/22  0820   SODIUM mmol/L 132* 138   POTASSIUM mmol/L 3 7 4 1   CHLORIDE mmol/L 100 102   CO2 mmol/L 28 26   ANION GAP mmol/L 4 10   BUN mg/dL 13 15   CREATININE mg/dL 1 23 1 32*   GLUCOSE FASTING mg/dL  --  111*   CALCIUM mg/dL 8 6 8 7   EGFR ml/min/1 73sq m 63 58       Imaging & Testing   I have personally reviewed pertinent reports  XR chest portable    Result Date: 8/4/2022  Narrative: CHEST INDICATION:   Patient s/p Pacemaker/ICD Insertion  COMPARISON:  None EXAM PERFORMED/VIEWS:  XR CHEST PORTABLE  AP semierect FINDINGS:  Dual lead cardiac pacer on the left  Leads appear intact and contiguous on one view  Cardiomediastinal silhouette appears unremarkable  The lungs are clear  No pneumothorax or pleural effusion  No acute osseous abnormalities  Metallic anchors in the right humeral head consistent with prior rotator cuff surgery  Impression: No acute cardiopulmonary disease  Workstation performed: [id number]     Cardiac catheterization    Result Date: 7/21/2022  Narrative: · Mid RCA lesion is 99% stenosed  Which was successfully stented with drug-eluting stent decreasing stenosis to 0 no complication   · LPAV lesion is 100% stenosed  This is a chronic total occlusion  We tried to pass the wire  Wire would not cross easily  Hence it will be managed with medical therapy  It is small to medium size vessel · Mid LAD lesion is 35% stenosed  · The angioplasty was pre-stent angioplasty  · Patient's LVEDP was upper normal there was no gradient across aortic valve  Cardiac ep lab eps/ablations    Result Date: 8/4/2022  Narrative: Cardiac Pacemaker Placement Operative Report Estela Flowers [id number] 8/4/2022 @PCP@ Surgeon: Boris Ullao MD Procedure(s): Dual Chamber Permanent Pacemaker Implantation; Cardiac Fluoroscopy Indications:  Sick sinus syndrome, symptomatic bradycardia Description of procedure: The risks, benefits, complications,  alternative treatment options, and expected outcomes were discussed with the patient  The complications of infection, pneumothorax, cardiac perforation, cardiac tamponade, and even death, but not limited to it were discussed with patient the family, before the patient was brought to cath lab  Patient and family were explained about lead dislodgement and need for repeat procedures  The patient and/or family concurred with the proposed plan, giving informed consent  Patient was prepped and draped in the usual strict sterile fashion  After the antibiotic was completely infused, 20 cc Sensorcaine was infiltrated into the area just medial to the left deltopectoral groove  Access was obtained in left subclavian vein with modified Seldinger technique with ultrasound guidance and a guidewire was retained  Sheath was placed over this guidewire and a 2nd guidewire was was passed  Sheath was removed and the two guidewires  were retained  Then a new sheath was placed over 1 guidewire and a 2nd guidewire was retained  Using a #15 scalpel, an incision was made  The incision was extended to the prepectoral fascia using blunt dissection as well as electrocautery   A small pocket was made and complete hemostasis was achieved  A antibiotic-soaked gauze was placed in the cavity  A 7F sheath passed over first wire  And 2nd wire was retained  Guiding catheter was placed through the sheath over the wire, this is 150 cm J-wire and a His lead was placed through the guiding catheter, once we have made sure that the guiding catheter is in the RV septal area close to his bundle  Position of the lead was confirmed in both JUÁREZ and LEXIE views Appropriate sensing and thresholds were noted  Lead was screwed in LPF position with non selective capture  The sheath was slit and removed  Once again the lead was tested for appropriate sensing, impedance and threshold  The lead was tied down to the prepectoral fascia and muscle  The patient was paced from the LPF  for second part of procedure  Thresholds were tested multiple times    Appropriate sensing and thresholds were obtained  No diaphragmatic pacing occurred at 10 V and 1 5 ms  A second sheath was advanced over the second  guidewire  The dilator and guidewire were removed  A pacemaker lead was advanced to the heart under fluoroscopic guidance  The sheath was peeled away  The lead was fixated to the right atrial appendage  Appropriate sensing and thresholds were obtained  No diaphragmatic pacing occurred at 10 V and 1 5 ms  The leads were then sutured to the pectoralis muscle using silk sutures  A second suture was placed around the lead sleeves  The leads were attached to the generator  The system was placed in the pocket in the antibiotic pouch  Pocket was washed with large amount of antibiotic saline  And hemostasis was achieved  The pacemaker and leads system were visualized under fluoroscopy  Appropriate redundancy/slack in the leads were noted  The pins of the leads were beyond the set screws  Hemostasis was reverified  The pocket was then closed with 2 running layers of the dissolvable sutures    Steri-Strips, a gauze dressing, and an history pressure dressing was applied at operative site  Thereafter the device was interrogated and proper sensing and thresholds through the device were confirmed  Pacemaker Data: Pacemaker is Medtronic dual-chamber with septal pacing Measured Data: Estimated Blood Loss:  Less than 5 cc      Complications: No complications Disposition: Patient was transferred to holding area in stable condition  Condition: Stable Impression: Successful placement of dual chamber pacemaker without any complications  Plan see as ordered  Dr Amy Cloud MD [facility] "This note has been constructed using a voice recognition system  Therefore there may be syntax, spelling, and/or grammatical errors  Please call if you have any questions  "        EKG / Monitor: Personally reviewed  Atrial pacing with appropriate capture device interrogated this morning and functioning per parameters      Leena Blank [facility]  Cardiology      "This note has been constructed using a voice recognition system  Therefore there may be syntax, spelling, and/or grammatical errors   Please call if you have any questions  "

## 2022-08-11 PROBLEM — Z95.0 STATUS POST PLACEMENT OF CARDIAC PACEMAKER: Status: ACTIVE | Noted: 2022-08-11

## 2022-08-12 ENCOUNTER — OFFICE VISIT (OUTPATIENT)
Dept: CARDIOLOGY CLINIC | Facility: CLINIC | Age: 57
End: 2022-08-12
Payer: COMMERCIAL

## 2022-08-12 VITALS
TEMPERATURE: 97 F | HEART RATE: 72 BPM | SYSTOLIC BLOOD PRESSURE: 120 MMHG | OXYGEN SATURATION: 97 % | DIASTOLIC BLOOD PRESSURE: 97 MMHG | BODY MASS INDEX: 37.94 KG/M2 | WEIGHT: 265 LBS | HEIGHT: 70 IN

## 2022-08-12 DIAGNOSIS — Z95.5 S/P CORONARY ARTERY STENT PLACEMENT: ICD-10-CM

## 2022-08-12 DIAGNOSIS — I25.10 CORONARY ARTERY DISEASE INVOLVING NATIVE CORONARY ARTERY OF NATIVE HEART WITHOUT ANGINA PECTORIS: ICD-10-CM

## 2022-08-12 DIAGNOSIS — Z95.0 STATUS POST PLACEMENT OF CARDIAC PACEMAKER: ICD-10-CM

## 2022-08-12 DIAGNOSIS — E78.5 DYSLIPIDEMIA: ICD-10-CM

## 2022-08-12 DIAGNOSIS — R06.02 EXERTIONAL SHORTNESS OF BREATH: ICD-10-CM

## 2022-08-12 PROCEDURE — 93000 ELECTROCARDIOGRAM COMPLETE: CPT | Performed by: INTERNAL MEDICINE

## 2022-08-12 PROCEDURE — 99214 OFFICE O/P EST MOD 30 MIN: CPT | Performed by: INTERNAL MEDICINE

## 2022-08-12 RX ORDER — ROSUVASTATIN CALCIUM 20 MG/1
20 TABLET, COATED ORAL DAILY
Qty: 90 TABLET | Refills: 1 | Status: SHIPPED | OUTPATIENT
Start: 2022-08-12

## 2022-08-12 NOTE — PROGRESS NOTES
Progress note - Cardiology Office   [facility]  Eliseo Henriquez 59 y o  male MRN: [id number]  : 1965  Unit/Bed#:  Encounter: [id number]      Assessment:     1  Exertional shortness of breath    2  Dyslipidemia    3  Coronary artery disease involving native coronary artery of native heart without angina pectoris    4  S/P coronary artery stent placement    5  Sick sinus syndrome status post dual-chamber pacemaker of [address] [place] [address] with septal pacing        Discussion summary and Plan:    1  Exertional shortness of breath  Patient exertional shortness of breath is no longer present  He had a successful angioplasty of his codominant RCA and has distal circ 100% occluded which is chronically occluded continue medical Rx  Nonobstructive disease of LAD noted    2  Sick sinus syndrome with frequent PACs  Status post pacemaker of [address] [place] [address]  No longer shortness of breath he does not feel PACs he is 100% atrial paced at this time    3  Dyslipidemia  His risk for cardiovascular event is high  He has a nonobstructive coronary artery disease as well as obstructive coronary artery disease will increase Crestor to 20 mg daily  Continue same medication    4  Coronary artery disease status post angioplasty of codominant RCA and has 100% occluded distal circumflex which will be managed with medical therapy continue dual platelet therapy  Continue dual platelet therapy    5  Obesity with BMI around 38 encouraged him to lose weight    6  History of marijuana use   As per patient has quit smoking  7  Possible sleep apnea  At some point he will need a sleep study    Continue current Rx  Patient was advised and educated to call our office  immediately if  patient has any new symptoms of chest pain/shortness of breath, near-syncope, syncope, light headedness sustained palpitations  or any other cardiovascular symptoms before their scheduled follow-up appointment    Office #[phone]  Thank you for your consultation  If you have any question please call me at [phone]    Counseling :  A description of the counseling  Goals and Barriers  Patient's ability to self care: Yes  Medication side effect reviewed with patient in detail and all their questions answered to their satisfaction  Primary Care Physician : Lion Smith DO      HPI :     Amber Salmeron is a 59y o  year old male who was referred by primary care doctor for exertional shortness of breath and palpitations  Patient who has medical history significant for bradycardia, obesity with BMI around 44 has noted lately he is having exertional shortness of breath and then racing of his heart  He had gained about 10-15 lb in the last year and since then he has noted when he climbs stairs or he walks he gets short of breath and occasionally get palpitations  He was noted to have first-degree AV block as well as episodes of bradycardia today also his heart rate is 56 beats per minute he has sinus arrhythmia  And first-degree AV block EKG also shows T-wave inversions and ST flattening in inferior leads along with Q-waves in inferior leads  He denies any personal history of MI in the past   There is no family history of premature coronary artery disease but his mother did have pacemaker  Other medical history significant for history of kidney stones  He never smoked but lately he started using marijuana mostly in the evening for some time to sleep  No nausea no vomiting no fever no chills no leg edema  Past surgical history  Multiple orthopedic surgery including shoulder surgery and biceps surgery  06/29/2022  Above reviewed  Patient was initially seen by us for exertion shortness of breath and palpitations  He also had history of bradycardia  Holter monitor shows his average heart rate was 49 beats per minute and he has frequent PACs defer 7 6% of his total beats    He underwent nuclear stress test   He tried walking on the treadmill he did walk for 8 minutes and he could only achieve 73% of his maximum predicted heart rate and then he became bradycardic  He did with symptoms of dizziness and lightheadedness and he was in junctional bradycardia and test was changed to pharmacological test   Which shows he had a EKG changes as well as ischemia  Holter monitor shows patient has bradycardia for 35 hours per  He admits that he snores at night  He feels he has more easy fatigue and tired but denies any symptoms of dizziness or lightheadedness  He has not passed out  There is a family history of pacemaker his mother also had a pacemaker and his father also had a pacemaker at some point of his life  07/06/2022  Above reviewed  Patient came for follow-up  He was initially seen for exertional shortness of breath and history of bradycardia  His Holter monitor shows he has heart rate average around 49 beats per minute  He has frequent PACs there were 7 6% of his total beats  He feels palpitations he has short atrial runs and we were not able to treat with beta-blocker due to his slow heart rate  While he was in the hospital he was noted to have significant episodes of bradycardia with heart rate dropping to mid 30s  He also feels fatigue and tired and he has junctional bradycardia  His cardiac catheterization shows he had a distal circ disease which is chronic total occlusion and he has codominant RCA successfully stented with drug-eluting stent  His cholesterol was high and he is taking his medications  He used to smoke marijuana he has quit smoking it  He feels fatigue and tired but denies any nausea and vomiting recently he felt couple episode of dizziness  He had a family history of pacemakers  He has no nausea he denies any syncopal episode at this time  He came with his wife     08/12/2022  Above reviewed  Patient came for follow-up he is doing well    Denies any new complaints  He feels lot better since pacemaker  Pacemaker site has healed well  He is currently atrial paced  And ventricular sensed  He feels no exertion shortness of breath  He does have history of coronary artery disease status post angioplasty of RCA and has distal circ which is chronically occluded  He is now on high intensity statin  RCA was codominant artery no nausea no vomiting no other issues  Review of Systems   Constitutional: Negative for activity change, chills, diaphoresis, fever and unexpected weight change  HENT: Negative for congestion  Eyes: Negative for discharge and redness  Respiratory: Negative for cough, chest tightness, shortness of breath and wheezing  Cardiovascular: Negative  Negative for chest pain, palpitations and leg swelling  Gastrointestinal: Negative for abdominal pain, diarrhea and nausea  Endocrine: Negative  Genitourinary: Negative for decreased urine volume and urgency  Musculoskeletal: Negative  Negative for arthralgias, back pain and gait problem  Skin: Negative for rash and wound  Allergic/Immunologic: Negative  Neurological: Negative for dizziness, seizures, syncope, weakness, light-headedness and headaches  Hematological: Negative  Psychiatric/Behavioral: Negative for agitation and confusion  The patient is not nervous/anxious  Historical Information   Past Medical History:   Diagnosis Date    Hyperlipidemia     Irregular heart beat     bradycardia post stress test, holter monitor on until 05/05/22 Dr Nikki Cruz Kidney stone      Past Surgical History:   Procedure Laterality Date    BICEPS TENDON REPAIR Bilateral     CARDIAC CATHETERIZATION N/A 7/20/2022    Procedure: Cardiac Coronary Angiogram;  Surgeon: Negrito Davenport MD;  [facility];   Service: Cardiology    CARDIAC CATHETERIZATION Left 7/20/2022    Procedure: Cardiac Left Heart Cath;  Surgeon: Negrito Davenport MD;  [facility]; Service: Cardiology    CARDIAC CATHETERIZATION N/A 7/20/2022    Procedure: Cardiac pci;  Surgeon: Nancy Maldonado MD;  [facility]; Service: Cardiology    CARDIAC ELECTROPHYSIOLOGY PROCEDURE N/A 8/4/2022    Procedure: Cardiac pacer implant;  Surgeon: Nancy Maldonado MD;  Location: [place] [address] CATH LAB; Service: Cardiology    INGUINAL HERNIA REPAIR      LASIK Bilateral     ROTATOR CUFF REPAIR Right     VASECTOMY       Social History     Substance and Sexual Activity   Alcohol Use Not Currently    Comment: rare     Social History     Substance and Sexual Activity   Drug Use Not Currently    Types: Marijuana     Social History     Tobacco Use   Smoking Status Never Smoker   Smokeless Tobacco Never Used     Family History:   Family History   Problem Relation Age of Onset    Hypertension Mother     Diabetes Mother     Hypertension Father     Colon polyps Father     Mental illness Neg Hx        Meds/Allergies     No Known Allergies    Current Outpatient Medications:     aspirin (ECOTRIN LOW STRENGTH) 81 mg EC tablet, Take 1 tablet (81 mg total) by mouth daily, Disp: 100 tablet, Rfl: 1    clopidogrel (PLAVIX) 75 mg tablet, Take 1 tablet (75 mg total) by mouth daily, Disp: 30 tablet, Rfl: 11    rosuvastatin (CRESTOR) 20 MG tablet, Take 1 tablet (20 mg total) by mouth daily, Disp: 90 tablet, Rfl: 1    Vitals: Blood pressure 120/97, pulse 72, temperature (!) 97 °F (36 1 °C), height 5' 10" (1 778 m), weight 120 kg (265 lb), SpO2 97 %  ?  Body mass index is 38 02 kg/m²  Wt Readings from Last 3 Encounters:   08/12/22 120 kg (265 lb)   08/05/22 119 kg (261 lb 14 5 oz)   07/26/22 119 kg (262 lb)     Vitals:    08/12/22 1351   Weight: 120 kg (265 lb)     BP Readings from Last 3 Encounters:   08/12/22 120/97   08/05/22 133/76   07/26/22 130/80         Physical Exam  Constitutional:       General: He is not in acute distress  Appearance: He is well-developed  He is not diaphoretic  Neck:      Thyroid: No thyromegaly  Vascular: No JVD  Trachea: No tracheal deviation  Cardiovascular:      Rate and Rhythm: Normal rate and regular rhythm  Heart sounds: S1 normal and S2 normal  Heart sounds not distant  Murmur heard  Systolic (ejection) murmur is present with a grade of 2/6  No friction rub  No gallop  No S3 or S4 sounds  Pulmonary:      Effort: Pulmonary effort is normal  No respiratory distress  Breath sounds: Normal breath sounds  No wheezing or rales  Chest:      Chest wall: No tenderness  Abdominal:      General: Bowel sounds are normal  There is no distension  Palpations: Abdomen is soft  Tenderness: There is no abdominal tenderness  Musculoskeletal:         General: No deformity  Cervical back: Neck supple  Skin:     General: Skin is warm and dry  Coloration: Skin is not pale  Findings: No rash  Neurological:      Mental Status: He is alert and oriented to person, place, and time  Psychiatric:         Behavior: Behavior normal          Judgment: Judgment normal             Diagnostic Studies Review Cardio:    Echo Doppler 05/03/2022 shows normal LV systolic function EF 93-32%, borderline LV thickness  No significant valvular disease  Nuclear stress test   Nuclear stress test shows small reversible ischemia in the inferior wall with sum score of 5  Patient did have some junctional rhythm during recovery along with bradycardia  EKG shows 1 mm ST depression which was upsloping and horizontal   EF was 64%  Holter monitor shows patient has average heart rate is 49 beats per minute  Maximum heart rate was 114 beats per minute  Patient has frequent PACs there were 7 6% of the total beats  Patient was in bradycardia for 35 hours  He has a 2 9 seconds pause during sleep hours  EKG:    Twelve lead EKG done on 04/07/2022 shows sinus bradycardia heart rate 56 beats per minute first-degree AV block    Q-wave noted in inferior leads with ST flattening cannot rule out inferior wall infarction    Lead EKG 08/12/2022 shows atrial paced rhythm ventricular sensed  Imaging:  Chest X-Ray:   No Chest XR results available for this patient  CT-scan of the chest:     No CTA results available for this patient  Lab Review     BMP:  Lab Results   Component Value Date    SODIUM 132 (L) 08/05/2022    K 3 7 08/05/2022     08/05/2022    CO2 28 08/05/2022    BUN 13 08/05/2022    CREATININE 1 23 08/05/2022    GLUC 110 08/05/2022    GLUF 111 (H) 08/04/2022    CALCIUM 8 6 08/05/2022    EGFR 63 08/05/2022     Troponins:    LFT:  Lab Results   Component Value Date    AST 20 03/10/2022    ALT 25 03/10/2022    TP 6 8 03/10/2022    ALB 4 5 03/10/2022      Lipid Profile:   Lab Results   Component Value Date    CHOLESTEROL 227 (H) 03/10/2022    HDL 47 03/10/2022    LDLCALC 151 (H) 03/10/2022    TRIG 161 (H) 03/10/2022     Lab Results   Component Value Date    CHOLESTEROL 227 (H) 03/10/2022     The 10-year ASCVD risk score (Ema Mccarthy et al , 2013) is: 8 4%    Values used to calculate the score:      Age: 59 years      Sex: Male      Is Non- : No      Diabetic: No      Tobacco smoker: No      Systolic Blood Pressure: 735 mmHg      Is BP treated: No      HDL Cholesterol: 47 mg/dL      Total Cholesterol: 227 mg/dL      Dr Ajith Vaughan MD [facility]      "This note has been constructed using a voice recognition system  Therefore there may be syntax, spelling, and/or grammatical errors   Please call if you have any questions  "

## 2022-09-12 ENCOUNTER — IN-CLINIC DEVICE VISIT (OUTPATIENT)
Dept: CARDIOLOGY CLINIC | Facility: CLINIC | Age: 57
End: 2022-09-12

## 2022-09-12 DIAGNOSIS — Z95.0 PRESENCE OF PERMANENT CARDIAC PACEMAKER: Primary | ICD-10-CM

## 2022-09-12 PROCEDURE — 99024 POSTOP FOLLOW-UP VISIT: CPT | Performed by: INTERNAL MEDICINE

## 2022-09-12 NOTE — PROGRESS NOTES
MDT DUAL PM/ACTIVE SYSTEM IS MRI CONDITIONAL   DEVICE INTERROGATED IN THE [place] OFFICE:  BATTERY VOLTAGE ADEQUATE (11 3 YR )   AP 86 4%  2 2%    ALL LEAD PARAMETERS WITHIN NORMAL LIMITS   3N FAST A&V EPISODES WITH EGMS SHOWING ST VS SVT @ 158 BPM)   NO PROGRAMMING CHANGES MADE TO DEVICE PARAMETERS   NORMAL DEVICE FUNCTION   RG

## 2022-10-10 ENCOUNTER — TELEPHONE (OUTPATIENT)
Dept: CARDIAC REHAB | Facility: CLINIC | Age: 57
End: 2022-10-10

## 2022-10-11 ENCOUNTER — APPOINTMENT (OUTPATIENT)
Dept: CARDIAC REHAB | Facility: CLINIC | Age: 57
End: 2022-10-11

## 2022-10-11 PROBLEM — Z12.11 SCREEN FOR COLON CANCER: Status: RESOLVED | Noted: 2022-03-24 | Resolved: 2022-10-11

## 2022-10-20 ENCOUNTER — CLINICAL SUPPORT (OUTPATIENT)
Dept: CARDIAC REHAB | Facility: CLINIC | Age: 57
End: 2022-10-20
Payer: COMMERCIAL

## 2022-10-20 DIAGNOSIS — Z95.5 S/P CORONARY ARTERY STENT PLACEMENT: ICD-10-CM

## 2022-10-20 PROCEDURE — 93797 PHYS/QHP OP CAR RHAB WO ECG: CPT

## 2022-10-20 NOTE — PROGRESS NOTES
Cardiac Rehabilitation Plan of Care   Initial Care Plan          Today's date: 10/20/2022   # of Exercise Sessions Completed: eval - 1  Patient name: Sonja Huber      : 1965  Age: 59 y o  MRN: [id number]  Referring Physician: Agnieszka Givens MD  Cardiologist: Agnieszka Givens MD  Provider: Acosta Garnica  Clinician: Michaelle Diop MS, CEP    Dx:   Encounter Diagnosis   Name Primary? • S/P coronary artery stent placement      Date of onset: 2022      SUMMARY OF PROGRESS:  Today is ESTEFANÍA Ellis's initial evaluation to begin Cardiac Rehab post S/P TAVR  The patient does not currently follow a formal exercise program at home  He has resumed all ADLs without limitations  Depression screening using the PHQ-9 interprets the patient's score of  2  as  1-4 = Minimal Depression  DEISY-7 screening tool for anxiety suggests 0  0-4  = Not anxious  When addressed, the patient denies having depression/anxiety  Patient reports excellent social/emotional support  Information to begin using [employer] was provided as well as contact information for counseling through [facility]  PHQ-9 score will be reassessed in 30 days  The patient is a non-smoker  Patient admits to 100% medication compliance  Patient reports the following physical limitations: back pain  The patient completed an initial submaximal TM ETT  The patient completed 3 minutes of stage 1 (2 2METs) with test termination of RHR +30  Resting  /70 with appropriate hemodynamic response to exercise reaching 124/74  Patient denied symptoms during exercise  Telemetry revealed NSR w/ 1' AVB and rare PVCs  Patient was counseled on exercise guidelines to achieve a minimum of 150 mins/wk of moderate intensity (RPE 4-6) exercise and encouraged to add 1-2 days of exercise on opposite days of cardiac rehab as tolerated  We discussed current dietary habits and goals of heart healthy eating for lipid management and weight loss   The patient has N/A  Patient's goals include: Improve functional capacity, improve endurance, improve strength, increase ROM, and get into a regular exercise routine  The patient's CAD risk factors include:  inactivity, obesity/overweight and hyperlipidemia  He education will focus on lifestyle modification/education specific to His needs  Patient will attend group education classes on heart healthy eating, reading food labels, stress management, risk factor reduction, understanding heart disease and common heart medications  Patient will attend 35 monitored exercise sessions, 3x/wk for 12-18 weeks beginning 10/26/2022         Medication compliance: Yes   Comments: Pt reports to be compliant with medications  Fall Risk: Low   Comments: Ambulates with a steady gait with no assist device    EKG Interpretation: NSR w/ 1' AVB and rare PVCs      EXERCISE ASSESSMENT and PLAN    Exercise Prescription:      Frequency: 2-3 days/week   Supplement with home exercise 2+ days/wk as tolerated       Minutes: 30-35         METS: 2 0-3 5            HR:    RPE: 3         Modalities: Treadmill, UBE, Lifecycle, NuStep and Recumbent bike      30 Day Goals for Exercise Progression:    Frequency: 2-3 days/week of cardiac rehab       Supplement with home exercise 2+ days/wk as tolerated    Minutes: 30-40                              >150 mins/wk of moderate intensity exercise   METS: 2 0-4 0   HR: 30 + RHR    RPE: 4-6   Modalities: Treadmill, UBE, Lifecycle, NuStep and Recumbent bike    Strength trainin-3 days / week  12-15 repetitions  1-2 sets per modality   Will be added following 2-3 weeks of monitored exercise sessions   Modalities: Pull Downs, Lateral Raise, [employer], Upright Rows, Front Raises and Shoulder Shrugs    Home Exercise: none    Goals: 10% improvement in functional capacity - based on max METs achieved in fitness assessment, improved DASI score by 10%, Increase in exercise capacity by 40% - based on peak METs tolerated in cardiac rehab exercise session, Exercise 5 days/wk, >150mins/wk of moderate intensity exercise, Attend Rehab regularly, Decrease sitting time, Start a walking program and start a home exercise program    Progression Toward Goals:  Reviewed Pt goals and determined plan of care    Education: benefit of exercise for CAD risk factors, home exercise guidelines, AHA guidelines to achieve >150 mins/wk of moderate exercise and RPE scale   Plan:education on home exercise guidelines, home exercise 30+ mins 2 days opposite CR and Education class: Risk Factors for Heart Disease  Readiness to change: Contemplation:  (Acknowledging that there is a problem but not yet ready or sure of wanting to make a change)      NUTRITION ASSESSMENT AND PLAN    Weight control:    Starting weight: 264 5   Current weight:     Waist circumference:    Startin   Current:      Diabetes: N/A    Lipid management: Discussed diet and lipid management and Last lipid profile 3/10/22  Chol 227    HDL 47      Goals:LDL <100, TRG <150, CHOL <200, Improved Rate Your Plate score  >32, 9 5-1%  wt loss, choose lean meat (93-95%), eliminate processed meats, reduce portion sizes of meat to 3oz or less, increase intake of fish, shellfish, cook without added fat or use vegetable oil/spray, use low fat dairy, reduce cheese intake or use reduced-fat, choose low sodium processed foods, eliminate butter, use fat-free dressings/boyd or seldom use, choose healthy snacks: light popcorn, plain pretzels, Increase intake of nuts and seeds and seldom eat or choose low fat ice-cream, fruit juice bars or frozen yogurt     Progression Toward Goals: Reviewed Pt goals and determined plan of care    Education: heart healthy eating  low sodium diet  hydration  wt  loss   portion control  Plan: Education class: Reading Food Labels, Education Class: Heart Healthy Eating, switch to low fat cheeses, replace butter with soft spreads made with olive oil, canola or yogurt, replace refined grain bread with whole grain bread, replace unhealthy snacks with fruits & vegs, reduce red meat 1x/wk, eat fewer desserts and sweets, avoid processed foods, will try new grains like brown rice, quinoa, farro, reduce alcohol intake, drink more water, learn how to read food labels and keep added daily sugar <25g/day  Readiness to change: Preparation:  (Getting ready to change)       PSYCHOSOCIAL ASSESSMENT AND PLAN    Emotional:  Depression assessment:  PHQ-9 = 2  1-4 = Minimal Depression            Anxiety measure:  DEISY-7 = 0  0-4  = Not anxious  Self-reported stress level:  3  Social support: Very Good    Goals:  improved [facility] QOL < 27, Increased interest in doing things, improved positive thoughts of well being and increased energy    Progression Toward Goals: Reviewed Pt goals and determined plan of care    Education: benefits of a positive support system and stress management techniques  Plan: Class: Stress and Your Health, Class: Relaxation, Exercise, Enjoy a hobby and Keep a positive mindset  Readiness to change: Preparation:  (Getting ready to change)       OTHER CORE COMPONENTS     Tobacco:   Social History     Tobacco Use   Smoking Status Never Smoker   Smokeless Tobacco Never Used       Tobacco Use Intervention:   N/A:  Patient is a non-smoker     Anginal Symptoms:  None   NTG use: No prescription    Blood pressure:    Restin/70   Exercise: 124/74    Goals: consistent BP < 130/80, reduced dietary sodium <2300mg, moderate intensity exercise >150 mins/wk and medication compliance    Progression Toward Goals: Reviewed Pt goals and determined plan of care    Education:  understanding high blood pressure and it's relationship to CAD  Plan: Class: Understanding Heart Disease, Class: Common Heart Medications, Avoid Processed foods, engage in regular exercise and check labels for sodium content  Readiness to change: Contemplation:  (Acknowledging that there is a problem but not yet ready or sure of wanting to make a change)

## 2022-10-20 NOTE — PROGRESS NOTES
CARDIAC REHAB ASSESSMENT    Today's date: 2022  Patient name: Deepti Petit     : 1965       MRN: [id number]  PCP: Suman La DO  Referring Physician: Shelley Richardson MD  Cardiologist: Shelley Richardson MD  Surgeon: Shelley Richardson MD  Dx:   Encounter Diagnosis   Name Primary? • S/P coronary artery stent placement        Date of onset: 2022    Weight    Wt Readings from Last 1 Encounters:   22 120 kg (265 lb)      Height:   Ht Readings from Last 1 Encounters:   22 5' 10" (1 778 m)     Medical History:   Past Medical History:   Diagnosis Date   • Hyperlipidemia    • Irregular heart beat     bradycardia post stress test, holter monitor on until 22 Dr Ivan Davenport   • Kidney stone          Physical Limitations: back pain    Fall Risk: Low   Comments: Ambulates with a steady gait with no assist device    Anginal Equivalent: None/denies angina   NTG use: No prescription    Risk Factors   Cholesterol: Yes  Smoking: Never used  HTN: No  DM: No  Obesity: Yes   Inactivity: Yes  Stress:  perceived  stress: 3/10   Stressors: own business - financial   Goals for Stress Management:Practice Relaxation Techniques, Improve Time Management and Keep a positive mindset    Family History:  Family History   Problem Relation Age of Onset   • Hypertension Mother    • Diabetes Mother    • Hypertension Father    • Colon polyps Father    • Mental illness Neg Hx        Allergies: Patient has no known allergies    ETOH:   Social History     Substance and Sexual Activity   Alcohol Use Not Currently    Comment: rare         Current Medications:   Current Outpatient Medications   Medication Sig Dispense Refill   • aspirin (ECOTRIN LOW STRENGTH) 81 mg EC tablet Take 1 tablet (81 mg total) by mouth daily 100 tablet 1   • clopidogrel (PLAVIX) 75 mg tablet Take 1 tablet (75 mg total) by mouth daily 30 tablet 11   • rosuvastatin (CRESTOR) 20 MG tablet Take 1 tablet (20 mg total) by mouth daily 90 tablet 1 No current facility-administered medications for this visit  Functional Status Prior to Diagnosis for Treatment   Occupation: full time job flip houses  Recreation: none  ADL’s: No limitations  [place]: No limitations  Exercise: none      Current Functional Status  Occupation: unemployed  Recreation: none  ADL’s:No limitations  [place]: No limitations  Exercise: none      Patient Specific Goals:  Improve functional capacity, increase ROM, regular exercise routine    Short Term Program Goals: dietary modifications increased strength improved energy/stamina with ADLs exercise 120-150 mins/wk wt loss 1-2 ppw    Long Term Goals: increased maximal walking duration  Improved [facility] Activity Status score  Improved functional capacity  Improved Quality of Life - [facility] score reduced  Reduced body fat%  Reduced waist circumference  improved Rate Your Plate Score    Ability to reach goals/rehabilitation potential:  Very Good     Projected return to function: 8-12 weeks  Objective tests: sub-max TM ETT      Nutritional   Reviewed details of Rate your Plate  Discussed key elements of heart healthy eating  Reviewed patient goals for dietary modifications and their clinical implications  Reviewed most recent lipid profile  Goals for dietary modification: choose lean cuts of meat  poultry without the skin  low fat ground meat and poultry  eliminate processed meats  reduce portions of meat to 3 oz  increase fish intake  low fat dairy   reduced fat cheese  increase whole grains  increase fruits and vegetables  low sodium  improved snack choices  reduce sweets/frozen desserts      Emotional/Social  Patient reports he/she is coping well with good social support and denies depression or anxiety    Marital status:     Domestic Violence Screening: No    Comments: Palpitations and SOB  Saw cardiologist - bradycardia  Cath showed 2 blockages - 100%, 70%  Stent and pacemaker

## 2022-10-26 ENCOUNTER — CLINICAL SUPPORT (OUTPATIENT)
Dept: CARDIAC REHAB | Facility: CLINIC | Age: 57
End: 2022-10-26
Payer: COMMERCIAL

## 2022-10-26 DIAGNOSIS — Z95.5 S/P CORONARY ARTERY STENT PLACEMENT: Primary | ICD-10-CM

## 2022-10-26 PROCEDURE — 93798 PHYS/QHP OP CAR RHAB W/ECG: CPT

## 2022-10-28 ENCOUNTER — APPOINTMENT (OUTPATIENT)
Dept: CARDIAC REHAB | Facility: CLINIC | Age: 57
End: 2022-10-28

## 2022-11-14 ENCOUNTER — OFFICE VISIT (OUTPATIENT)
Dept: CARDIOLOGY CLINIC | Facility: CLINIC | Age: 57
End: 2022-11-14

## 2022-11-14 VITALS
HEART RATE: 83 BPM | TEMPERATURE: 97 F | HEIGHT: 70 IN | BODY MASS INDEX: 38.51 KG/M2 | OXYGEN SATURATION: 98 % | WEIGHT: 269 LBS | SYSTOLIC BLOOD PRESSURE: 128 MMHG | DIASTOLIC BLOOD PRESSURE: 82 MMHG

## 2022-11-14 DIAGNOSIS — Z95.0 STATUS POST PLACEMENT OF CARDIAC PACEMAKER: ICD-10-CM

## 2022-11-14 DIAGNOSIS — E66.9 OBESITY (BMI 30-39.9): ICD-10-CM

## 2022-11-14 DIAGNOSIS — R06.83 SNORING: ICD-10-CM

## 2022-11-14 DIAGNOSIS — I25.10 CORONARY ARTERY DISEASE INVOLVING NATIVE CORONARY ARTERY OF NATIVE HEART WITHOUT ANGINA PECTORIS: ICD-10-CM

## 2022-11-14 DIAGNOSIS — Z95.5 S/P CORONARY ARTERY STENT PLACEMENT: ICD-10-CM

## 2022-11-14 DIAGNOSIS — E78.5 DYSLIPIDEMIA: ICD-10-CM

## 2022-11-14 DIAGNOSIS — R06.02 EXERTIONAL SHORTNESS OF BREATH: ICD-10-CM

## 2022-11-14 NOTE — PROGRESS NOTES
Progress note - Cardiology Office  [facility]  Tye River 59 y o  male MRN: [id number]  : 1965  Unit/Bed#:  Encounter: [id number]      Assessment:     1  Exertional shortness of breath    2  Coronary artery disease involving native coronary artery of native heart without angina pectoris    3  Dyslipidemia    4  S/P coronary artery stent placement    5  Sick sinus syndrome status post dual-chamber pacemaker of [employer] with septal pacing    6  Obesity (BMI 30-39 9)    7  Snoring        Discussion summary and Plan:    1  Exertional shortness of breath  Patient exertional shortness of breath is no longer present  He had a successful angioplasty of his codominant RCA and has distal circ 100% occluded which is chronically occluded continue medical Rx  Nonobstructive disease of LAD noted   Continue aspirin  2  Sick sinus syndrome with frequent PACs  Status post pacemaker of [address] [place] [address]  No longer shortness of breath  He is pacing 86% of the time  Feeling well  Occasional episode of fast heartbeat  Blood pressure is acceptable    3  Dyslipidemia  Continue statin he is on Crestor 20 mg  Cholesterol profile is acceptable    4  Coronary artery disease status post angioplasty of codominant RCA and has 100% occluded distal circumflex which will be managed with medical therapy continue dual platelet therapy  Continue dual platelet therapy with currently on aspirin and Plavix    5  Obesity with BMI around 38 encouraged him to lose weight  Advised him to lose weight    6  History of marijuana use   As per patient has quit smoking  7  Possible sleep apnea  At some point he will need a sleep study  He is scheduled to have sleep study follow-up 6 months    Continue current Rx      Patient was advised and educated to call our office  immediately if  patient has any new symptoms of chest pain/shortness of breath, near-syncope, syncope, light headedness sustained palpitations  or any other cardiovascular symptoms before their scheduled follow-up appointment  Office #[phone]  Thank you for your consultation  If you have any question please call me at [phone]    Counseling :  A description of the counseling  Goals and Barriers  Patient's ability to self care: Yes  Medication side effect reviewed with patient in detail and all their questions answered to their satisfaction  Primary Care Physician : Jennifer Tomlinson, DO      HPI :     Yessica Mills is a 59y o  year old male who was referred by primary care doctor for exertional shortness of breath and palpitations  Patient who has medical history significant for bradycardia, obesity with BMI around 44 has noted lately he is having exertional shortness of breath and then racing of his heart  He had gained about 10-15 lb in the last year and since then he has noted when he climbs stairs or he walks he gets short of breath and occasionally get palpitations  He was noted to have first-degree AV block as well as episodes of bradycardia today also his heart rate is 56 beats per minute he has sinus arrhythmia  And first-degree AV block EKG also shows T-wave inversions and ST flattening in inferior leads along with Q-waves in inferior leads  He denies any personal history of MI in the past   There is no family history of premature coronary artery disease but his mother did have pacemaker  Other medical history significant for history of kidney stones  He never smoked but lately he started using marijuana mostly in the evening for some time to sleep  No nausea no vomiting no fever no chills no leg edema  Past surgical history  Multiple orthopedic surgery including shoulder surgery and biceps surgery  06/29/2022  Above reviewed  Patient was initially seen by us for exertion shortness of breath and palpitations  He also had history of bradycardia    Holter monitor shows his average heart rate was 49 beats per minute and he has frequent PACs defer 7 6% of his total beats  He underwent nuclear stress test   He tried walking on the treadmill he did walk for 8 minutes and he could only achieve 73% of his maximum predicted heart rate and then he became bradycardic  He did with symptoms of dizziness and lightheadedness and he was in junctional bradycardia and test was changed to pharmacological test   Which shows he had a EKG changes as well as ischemia  Holter monitor shows patient has bradycardia for 35 hours per  He admits that he snores at night  He feels he has more easy fatigue and tired but denies any symptoms of dizziness or lightheadedness  He has not passed out  There is a family history of pacemaker his mother also had a pacemaker and his father also had a pacemaker at some point of his life  07/06/2022  Above reviewed  Patient came for follow-up  He was initially seen for exertional shortness of breath and history of bradycardia  His Holter monitor shows he has heart rate average around 49 beats per minute  He has frequent PACs there were 7 6% of his total beats  He feels palpitations he has short atrial runs and we were not able to treat with beta-blocker due to his slow heart rate  While he was in the hospital he was noted to have significant episodes of bradycardia with heart rate dropping to mid 30s  He also feels fatigue and tired and he has junctional bradycardia  His cardiac catheterization shows he had a distal circ disease which is chronic total occlusion and he has codominant RCA successfully stented with drug-eluting stent  His cholesterol was high and he is taking his medications  He used to smoke marijuana he has quit smoking it  He feels fatigue and tired but denies any nausea and vomiting recently he felt couple episode of dizziness  He had a family history of pacemakers  He has no nausea he denies any syncopal episode at this time    He came with his wife     08/12/2022  Above reviewed  Patient came for follow-up he is doing well  Denies any new complaints  He feels lot better since pacemaker  Pacemaker site has healed well  He is currently atrial paced  And ventricular sensed  He feels no exertion shortness of breath  He does have history of coronary artery disease status post angioplasty of RCA and has distal circ which is chronically occluded  He is now on high intensity statin  RCA was codominant artery no nausea no vomiting no other issues  11/14/2022     above reviewed  Patient came for follow-up denies any new complaints  He is atrial paced 86% of the time  He feels no exertional shortness of breath he is doing all his activities  He has angioplasty of RCA done with distal circ which is chronically occluded  He is on high intensity statin  No nausea no vomiting no fever no PND no orthopnea no other cardiovascular issues  He has medical history significant for bradycardia sick sinus syndrome status post pacemaker, frequent PACs, CAD status post angioplasty, obesity and sleep apnea he is trying to lose weight he has been on high intensity statin  Device interrogation reviewed with him  No other cardiovascular issues today  Review of Systems   Constitutional: Negative for activity change, chills, diaphoresis, fever and unexpected weight change  HENT: Negative for congestion  Eyes: Negative for discharge and redness  Respiratory: Negative for cough, chest tightness, shortness of breath and wheezing  Cardiovascular: Negative  Negative for chest pain, palpitations and leg swelling  Gastrointestinal: Negative for abdominal pain, diarrhea and nausea  Endocrine: Negative  Genitourinary: Negative for decreased urine volume and urgency  Musculoskeletal: Negative  Negative for arthralgias, back pain and gait problem  Skin: Negative for rash and wound  Allergic/Immunologic: Negative      Neurological: Negative for dizziness, seizures, syncope, weakness, light-headedness and headaches  Hematological: Negative  Psychiatric/Behavioral: Negative for agitation and confusion  The patient is not nervous/anxious  Historical Information   Past Medical History:   Diagnosis Date   • Hyperlipidemia    • Irregular heart beat     bradycardia post stress test, holter monitor on until 05/05/22 Dr Kev Mayfield   • Kidney stone      Past Surgical History:   Procedure Laterality Date   • BICEPS TENDON REPAIR Bilateral    • CARDIAC CATHETERIZATION N/A 7/20/2022    Procedure: Cardiac Coronary Angiogram;  Surgeon: Bentley Hernandez MD;  [facility]; Service: Cardiology   • CARDIAC CATHETERIZATION Left 7/20/2022    Procedure: Cardiac Left Heart Cath;  Surgeon: Bentley Hernandez MD;  [facility]; Service: Cardiology   • CARDIAC CATHETERIZATION N/A 7/20/2022    Procedure: Cardiac pci;  Surgeon: Bentley Hernandez MD;  [facility]; Service: Cardiology   • CARDIAC ELECTROPHYSIOLOGY PROCEDURE N/A 8/4/2022    Procedure: Cardiac pacer implant;  Surgeon: Bentley Hernandez MD;  Location: [place] [address] CATH LAB;   Service: Cardiology   • INGUINAL HERNIA REPAIR     • LASIK Bilateral    • ROTATOR CUFF REPAIR Right    • VASECTOMY       Social History     Substance and Sexual Activity   Alcohol Use Not Currently    Comment: rare     Social History     Substance and Sexual Activity   Drug Use Not Currently   • Types: Marijuana     Social History     Tobacco Use   Smoking Status Never Smoker   Smokeless Tobacco Never Used     Family History:   Family History   Problem Relation Age of Onset   • Hypertension Mother    • Diabetes Mother    • Hypertension Father    • Colon polyps Father    • Mental illness Neg Hx        Meds/Allergies     No Known Allergies    Current Outpatient Medications:   •  aspirin (ECOTRIN LOW STRENGTH) 81 mg EC tablet, Take 1 tablet (81 mg total) by mouth daily, Disp: 100 tablet, Rfl: 1  •  clopidogrel (PLAVIX) 75 mg tablet, Take 1 tablet (75 mg total) by mouth daily, Disp: 30 tablet, Rfl: 11  •  rosuvastatin (CRESTOR) 20 MG tablet, Take 1 tablet (20 mg total) by mouth daily, Disp: 90 tablet, Rfl: 1    Vitals: Blood pressure 128/82, pulse 83, temperature (!) 97 °F (36 1 °C), height 5' 10" (1 778 m), weight 122 kg (269 lb), SpO2 98 %  ?  Body mass index is 38 6 kg/m²  Wt Readings from Last 3 Encounters:   11/14/22 122 kg (269 lb)   08/12/22 120 kg (265 lb)   08/05/22 119 kg (261 lb 14 5 oz)     Vitals:    11/14/22 1122   Weight: 122 kg (269 lb)     BP Readings from Last 3 Encounters:   11/14/22 128/82   08/12/22 120/97   08/05/22 133/76         Physical Exam  Constitutional:       General: He is not in acute distress  Appearance: He is well-developed  He is not diaphoretic  Neck:      Thyroid: No thyromegaly  Vascular: No JVD  Trachea: No tracheal deviation  Cardiovascular:      Rate and Rhythm: Normal rate and regular rhythm  Heart sounds: S1 normal and S2 normal  Heart sounds not distant  Murmur heard  Systolic (ejection) murmur is present with a grade of 2/6  No friction rub  No gallop  No S3 or S4 sounds  Pulmonary:      Effort: Pulmonary effort is normal  No respiratory distress  Breath sounds: Normal breath sounds  No wheezing or rales  Chest:      Chest wall: No tenderness  Abdominal:      General: Bowel sounds are normal  There is no distension  Palpations: Abdomen is soft  Tenderness: There is no abdominal tenderness  Musculoskeletal:         General: No deformity  Cervical back: Neck supple  Skin:     General: Skin is warm and dry  Coloration: Skin is not pale  Findings: No rash  Neurological:      Mental Status: He is alert and oriented to person, place, and time     Psychiatric:         Behavior: Behavior normal          Judgment: Judgment normal             Diagnostic Studies Review Cardio:    Echo Doppler 05/03/2022 shows normal LV systolic function EF 15-60%, borderline LV thickness  No significant valvular disease  Nuclear stress test   Nuclear stress test shows small reversible ischemia in the inferior wall with sum score of 5  Patient did have some junctional rhythm during recovery along with bradycardia  EKG shows 1 mm ST depression which was upsloping and horizontal   EF was 64%  Holter monitor shows patient has average heart rate is 49 beats per minute  Maximum heart rate was 114 beats per minute  Patient has frequent PACs there were 7 6% of the total beats  Patient was in bradycardia for 35 hours  He has a 2 9 seconds pause during sleep hours  EKG:    Twelve lead EKG done on 04/07/2022 shows sinus bradycardia heart rate 56 beats per minute first-degree AV block  Q-wave noted in inferior leads with ST flattening cannot rule out inferior wall infarction    Lead EKG 08/12/2022 shows atrial paced rhythm ventricular sensed  Ice interrogation  Device interrogation done in September 2022 patient is using pacemaker 86% of time in the atrium and 2% ventricular  One episode of SVT noted  Imaging:  Chest X-Ray:   No Chest XR results available for this patient  CT-scan of the chest:     No CTA results available for this patient    Lab Review     BMP:  Lab Results   Component Value Date    SODIUM 132 (L) 08/05/2022    K 3 7 08/05/2022     08/05/2022    CO2 28 08/05/2022    BUN 13 08/05/2022    CREATININE 1 23 08/05/2022    GLUC 110 08/05/2022    GLUF 111 (H) 08/04/2022    CALCIUM 8 6 08/05/2022    EGFR 63 08/05/2022     Troponins:    LFT:  Lab Results   Component Value Date    AST 20 03/10/2022    ALT 25 03/10/2022    TP 6 8 03/10/2022    ALB 4 5 03/10/2022      Lipid Profile:   Lab Results   Component Value Date    CHOLESTEROL 227 (H) 03/10/2022    HDL 47 03/10/2022    LDLCALC 151 (H) 03/10/2022    TRIG 161 (H) 03/10/2022     Lab Results   Component Value Date    CHOLESTEROL 227 (H) 03/10/2022     The 10-year ASCVD risk score (Catherine Marroquin et al , 2013) is: 9 4%    Values used to calculate the score:      Age: 59 years      Sex: Male      Is Non- : No      Diabetic: No      Tobacco smoker: No      Systolic Blood Pressure: 396 mmHg      Is BP treated: No      HDL Cholesterol: 47 mg/dL      Total Cholesterol: 227 mg/dL      Dr Renae Keyes MD [facility] [place]      "This note has been constructed using a voice recognition system  Therefore there may be syntax, spelling, and/or grammatical errors   Please call if you have any questions  "

## 2023-01-17 DIAGNOSIS — E78.5 DYSLIPIDEMIA: ICD-10-CM

## 2023-01-17 RX ORDER — ASPIRIN 81 MG/1
81 TABLET ORAL DAILY
Qty: 90 TABLET | Refills: 3 | Status: SHIPPED | OUTPATIENT
Start: 2023-01-17

## 2023-02-13 ENCOUNTER — IN-CLINIC DEVICE VISIT (OUTPATIENT)
Dept: CARDIOLOGY CLINIC | Facility: CLINIC | Age: 58
End: 2023-02-13

## 2023-02-13 ENCOUNTER — TELEPHONE (OUTPATIENT)
Dept: CARDIOLOGY CLINIC | Facility: CLINIC | Age: 58
End: 2023-02-13

## 2023-02-13 DIAGNOSIS — Z95.0 CARDIAC PACEMAKER IN SITU: Primary | ICD-10-CM

## 2023-02-13 NOTE — TELEPHONE ENCOUNTER
----- Message from Umesh Shipley MD sent at 2/13/2023  2:16 PM EST -----  Patient's device interrogation reading shows,  device function is normal       Please call patient

## 2023-02-13 NOTE — PROGRESS NOTES
Results for orders placed or performed in visit on 02/13/23   Cardiac EP device report    Narrative    MDT DUAL PM/ACTIVE SYSTEM IS MRI CONDITIONAL  DEVICE INTERROGATED IN THE [place] OFFICE: BATTERY VOLTAGE ADEQUATE-13 YRS  AP 85%  2 4%  ALL AVAILABLE LEAD PARAMETERS & TRENDS WITHIN NORMAL LIMITS  17 FAST A/V NOTED; AVERG RATES 155-176 BPM  AVAIL EGRAMS PRESENT AS STACH  ASYMPTOMATIC  PT ON ASA & EF 60% (ECHO 2022)  NO PROGRAMMING CHANGES MADE TO DEVICE PARAMETERS  NORMAL DEVICE FUNCTION   NC

## 2023-03-26 DIAGNOSIS — E78.5 DYSLIPIDEMIA: ICD-10-CM

## 2023-03-27 RX ORDER — ROSUVASTATIN CALCIUM 20 MG/1
TABLET, COATED ORAL
Qty: 90 TABLET | Refills: 0 | Status: SHIPPED | OUTPATIENT
Start: 2023-03-27

## 2023-05-16 ENCOUNTER — OFFICE VISIT (OUTPATIENT)
Dept: CARDIOLOGY CLINIC | Facility: CLINIC | Age: 58
End: 2023-05-16

## 2023-05-16 VITALS
BODY MASS INDEX: 38.94 KG/M2 | DIASTOLIC BLOOD PRESSURE: 76 MMHG | WEIGHT: 272 LBS | OXYGEN SATURATION: 97 % | HEIGHT: 70 IN | HEART RATE: 81 BPM | SYSTOLIC BLOOD PRESSURE: 130 MMHG

## 2023-05-16 DIAGNOSIS — E78.5 DYSLIPIDEMIA: ICD-10-CM

## 2023-05-16 DIAGNOSIS — Z95.0 CARDIAC PACEMAKER IN SITU: ICD-10-CM

## 2023-05-16 DIAGNOSIS — E66.9 OBESITY (BMI 30-39.9): ICD-10-CM

## 2023-05-16 DIAGNOSIS — F12.10 MARIJUANA ABUSE: ICD-10-CM

## 2023-05-16 DIAGNOSIS — Z95.5 S/P CORONARY ARTERY STENT PLACEMENT: ICD-10-CM

## 2023-05-16 DIAGNOSIS — R06.02 EXERTIONAL SHORTNESS OF BREATH: ICD-10-CM

## 2023-05-16 DIAGNOSIS — I25.10 CORONARY ARTERY DISEASE INVOLVING NATIVE CORONARY ARTERY OF NATIVE HEART WITHOUT ANGINA PECTORIS: ICD-10-CM

## 2023-05-16 DIAGNOSIS — R00.2 PALPITATIONS: ICD-10-CM

## 2023-05-16 NOTE — PROGRESS NOTES
Progress note - Cardiology Office   [facility]  Maxime Murdock 59 y o  male MRN: [id number]  : 1965  Unit/Bed#:  Encounter: [id number]      Assessment:     1  Exertional shortness of breath    2  Coronary artery disease involving native coronary artery of native heart without angina pectoris    3  Cardiac pacemaker in situ    4  Dyslipidemia    5  Obesity (BMI 30-39 9)    6  Palpitations    7  Marijuana abuse    8  S/P coronary artery stent placement        Discussion summary and Plan:    1  Exertional shortness of breath  Patient exertional shortness of breath is no longer present  He had a successful angioplasty of his codominant RCA and has distal circ 100% occluded which is chronically occluded continue medical Rx  Nonobstructive disease of LAD noted   Continue aspirin and Plavix  2  Sick sinus syndrome with frequent PACs  Status post pacemaker of [facility]  No longer shortness of breath  He is pacing 85% of the time  Feeling well  Occasional episode of fast heartbeat  Blood pressure is acceptable  Pacemaker interrogation reviewed  3  Dyslipidemia  Continue statin he is on Crestor 20 mg  Fasting lipids and LFTs  4  Coronary artery disease status post angioplasty of codominant RCA and has 100% occluded distal circumflex which will be managed with medical therapy continue dual platelet therapy  Continue dual platelet therapy with currently on aspirin and Plavix    5  Obesity with BMI around 39  Encouraged him to lose weight  6  History of marijuana use   As per patient has quit smoking  7  Possible sleep apnea  At some point he will need a sleep study  He is scheduled to have sleep study follow-up 6 months    Continue current Rx      Patient was advised and educated to call our office  immediately if  patient has any new symptoms of chest pain/shortness of breath, near-syncope, syncope, light headedness sustained palpitations  or any other cardiovascular symptoms before their scheduled follow-up appointment  Office #[phone]  Thank you for your consultation  If you have any question please call me at [phone]    Counseling :  A description of the counseling  Goals and Barriers  Patient's ability to self care: Yes  Medication side effect reviewed with patient in detail and all their questions answered to their satisfaction  Primary Care Physician : Elsy Cote, DO      HPI :     Taylor Montelongo is a 59y o  year old male who was referred by primary care doctor for exertional shortness of breath and palpitations  Patient who has medical history significant for bradycardia, obesity with BMI around 44 has noted lately he is having exertional shortness of breath and then racing of his heart  He had gained about 10-15 lb in the last year and since then he has noted when he climbs stairs or he walks he gets short of breath and occasionally get palpitations  He was noted to have first-degree AV block as well as episodes of bradycardia today also his heart rate is 56 beats per minute he has sinus arrhythmia  And first-degree AV block EKG also shows T-wave inversions and ST flattening in inferior leads along with Q-waves in inferior leads  He denies any personal history of MI in the past   There is no family history of premature coronary artery disease but his mother did have pacemaker  Other medical history significant for history of kidney stones  He never smoked but lately he started using marijuana mostly in the evening for some time to sleep  No nausea no vomiting no fever no chills no leg edema  Past surgical history  Multiple orthopedic surgery including shoulder surgery and biceps surgery  06/29/2022  Above reviewed  Patient was initially seen by us for exertion shortness of breath and palpitations  He also had history of bradycardia    Holter monitor shows his average heart rate was 49 beats per minute and he has frequent PACs defer 7 6% of his total beats  He underwent nuclear stress test   He tried walking on the treadmill he did walk for 8 minutes and he could only achieve 73% of his maximum predicted heart rate and then he became bradycardic  He did with symptoms of dizziness and lightheadedness and he was in junctional bradycardia and test was changed to pharmacological test   Which shows he had a EKG changes as well as ischemia  Holter monitor shows patient has bradycardia for 35 hours per  He admits that he snores at night  He feels he has more easy fatigue and tired but denies any symptoms of dizziness or lightheadedness  He has not passed out  There is a family history of pacemaker his mother also had a pacemaker and his father also had a pacemaker at some point of his life  07/06/2022  Above reviewed  Patient came for follow-up  He was initially seen for exertional shortness of breath and history of bradycardia  His Holter monitor shows he has heart rate average around 49 beats per minute  He has frequent PACs there were 7 6% of his total beats  He feels palpitations he has short atrial runs and we were not able to treat with beta-blocker due to his slow heart rate  While he was in the hospital he was noted to have significant episodes of bradycardia with heart rate dropping to mid 30s  He also feels fatigue and tired and he has junctional bradycardia  His cardiac catheterization shows he had a distal circ disease which is chronic total occlusion and he has codominant RCA successfully stented with drug-eluting stent  His cholesterol was high and he is taking his medications  He used to smoke marijuana he has quit smoking it  He feels fatigue and tired but denies any nausea and vomiting recently he felt couple episode of dizziness  He had a family history of pacemakers  He has no nausea he denies any syncopal episode at this time    He came with his wife     08/12/2022  Above reviewed  Patient came for follow-up he is doing well  Denies any new complaints  He feels lot better since pacemaker  Pacemaker site has healed well  He is currently atrial paced  And ventricular sensed  He feels no exertion shortness of breath  He does have history of coronary artery disease status post angioplasty of RCA and has distal circ which is chronically occluded  He is now on high intensity statin  RCA was codominant artery no nausea no vomiting no other issues  11/14/2022     above reviewed  Patient came for follow-up denies any new complaints  He is atrial paced 86% of the time  He feels no exertional shortness of breath he is doing all his activities  He has angioplasty of RCA done with distal circ which is chronically occluded  He is on high intensity statin  No nausea no vomiting no fever no PND no orthopnea no other cardiovascular issues  He has medical history significant for bradycardia sick sinus syndrome status post pacemaker, frequent PACs, CAD status post angioplasty, obesity and sleep apnea he is trying to lose weight he has been on high intensity statin  Device interrogation reviewed with him  No other cardiovascular issues today  5/16/2023  Reviewed  Patient came for follow-up he is doing great  No cardiovascular issues  He is using pacemaker around 85% of the time  He is able to do all his activities  He has history of angioplasty of RCA with distal circumflex chronically occluded  He is on high intensity statin  Occasionally feels palpitations  Since he has pacemaker his functional capacity and exercise capacity is improved  Review of Systems   Constitutional: Negative for activity change, chills, diaphoresis, fever and unexpected weight change  HENT: Negative for congestion  Eyes: Negative for discharge and redness  Respiratory: Negative for cough, chest tightness, shortness of breath and wheezing  Cardiovascular: Negative  Negative for chest pain, palpitations and leg swelling  Gastrointestinal: Negative for abdominal pain, diarrhea and nausea  Endocrine: Negative  Genitourinary: Negative for decreased urine volume and urgency  Musculoskeletal: Negative  Negative for arthralgias, back pain and gait problem  Skin: Negative for rash and wound  Allergic/Immunologic: Negative  Neurological: Negative for dizziness, seizures, syncope, weakness, light-headedness and headaches  Hematological: Negative  Psychiatric/Behavioral: Negative for agitation and confusion  The patient is not nervous/anxious  Historical Information   Past Medical History:   Diagnosis Date   • Hyperlipidemia    • Irregular heart beat     bradycardia post stress test, holter monitor on until 05/05/22 Dr Ene Brooks   • Kidney stone      Past Surgical History:   Procedure Laterality Date   • BICEPS TENDON REPAIR Bilateral    • CARDIAC CATHETERIZATION N/A 7/20/2022    Procedure: Cardiac Coronary Angiogram;  Surgeon: Agustin Ayala MD;  [facility]; Service: Cardiology   • CARDIAC CATHETERIZATION Left 7/20/2022    Procedure: Cardiac Left Heart Cath;  Surgeon: Agustin Ayala MD;  [facility]; Service: Cardiology   • CARDIAC CATHETERIZATION N/A 7/20/2022    Procedure: Cardiac pci;  Surgeon: Agustin Ayala MD;  [facility]; Service: Cardiology   • CARDIAC ELECTROPHYSIOLOGY PROCEDURE N/A 8/4/2022    Procedure: Cardiac pacer implant;  Surgeon: Agustin Ayala MD;  Location: [place] [address] CATH LAB;   Service: Cardiology   • INGUINAL HERNIA REPAIR     • LASIK Bilateral    • ROTATOR CUFF REPAIR Right    • VASECTOMY       Social History     Substance and Sexual Activity   Alcohol Use Not Currently    Comment: rare     Social History     Substance and Sexual Activity   Drug Use Not Currently   • Types: Marijuana     Social History     Tobacco Use   Smoking Status Never "  Smokeless Tobacco Never     Family History:   Family History   Problem Relation Age of Onset   • Hypertension Mother    • Diabetes Mother    • Hypertension Father    • Colon polyps Father    • Mental illness Neg Hx        Meds/Allergies     No Known Allergies    Current Outpatient Medications:   •  aspirin (ECOTRIN LOW STRENGTH) 81 mg EC tablet, Take 1 tablet (81 mg total) by mouth daily, Disp: 90 tablet, Rfl: 3  •  clopidogrel (PLAVIX) 75 mg tablet, Take 1 tablet (75 mg total) by mouth daily, Disp: 30 tablet, Rfl: 11  •  rosuvastatin (CRESTOR) 20 MG tablet, Take 1 tablet by mouth once daily, Disp: 90 tablet, Rfl: 0    Vitals: Blood pressure 130/76, pulse 81, height 5' 10\" (1 778 m), weight 123 kg (272 lb), SpO2 97 %  ?  Body mass index is 39 03 kg/m²  Wt Readings from Last 3 Encounters:   05/16/23 123 kg (272 lb)   11/14/22 122 kg (269 lb)   08/12/22 120 kg (265 lb)     Vitals:    05/16/23 1033   Weight: 123 kg (272 lb)     BP Readings from Last 3 Encounters:   05/16/23 130/76   11/14/22 128/82   08/12/22 120/97         Physical Exam  Constitutional:       General: He is not in acute distress  Appearance: He is well-developed  He is not diaphoretic  Neck:      Thyroid: No thyromegaly  Vascular: No JVD  Trachea: No tracheal deviation  Cardiovascular:      Rate and Rhythm: Normal rate and regular rhythm  Heart sounds: S1 normal and S2 normal  Heart sounds not distant  Murmur heard  Systolic (ejection) murmur is present with a grade of 2/6  No friction rub  No gallop  No S3 or S4 sounds  Pulmonary:      Effort: Pulmonary effort is normal  No respiratory distress  Breath sounds: Normal breath sounds  No wheezing or rales  Chest:      Chest wall: No tenderness  Abdominal:      General: Bowel sounds are normal  There is no distension  Palpations: Abdomen is soft  Tenderness: There is no abdominal tenderness  Musculoskeletal:         General: No deformity        " Cervical back: Neck supple  Skin:     General: Skin is warm and dry  Coloration: Skin is not pale  Findings: No rash  Neurological:      Mental Status: He is alert and oriented to person, place, and time  Psychiatric:         Behavior: Behavior normal          Judgment: Judgment normal             Diagnostic Studies Review Cardio:    Echo Doppler 05/03/2022 shows normal LV systolic function EF 44-11%, borderline LV thickness  No significant valvular disease  Nuclear stress test   Nuclear stress test shows small reversible ischemia in the inferior wall with sum score of 5  Patient did have some junctional rhythm during recovery along with bradycardia  EKG shows 1 mm ST depression which was upsloping and horizontal   EF was 64%  Holter monitor shows patient has average heart rate is 49 beats per minute  Maximum heart rate was 114 beats per minute  Patient has frequent PACs there were 7 6% of the total beats  Patient was in bradycardia for 35 hours  He has a 2 9 seconds pause during sleep hours  EKG:    Twelve lead EKG done on 04/07/2022 shows sinus bradycardia heart rate 56 beats per minute first-degree AV block  Q-wave noted in inferior leads with ST flattening cannot rule out inferior wall infarction    Lead EKG 08/12/2022 shows atrial paced rhythm ventricular sensed  Twelve-lead EKG 5/16/2022 shows atrial paced ventricular sensed heart rate 81 bpm       Pacemaker interrogation  Device interrogation done in September 2022 patient is using pacemaker 86% of time in the atrium and 2% ventricular  One episode of SVT noted  Pacemaker interrogation in February 2023 pacemaker is functioning adequately  Around 85% atrial paced episode of 1 beats noted  Imaging:  Chest X-Ray:   No Chest XR results available for this patient  CT-scan of the chest:     No CTA results available for this patient    Lab Review     BMP:  Lab Results   Component Value Date    SODIUM 132 (L) "08/05/2022    K 3 7 08/05/2022     08/05/2022    CO2 28 08/05/2022    BUN 13 08/05/2022    CREATININE 1 23 08/05/2022    GLUC 110 08/05/2022    GLUF 111 (H) 08/04/2022    CALCIUM 8 6 08/05/2022    EGFR 63 08/05/2022     Troponins:    LFT:  Lab Results   Component Value Date    AST 20 03/10/2022    ALT 25 03/10/2022    TP 6 8 03/10/2022    ALB 4 5 03/10/2022      Lipid Profile:   Lab Results   Component Value Date    CHOLESTEROL 227 (H) 03/10/2022    HDL 47 03/10/2022    LDLCALC 151 (H) 03/10/2022    TRIG 161 (H) 03/10/2022     Lab Results   Component Value Date    CHOLESTEROL 227 (H) 03/10/2022     The 10-year ASCVD risk score (Shantanu CONNOR, et al , 2019) is: 10 4%    Values used to calculate the score:      Age: 59 years      Sex: Male      Is Non- : No      Diabetic: No      Tobacco smoker: No      Systolic Blood Pressure: 128 mmHg      Is BP treated: No      HDL Cholesterol: 47 mg/dL      Total Cholesterol: 227 mg/dL      Dr Akil Aguillon MD [facility]      \"This note has been constructed using a voice recognition system  Therefore there may be syntax, spelling, and/or grammatical errors  Please call if you have any questions   \"  "

## 2023-05-18 ENCOUNTER — REMOTE DEVICE CLINIC VISIT (OUTPATIENT)
Dept: CARDIOLOGY CLINIC | Facility: CLINIC | Age: 58
End: 2023-05-18

## 2023-05-18 ENCOUNTER — RA CDI HCC (OUTPATIENT)
Dept: OTHER | Facility: HOSPITAL | Age: 58
End: 2023-05-18

## 2023-05-18 DIAGNOSIS — Z95.0 PRESENCE OF PERMANENT CARDIAC PACEMAKER: Primary | ICD-10-CM

## 2023-05-18 NOTE — PROGRESS NOTES
[facility] 75  coding opportunities       Chart reviewed, no opportunity found: CHART REVIEWED, NO OPPORTUNITY FOUND        Patients Insurance        Commercial Insurance: Marilyn Hall

## 2023-05-18 NOTE — PROGRESS NOTES
Results for orders placed or performed in visit on 05/18/23   Cardiac EP device report    Narrative    MDT DUAL PM/ACTIVE SYSTEM IS MRI CONDITIONAL  CARELINK TRANSMISSION: BATTERY VOLTAGE ADEQUATE  (13 YRS) AP 84%  2%  ALL AVAILABLE LEAD PARAMETERS WITHIN NORMAL LIMITS  1 VHR EPISODE DETECTED  4 FAST A & V EPISODES DETECTED  ALL EGMS SHOW SVT  NORMAL DEVICE FUNCTION  ---SANDOVAL

## 2023-05-26 ENCOUNTER — OFFICE VISIT (OUTPATIENT)
Dept: FAMILY MEDICINE CLINIC | Facility: CLINIC | Age: 58
End: 2023-05-26

## 2023-05-26 ENCOUNTER — APPOINTMENT (OUTPATIENT)
Dept: RADIOLOGY | Facility: CLINIC | Age: 58
End: 2023-05-26

## 2023-05-26 VITALS
DIASTOLIC BLOOD PRESSURE: 80 MMHG | RESPIRATION RATE: 18 BRPM | WEIGHT: 269.38 LBS | TEMPERATURE: 97.1 F | HEART RATE: 96 BPM | SYSTOLIC BLOOD PRESSURE: 128 MMHG | HEIGHT: 69 IN | BODY MASS INDEX: 39.9 KG/M2

## 2023-05-26 DIAGNOSIS — Z12.5 SCREENING FOR PROSTATE CANCER: ICD-10-CM

## 2023-05-26 DIAGNOSIS — M10.371 ACUTE GOUT DUE TO RENAL IMPAIRMENT INVOLVING TOE OF RIGHT FOOT: ICD-10-CM

## 2023-05-26 DIAGNOSIS — I49.5 SICK SINUS SYNDROME (HCC): ICD-10-CM

## 2023-05-26 DIAGNOSIS — B35.6 JOCK ITCH: ICD-10-CM

## 2023-05-26 DIAGNOSIS — Z00.00 WELL ADULT EXAM: Primary | ICD-10-CM

## 2023-05-26 RX ORDER — COLCHICINE 0.6 MG/1
TABLET ORAL
Qty: 3 TABLET | Refills: 0 | Status: SHIPPED | OUTPATIENT
Start: 2023-05-26

## 2023-05-26 RX ORDER — CLOTRIMAZOLE AND BETAMETHASONE DIPROPIONATE 10; .64 MG/G; MG/G
CREAM TOPICAL 2 TIMES DAILY
Qty: 30 G | Refills: 0 | Status: SHIPPED | OUTPATIENT
Start: 2023-05-26

## 2023-05-26 RX ORDER — IBUPROFEN 800 MG/1
800 TABLET ORAL EVERY 6 HOURS PRN
Qty: 30 TABLET | Refills: 0 | Status: SHIPPED | OUTPATIENT
Start: 2023-05-26

## 2023-05-26 NOTE — PROGRESS NOTES
FAMILY PRACTICE HEALTH MAINTENANCE OFFICE VISIT  [facility]    NAME: Remigio Stark  AGE: 59 y o  SEX: male  : 1965     DATE: 2023    Assessment and Plan     1  Well adult exam    2  Screening for prostate cancer  -     PSA, Total Screen; Future    3  Sick sinus syndrome ([address] )    4  Acute gout due to renal impairment involving toe of right foot  -     colchicine (COLCRYS) 0 6 mg tablet; Take two pills upon getting the script and one pill an hr later  -     ibuprofen (MOTRIN) 800 mg tablet; Take 1 tablet (800 mg total) by mouth every 6 (six) hours as needed for mild pain  -     Uric acid; Future; Expected date: 2023  -     Uric acid  -     XR toe right great min 2 view; Future; Expected date: 2023    5  Jock itch  -     clotrimazole-betamethasone (LOTRISONE) 1-0 05 % cream; Apply topically 2 (two) times a day        Patient Counseling:   Nutrition: Stressed importance of a well balanced diet, moderation of sodium/saturated fat, caloric balance and sufficient intake of fiber  Exercise: Stressed the importance of regular exercise with a goal of 150 minutes per week  Dental Health: Discussed daily flossing and brushing and regular dental visits     Immunizations reviewed: Up To Date  Discussed benefits of:  Colon Cancer Screening, Prostate Cancer Screening  and Screening labs  BMI Counseling: Body mass index is 39 49 kg/m²  Discussed with patient's BMI with him  The BMI is above normal  Nutrition recommendations include reducing portion sizes  No follow-ups on file  Chief Complaint     Chief Complaint   Patient presents with   • Annual Exam     Lw cma       History of Present Illness     Pt is sched for a physical  Pt was dx with renetta and was seen by cardio had a pacer placed  Had his colon - multiple polyps - will have a follow up  Pt was seen by Dr Nola La        Pt states in addition to his physical pt states three days ago he woke up with a swollen pain ful foot  His wife had similar issue and states she had some meds left over - he took the med for one day  Next day he felt better  States last night his foot starrted hurting again and states he iced it because it swelled  States he feels like he has spasms by the big toe  Rt foot    Pt states a couple of time a year if he sweats a lot he gets a jock itch  States he does not have it now  Would like a refill of lotrisone      Well Adult Physical   Patient here for a comprehensive physical exam       Diet and Physical Activity  Diet: well balanced diet  Exercise: infrequently      Depression Screen  PHQ-2/9 Depression Screening    Little interest or pleasure in doing things: 0 - not at all  Feeling down, depressed, or hopeless: 0 - not at all  PHQ-2 Score: 0  PHQ-2 Interpretation: Negative depression screen          General Health  Hearing: Normal:  bilateral  Vision: wears glasses  Dental: regular dental visits    Reproductive Health  No issues       The following portions of the patient's history were reviewed and updated as appropriate: allergies, current medications, past family history, past medical history, past social history, past surgical history and problem list     Review of Systems     Review of Systems   Constitutional: Negative for activity change, appetite change, chills, diaphoresis, fatigue, fever and unexpected weight change  HENT: Negative for congestion, dental problem, ear pain, mouth sores, sinus pressure, sinus pain, sore throat and trouble swallowing  Eyes: Negative for photophobia, discharge and itching  Respiratory: Negative for apnea, chest tightness and shortness of breath  Cardiovascular: Negative for chest pain, palpitations and leg swelling  Gastrointestinal: Negative for abdominal distention, abdominal pain, blood in stool, nausea and vomiting  Endocrine: Negative for cold intolerance, heat intolerance, polydipsia, polyphagia and polyuria  Genitourinary: Negative for difficulty urinating  Musculoskeletal: Positive for arthralgias  Skin: Negative for color change and wound  Neurological: Negative for dizziness, syncope, speech difficulty and headaches  Hematological: Negative for adenopathy  Psychiatric/Behavioral: Negative for agitation and behavioral problems  Past Medical History     Past Medical History:   Diagnosis Date   • Hyperlipidemia    • Irregular heart beat     bradycardia post stress test, holter monitor on until 05/05/22 Dr Juarez Mendosa   • Kidney stone        Past Surgical History     Past Surgical History:   Procedure Laterality Date   • BICEPS TENDON REPAIR Bilateral    • CARDIAC CATHETERIZATION N/A 7/20/2022    Procedure: Cardiac Coronary Angiogram;  Surgeon: Brenda Martin MD;  [facility]; Service: Cardiology   • CARDIAC CATHETERIZATION Left 7/20/2022    Procedure: Cardiac Left Heart Cath;  Surgeon: Brenda Martin MD;  [facility]; Service: Cardiology   • CARDIAC CATHETERIZATION N/A 7/20/2022    Procedure: Cardiac pci;  Surgeon: Brenda Martin MD;  [facility]; Service: Cardiology   • CARDIAC ELECTROPHYSIOLOGY PROCEDURE N/A 8/4/2022    Procedure: Cardiac pacer implant;  Surgeon: Brenda Martin MD;  Location: [place] [id number] CATH LAB;   Service: Cardiology   • INGUINAL HERNIA REPAIR     • LASIK Bilateral    • ROTATOR CUFF REPAIR Right    • VASECTOMY         Social History     Social History     Socioeconomic History   • Marital status: /Civil Union     Spouse name: None   • Number of children: None   • Years of education: None   • Highest education level: None   Occupational History   • None   Tobacco Use   • Smoking status: Never   • Smokeless tobacco: Never   Vaping Use   • Vaping Use: Former   • Substances: THC   Substance and Sexual Activity   • Alcohol use: Not Currently     Comment: rare   • Drug use: Not Currently     Types: Marijuana   • Sexual activity: Yes   Other Topics Concern   • None   Social History Narrative   • None     Social Determinants of Health     Financial Resource Strain: Not on file   Food Insecurity: No Food Insecurity (8/4/2022)    Hunger Vital Sign    • Worried About Running Out of Food in the Last Year: Never true    • Ran Out of Food in the Last Year: Never true   Transportation Needs: No Transportation Needs (8/4/2022)    PRAPARE - Transportation    • Lack of Transportation (Medical): No    • Lack of Transportation (Non-Medical):  No   Physical Activity: Not on file   Stress: Not on file   Social Connections: Not on file   Intimate Partner Violence: Not on file   Housing Stability: Low Risk  (8/4/2022)    Housing Stability Vital Sign    • Unable to Pay for Housing in the Last Year: No    • Number of Places Lived in the Last Year: 1    • Unstable Housing in the Last Year: No       Family History     Family History   Problem Relation Age of Onset   • Hypertension Mother    • Diabetes Mother    • Hypertension Father    • Colon polyps Father    • Mental illness Neg Hx        Current Medications       Current Outpatient Medications:   •  aspirin (ECOTRIN LOW STRENGTH) 81 mg EC tablet, Take 1 tablet (81 mg total) by mouth daily, Disp: 90 tablet, Rfl: 3  •  clopidogrel (PLAVIX) 75 mg tablet, Take 1 tablet (75 mg total) by mouth daily, Disp: 30 tablet, Rfl: 11  •  clotrimazole-betamethasone (LOTRISONE) 1-0 05 % cream, Apply topically 2 (two) times a day, Disp: 30 g, Rfl: 0  •  colchicine (COLCRYS) 0 6 mg tablet, Take two pills upon getting the script and one pill an hr later, Disp: 3 tablet, Rfl: 0  •  ibuprofen (MOTRIN) 800 mg tablet, Take 1 tablet (800 mg total) by mouth every 6 (six) hours as needed for mild pain, Disp: 30 tablet, Rfl: 0  •  rosuvastatin (CRESTOR) 20 MG tablet, Take 1 tablet by mouth once daily, Disp: 90 tablet, Rfl: 0     Allergies     No Known Allergies    Objective     /80   Pulse 96   Temp (!) 97 1 °F (36 2 "°C) (Tympanic)   Resp 18   Ht 5' 9 25\" (1 759 m)   Wt 122 kg (269 lb 6 oz)   BMI 39 49 kg/m²      Physical Exam  Vitals and nursing note reviewed  Constitutional:       General: He is not in acute distress  Appearance: He is well-developed  He is not diaphoretic  HENT:      Head: Normocephalic and atraumatic  Right Ear: External ear normal       Left Ear: External ear normal       Nose: Nose normal       Mouth/Throat:      Pharynx: No oropharyngeal exudate  Eyes:      General: No scleral icterus  Right eye: No discharge  Left eye: No discharge  Pupils: Pupils are equal, round, and reactive to light  Neck:      Thyroid: No thyromegaly  Cardiovascular:      Rate and Rhythm: Normal rate  Heart sounds: Normal heart sounds  No murmur heard  Pulmonary:      Effort: Pulmonary effort is normal  No respiratory distress  Breath sounds: Normal breath sounds  No wheezing  Abdominal:      General: Bowel sounds are normal  There is no distension  Palpations: Abdomen is soft  There is no mass  Tenderness: There is no abdominal tenderness  There is no guarding or rebound  Musculoskeletal:         General: Normal range of motion  Comments: Rt great toe swolled , red and tender   Skin:     General: Skin is warm and dry  Findings: No erythema or rash  Neurological:      Mental Status: He is alert        Coordination: Coordination normal       Deep Tendon Reflexes: Reflexes normal    Psychiatric:         Behavior: Behavior normal            Vision Screening    Right eye Left eye Both eyes   Without correction 20/15 20/15 20/10   With correction              Valerie Benitez DO  [address]  "

## 2023-05-30 ENCOUNTER — TELEPHONE (OUTPATIENT)
Dept: FAMILY MEDICINE CLINIC | Facility: CLINIC | Age: 58
End: 2023-05-30

## 2023-05-30 DIAGNOSIS — M10.371 ACUTE GOUT DUE TO RENAL IMPAIRMENT INVOLVING TOE OF RIGHT FOOT: Primary | ICD-10-CM

## 2023-05-30 RX ORDER — METHYLPREDNISOLONE 4 MG/1
TABLET ORAL
Qty: 21 EACH | Refills: 0 | Status: SHIPPED | OUTPATIENT
Start: 2023-05-30

## 2023-05-30 NOTE — TELEPHONE ENCOUNTER
Pt was recently seen for gout, he was given a couple of pills but last night he had a flare up and today it is still hurting, what should he do?

## 2023-05-31 NOTE — TELEPHONE ENCOUNTER
This result is final  Would you please review since Dr Hernandez [place] is not in today?  Thanks [place]

## 2023-06-13 LAB
ALBUMIN SERPL-MCNC: 4.6 G/DL (ref 3.8–4.9)
ALBUMIN/GLOB SERPL: 1.9 {RATIO} (ref 1.2–2.2)
ALP SERPL-CCNC: 48 IU/L (ref 44–121)
ALT SERPL-CCNC: 33 IU/L (ref 0–44)
AST SERPL-CCNC: 23 IU/L (ref 0–40)
BASOPHILS # BLD AUTO: 0 X10E3/UL (ref 0–0.2)
BASOPHILS NFR BLD AUTO: 1 %
BILIRUB SERPL-MCNC: 0.8 MG/DL (ref 0–1.2)
BUN SERPL-MCNC: 11 MG/DL (ref 8–27)
BUN/CREAT SERPL: 11 (ref 10–24)
CALCIUM SERPL-MCNC: 9 MG/DL (ref 8.6–10.2)
CHLORIDE SERPL-SCNC: 101 MMOL/L (ref 96–106)
CHOLEST SERPL-MCNC: 154 MG/DL (ref 100–199)
CHOLEST/HDLC SERPL: 3 RATIO (ref 0–5)
CO2 SERPL-SCNC: 23 MMOL/L (ref 20–29)
CREAT SERPL-MCNC: 1 MG/DL (ref 0.76–1.27)
EGFR: 86 ML/MIN/1.73
EOSINOPHIL # BLD AUTO: 0.5 X10E3/UL (ref 0–0.4)
EOSINOPHIL NFR BLD AUTO: 7 %
ERYTHROCYTE [DISTWIDTH] IN BLOOD BY AUTOMATED COUNT: 13.1 % (ref 11.6–15.4)
GLOBULIN SER-MCNC: 2.4 G/DL (ref 1.5–4.5)
GLUCOSE SERPL-MCNC: 106 MG/DL (ref 70–99)
HCT VFR BLD AUTO: 46.1 % (ref 37.5–51)
HDLC SERPL-MCNC: 52 MG/DL
HGB BLD-MCNC: 15.6 G/DL (ref 13–17.7)
IMM GRANULOCYTES # BLD: 0 X10E3/UL (ref 0–0.1)
IMM GRANULOCYTES NFR BLD: 0 %
LDLC SERPL CALC-MCNC: 74 MG/DL (ref 0–99)
LYMPHOCYTES # BLD AUTO: 2.1 X10E3/UL (ref 0.7–3.1)
LYMPHOCYTES NFR BLD AUTO: 31 %
MCH RBC QN AUTO: 29.8 PG (ref 26.6–33)
MCHC RBC AUTO-ENTMCNC: 33.8 G/DL (ref 31.5–35.7)
MCV RBC AUTO: 88 FL (ref 79–97)
MONOCYTES # BLD AUTO: 0.7 X10E3/UL (ref 0.1–0.9)
MONOCYTES NFR BLD AUTO: 10 %
NEUTROPHILS # BLD AUTO: 3.4 X10E3/UL (ref 1.4–7)
NEUTROPHILS NFR BLD AUTO: 51 %
PLATELET # BLD AUTO: 181 X10E3/UL (ref 150–450)
POTASSIUM SERPL-SCNC: 4.1 MMOL/L (ref 3.5–5.2)
PROT SERPL-MCNC: 7 G/DL (ref 6–8.5)
PSA SERPL-MCNC: 0.5 NG/ML (ref 0–4)
RBC # BLD AUTO: 5.23 X10E6/UL (ref 4.14–5.8)
SL AMB VLDL CHOLESTEROL CALC: 28 MG/DL (ref 5–40)
SODIUM SERPL-SCNC: 139 MMOL/L (ref 134–144)
TRIGL SERPL-MCNC: 164 MG/DL (ref 0–149)
TSH SERPL DL<=0.005 MIU/L-ACNC: 3.1 UIU/ML (ref 0.45–4.5)
URATE SERPL-MCNC: 6.4 MG/DL (ref 3.8–8.4)
WBC # BLD AUTO: 6.7 X10E3/UL (ref 3.4–10.8)

## 2023-06-20 DIAGNOSIS — E78.5 DYSLIPIDEMIA: ICD-10-CM

## 2023-06-20 RX ORDER — ROSUVASTATIN CALCIUM 20 MG/1
TABLET, COATED ORAL
Qty: 90 TABLET | Refills: 0 | Status: SHIPPED | OUTPATIENT
Start: 2023-06-20

## 2023-07-12 ENCOUNTER — TELEPHONE (OUTPATIENT)
Dept: CARDIOLOGY CLINIC | Facility: CLINIC | Age: 58
End: 2023-07-12

## 2023-07-12 DIAGNOSIS — I25.10 CORONARY ARTERY DISEASE INVOLVING NATIVE CORONARY ARTERY OF NATIVE HEART WITHOUT ANGINA PECTORIS: ICD-10-CM

## 2023-07-12 DIAGNOSIS — Z95.5 S/P CORONARY ARTERY STENT PLACEMENT: Primary | ICD-10-CM

## 2023-07-12 RX ORDER — CLOPIDOGREL BISULFATE 75 MG/1
75 TABLET ORAL DAILY
Qty: 90 TABLET | Refills: 1 | Status: SHIPPED | OUTPATIENT
Start: 2023-07-12

## 2023-08-17 ENCOUNTER — REMOTE DEVICE CLINIC VISIT (OUTPATIENT)
Dept: CARDIOLOGY CLINIC | Facility: CLINIC | Age: 58
End: 2023-08-17
Payer: COMMERCIAL

## 2023-08-17 DIAGNOSIS — Z95.0 PRESENCE OF PERMANENT CARDIAC PACEMAKER: Primary | ICD-10-CM

## 2023-08-17 PROCEDURE — 93294 REM INTERROG EVL PM/LDLS PM: CPT | Performed by: INTERNAL MEDICINE

## 2023-08-17 PROCEDURE — 93296 REM INTERROG EVL PM/IDS: CPT | Performed by: INTERNAL MEDICINE

## 2023-08-17 NOTE — PROGRESS NOTES
CARELINK TRANSMISSION:   BATTERY VOLTAGE ADEQUATE. (12.7 YRS) AP 82%  4%. ALL AVAILABLE LEAD PARAMETERS WITHIN NORMAL LIMITS. NO SIGNIFICANT HIGH RATE EPISODES. 14 FAST A & V EPISODES DETECTED. ALL EGMS SHOW SVT. NORMAL DEVICE FUNCTION. ---SANDOVAL

## 2023-08-22 ENCOUNTER — OFFICE VISIT (OUTPATIENT)
Dept: GASTROENTEROLOGY | Facility: CLINIC | Age: 58
End: 2023-08-22
Payer: COMMERCIAL

## 2023-08-22 ENCOUNTER — TELEPHONE (OUTPATIENT)
Dept: GASTROENTEROLOGY | Facility: CLINIC | Age: 58
End: 2023-08-22

## 2023-08-22 VITALS
SYSTOLIC BLOOD PRESSURE: 139 MMHG | HEIGHT: 69 IN | HEART RATE: 69 BPM | DIASTOLIC BLOOD PRESSURE: 94 MMHG | BODY MASS INDEX: 40.29 KG/M2 | WEIGHT: 272 LBS

## 2023-08-22 DIAGNOSIS — Z12.11 COLON CANCER SCREENING: Primary | ICD-10-CM

## 2023-08-22 DIAGNOSIS — D12.6 TUBULAR ADENOMA OF COLON: ICD-10-CM

## 2023-08-22 PROCEDURE — 99214 OFFICE O/P EST MOD 30 MIN: CPT | Performed by: INTERNAL MEDICINE

## 2023-08-22 NOTE — H&P (VIEW-ONLY)
[facility]  Progress Note - Santo Plunkett 59 y.o. male MRN: [id number]    Unit/Bed#:  Encounter: [id number]    Assessment/Plan:  1. Colon cancer screening-has history of colon polyps, underwent colonoscopy last year with 11 colon polyps, multiple polyps notable for tubular adenomas.  -Patient is currently on Plavix and aspirin for history of CAD/pacemaker. Has been on Plavix for a year. This with cardiology regarding holding this 5 days prior to the procedure.  -Would recommend scheduling increased risk screening at this time.  -Patient prefers Sutab prep. Prep instructions given. Patient was explained about the risks and benefits of the procedure. Risks including but not limited to bleeding, infection, perforation were explained in detail. Also explained about less than 100% sensitivity with the exam and other alternatives. Subjective: This is a 42-year-old male with history of hypertension, hyperlipidemia, sick sinus syndrome, status post pacemaker, CAD on aspirin and Plavix, presents for colon cancer screening evaluation. Patient underwent colonoscopy by me 1 year ago. This was notable for multiple colon polyps, greater than 10. Multiple polyps were tubular adenomas therefore was recommended repeat colonoscopy 1 year interval.  He denies any change in bowel habits. No family history of colon cancer family history of colon polyps in father which required bowel resection. He also denies any changes in upper GI symptoms, denies acid reflux, dysphagia, loss of appetite, early satiety. Objective:     Vitals: Blood pressure 139/94, pulse 69, height 5' 9.25" (1.759 m), weight 123 kg (272 lb). ,Body mass index is 39.88 kg/m².     [unfilled]    Physical Exam:    GEN: wn/wd, NAD  HEENT: MMM, no cervical or supraclavicular LAD, anciteric  CV: RRR, no m/r/g  CHEST: CTA b/l, no w/r/r  ABD: +BS, soft, NT/ND, no hepatosplenomegaly  EXT: no c/c/e  SKIN: no rashes  NEURO: aaox3      Invasive Devices     None                         Lab, Imaging and other studies:     No visits with results within 1 Day(s) from this visit. Latest known visit with results is:   Orders Only on 06/12/2023   Component Date Value   • Prostate Specific Antige* 06/12/2023 0.5          I have personally reviewed pertinent films in PACS    No current facility-administered medications for this visit. Answers for HPI/ROS submitted by the patient on 8/15/2023  anorexia: No  arthralgias: No  belching: No  constipation: No  diarrhea: No  dysuria: No  fever: No  flatus: No  frequency: No  headaches: No  hematochezia: No  hematuria: No  melena: No  myalgias: No  nausea:  No  weight loss: No  vomiting: No

## 2023-08-22 NOTE — PATIENT INSTRUCTIONS
Scheduled date of colonoscopy (as of today):09/07/23  Physician performing colonoscopy:Deb  Location of colonoscopy:[facility]  Bowel prep reviewed with patient:Sutab  Instructions reviewed with patient by:Calli TRISTAN  Clearances:  Plavix

## 2023-08-22 NOTE — PROGRESS NOTES
[facility]  Progress Note - Kevin Kim 59 y.o. male MRN: [id number]    Unit/Bed#:  Encounter: [id number]    Assessment/Plan:  1. Colon cancer screening-has history of colon polyps, underwent colonoscopy last year with 11 colon polyps, multiple polyps notable for tubular adenomas.  -Patient is currently on Plavix and aspirin for history of CAD/pacemaker. Has been on Plavix for a year. This with cardiology regarding holding this 5 days prior to the procedure.  -Would recommend scheduling increased risk screening at this time.  -Patient prefers Sutab prep. Prep instructions given. Patient was explained about the risks and benefits of the procedure. Risks including but not limited to bleeding, infection, perforation were explained in detail. Also explained about less than 100% sensitivity with the exam and other alternatives. Subjective: This is a 58-year-old male with history of hypertension, hyperlipidemia, sick sinus syndrome, status post pacemaker, CAD on aspirin and Plavix, presents for colon cancer screening evaluation. Patient underwent colonoscopy by me 1 year ago. This was notable for multiple colon polyps, greater than 10. Multiple polyps were tubular adenomas therefore was recommended repeat colonoscopy 1 year interval.  He denies any change in bowel habits. No family history of colon cancer family history of colon polyps in father which required bowel resection. He also denies any changes in upper GI symptoms, denies acid reflux, dysphagia, loss of appetite, early satiety. Objective:     Vitals: Blood pressure 139/94, pulse 69, height 5' 9.25" (1.759 m), weight 123 kg (272 lb). ,Body mass index is 39.88 kg/m².     [unfilled]    Physical Exam:    GEN: wn/wd, NAD  HEENT: MMM, no cervical or supraclavicular LAD, anciteric  CV: RRR, no m/r/g  CHEST: CTA b/l, no w/r/r  ABD: +BS, soft, NT/ND, no hepatosplenomegaly  EXT: no c/c/e  SKIN: no rashes  NEURO: aaox3      Invasive Devices     None                         Lab, Imaging and other studies:     No visits with results within 1 Day(s) from this visit. Latest known visit with results is:   Orders Only on 06/12/2023   Component Date Value   • Prostate Specific Antige* 06/12/2023 0.5          I have personally reviewed pertinent films in PACS    No current facility-administered medications for this visit. Answers for HPI/ROS submitted by the patient on 8/15/2023  anorexia: No  arthralgias: No  belching: No  constipation: No  diarrhea: No  dysuria: No  fever: No  flatus: No  frequency: No  headaches: No  hematochezia: No  hematuria: No  melena: No  myalgias: No  nausea:  No  weight loss: No  vomiting: No

## 2023-08-24 NOTE — TELEPHONE ENCOUNTER
Pre. Op. Clearance note- Cardiology    Jing Ortiz   59 y.o.  male  1965      Dr. Horan  :     Patient's chart was reviewed for preop clearance. Patient was seen in our office on 05/16/2023. Patient has past medical history significant for CAD, S/P coronary artery stent, Pacemaker, dyslipidemia. Patient is now scheduled for colonoscopy 9/7/23. Patient has no clinical evidence of  active heart failure or  active ischemia or active arrhythmia. Patient's last cardiac workup including his maker interrogation August 2023, card catheterization in July 2022 reports were reviewed and it shows normally functioning pacemaker history of coronary artery s/p angioplasty of RCA in July 2022. In my opinion patient is in optimum condition for the procedure as planned. Patient is low risk for the surgery as planned from cardiac point of view. Continue current cardiac medications. Patient can hold his Plavix for 5 days. Continue aspirin if possible. Please restart after the procedure  immediately or next day if no contraindication form surgical point of view and advise patient to contact our office. If you have any question please do not hesitate to call us at our office of [facility]. Phone # [phone]        Lab Results   Component Value Date    WBC 6.7 06/12/2023    HGB 15.6 06/12/2023    HCT 46.1 06/12/2023    MCV 88 06/12/2023     06/12/2023     Lab Results   Component Value Date    CREATININE 1.00 06/12/2023     Lab Results   Component Value Date    GLUF 111 (H) 08/04/2022         Results for orders placed during the hospital encounter of 05/03/22    NM myocardial perfusion spect (stress and/or rest)    Interpretation Summary  •  Abnormal pharmacologic nuclear stress test.  There is a small amount of reversible ischemia noted in the inferior wall with a summed differential score of 5.   Patient developed transient junctional rhythm during the recovery along with profound bradycardia. •  Stress ECG: Up to 1.0 mm upsloping and horizontal ST depression in the inferior leads is noted. Arrhythmias during recovery:  Junctional rhythm, rare PACs, rare PVCs. The ECG was equivocal for ischemia. The stress ECG is equivocal for ischemia after pharmacologic stress, without reproduction of symptoms. •  Perfusion: There is a small reversible perfusion defect that is noted in the entire inferior wall. Summed stress score is 5 and differential score is also 5.  •  Stress Function: Over Left ventricular function post-stress is normal.  Calculated ejection fraction is 64%. •  Perfusion Defect Conclusion: There is no evidence of transient ischemic dilation (TID). No results found for this or any previous visit. Nuha Vicente MD [facility]  8/24/2023  9:29 AM      "This note was completed in part utilizing [employer] direct voice recognition software. Grammatical errors, random word insertion, spelling mistakes, and incomplete sentences may be an occasional consequence of the system secondary to software limitations, ambient noise and hardware issues. Please read the chart carefully and recognize, using context, where substitutions have occurred.   If you have any questions or concerns about the context, text or information contained within the body of this dictation, please contact myself, the provider, for further clarification."

## 2023-08-24 NOTE — TELEPHONE ENCOUNTER
I spoke with patient, advised on instructions.    However, patient denies taking aspirin due to bruising

## 2023-09-06 RX ORDER — SODIUM CHLORIDE, SODIUM LACTATE, POTASSIUM CHLORIDE, CALCIUM CHLORIDE 600; 310; 30; 20 MG/100ML; MG/100ML; MG/100ML; MG/100ML
75 INJECTION, SOLUTION INTRAVENOUS CONTINUOUS
Status: CANCELLED | OUTPATIENT
Start: 2023-09-06

## 2023-09-07 ENCOUNTER — ANESTHESIA EVENT (OUTPATIENT)
Dept: GASTROENTEROLOGY | Facility: AMBULARY SURGERY CENTER | Age: 58
End: 2023-09-07

## 2023-09-07 ENCOUNTER — ANESTHESIA (OUTPATIENT)
Dept: GASTROENTEROLOGY | Facility: AMBULARY SURGERY CENTER | Age: 58
End: 2023-09-07

## 2023-09-07 ENCOUNTER — HOSPITAL ENCOUNTER (OUTPATIENT)
Dept: GASTROENTEROLOGY | Facility: AMBULARY SURGERY CENTER | Age: 58
Setting detail: OUTPATIENT SURGERY
End: 2023-09-07
Attending: INTERNAL MEDICINE
Payer: COMMERCIAL

## 2023-09-07 VITALS
WEIGHT: 271.17 LBS | DIASTOLIC BLOOD PRESSURE: 64 MMHG | SYSTOLIC BLOOD PRESSURE: 114 MMHG | TEMPERATURE: 97.1 F | HEART RATE: 60 BPM | OXYGEN SATURATION: 98 % | RESPIRATION RATE: 16 BRPM | BODY MASS INDEX: 40.16 KG/M2 | HEIGHT: 69 IN

## 2023-09-07 DIAGNOSIS — Z12.11 COLON CANCER SCREENING: ICD-10-CM

## 2023-09-07 PROBLEM — E78.5 HYPERLIPIDEMIA: Status: ACTIVE | Noted: 2023-09-07

## 2023-09-07 PROBLEM — Z98.61 HISTORY OF PTCA: Status: ACTIVE | Noted: 2023-09-07

## 2023-09-07 PROCEDURE — 88305 TISSUE EXAM BY PATHOLOGIST: CPT | Performed by: STUDENT IN AN ORGANIZED HEALTH CARE EDUCATION/TRAINING PROGRAM

## 2023-09-07 RX ORDER — LIDOCAINE HYDROCHLORIDE 10 MG/ML
INJECTION, SOLUTION EPIDURAL; INFILTRATION; INTRACAUDAL; PERINEURAL AS NEEDED
Status: DISCONTINUED | OUTPATIENT
Start: 2023-09-07 | End: 2023-09-07

## 2023-09-07 RX ORDER — PROPOFOL 10 MG/ML
INJECTION, EMULSION INTRAVENOUS AS NEEDED
Status: DISCONTINUED | OUTPATIENT
Start: 2023-09-07 | End: 2023-09-07

## 2023-09-07 RX ORDER — PROPOFOL 10 MG/ML
INJECTION, EMULSION INTRAVENOUS CONTINUOUS PRN
Status: DISCONTINUED | OUTPATIENT
Start: 2023-09-07 | End: 2023-09-07

## 2023-09-07 RX ORDER — SODIUM CHLORIDE, SODIUM LACTATE, POTASSIUM CHLORIDE, CALCIUM CHLORIDE 600; 310; 30; 20 MG/100ML; MG/100ML; MG/100ML; MG/100ML
75 INJECTION, SOLUTION INTRAVENOUS CONTINUOUS
Status: DISCONTINUED | OUTPATIENT
Start: 2023-09-07 | End: 2023-09-11 | Stop reason: HOSPADM

## 2023-09-07 RX ADMIN — SODIUM CHLORIDE, SODIUM LACTATE, POTASSIUM CHLORIDE, AND CALCIUM CHLORIDE: .6; .31; .03; .02 INJECTION, SOLUTION INTRAVENOUS at 10:18

## 2023-09-07 RX ADMIN — LIDOCAINE HYDROCHLORIDE 50 MG: 10 INJECTION, SOLUTION EPIDURAL; INFILTRATION; INTRACAUDAL at 10:26

## 2023-09-07 RX ADMIN — PROPOFOL 130 MG: 10 INJECTION, EMULSION INTRAVENOUS at 10:26

## 2023-09-07 RX ADMIN — PROPOFOL 130 MCG/KG/MIN: 10 INJECTION, EMULSION INTRAVENOUS at 10:26

## 2023-09-07 NOTE — INTERVAL H&P NOTE
H&P reviewed. After examining the patient I find no changes in the patients condition since the H&P had been written.     Vitals:    09/07/23 0957   BP: 139/77   Pulse: 81   Resp: 18   Temp: (!) 97.1 °F (36.2 °C)   SpO2: 98%

## 2023-09-07 NOTE — ANESTHESIA POSTPROCEDURE EVALUATION
Post-Op Assessment Note    CV Status:  Stable  Pain Score: 0    Pain management: adequate     Mental Status:  Alert and awake   Hydration Status:  Euvolemic   PONV Controlled:  Controlled   Airway Patency:  Patent      Post Op Vitals Reviewed: Yes      Staff: CRNA, Anesthesiologist   Comments: Report given to recovering RN, VSS, Pt stateshe is comfortable        There were no known notable events for this encounter.     /68 (09/07/23 1111)    Temp     Pulse 63 (09/07/23 1111)   Resp 16 (09/07/23 1111)    SpO2 97 % (09/07/23 1111)

## 2023-09-08 DIAGNOSIS — E78.5 DYSLIPIDEMIA: ICD-10-CM

## 2023-09-08 RX ORDER — ROSUVASTATIN CALCIUM 20 MG/1
TABLET, COATED ORAL
Qty: 90 TABLET | Refills: 0 | Status: SHIPPED | OUTPATIENT
Start: 2023-09-08

## 2023-09-12 PROCEDURE — 88305 TISSUE EXAM BY PATHOLOGIST: CPT | Performed by: STUDENT IN AN ORGANIZED HEALTH CARE EDUCATION/TRAINING PROGRAM

## 2023-10-21 PROBLEM — Z12.11 COLON CANCER SCREENING: Status: RESOLVED | Noted: 2022-03-24 | Resolved: 2023-10-21

## 2023-11-30 ENCOUNTER — REMOTE DEVICE CLINIC VISIT (OUTPATIENT)
Dept: CARDIOLOGY CLINIC | Facility: CLINIC | Age: 58
End: 2023-11-30
Payer: COMMERCIAL

## 2023-11-30 DIAGNOSIS — Z95.0 PRESENCE OF PERMANENT CARDIAC PACEMAKER: Primary | ICD-10-CM

## 2023-11-30 DIAGNOSIS — E78.5 DYSLIPIDEMIA: ICD-10-CM

## 2023-11-30 PROCEDURE — 93294 REM INTERROG EVL PM/LDLS PM: CPT | Performed by: INTERNAL MEDICINE

## 2023-11-30 PROCEDURE — 93296 REM INTERROG EVL PM/IDS: CPT | Performed by: INTERNAL MEDICINE

## 2023-11-30 RX ORDER — ROSUVASTATIN CALCIUM 20 MG/1
TABLET, COATED ORAL
Qty: 90 TABLET | Refills: 0 | Status: SHIPPED | OUTPATIENT
Start: 2023-11-30

## 2023-11-30 NOTE — PROGRESS NOTES
Results for orders placed or performed in visit on 11/30/23   Cardiac EP device report    Narrative    MDT DUAL PM/ACTIVE SYSTEM IS MRI CONDITIONAL  CARELINK TRANSMISSION: BATTERY VOLTAGE ADEQUATE. (12.6 YRS) AP 85%  2%. ALL AVAILABLE LEAD PARAMETERS WITHIN NORMAL LIMITS. NO SIGNIFICANT HIGH RATE EPISODES. NORMAL DEVICE FUNCTION. ---SANDOVAL

## 2023-12-13 ENCOUNTER — OFFICE VISIT (OUTPATIENT)
Dept: CARDIOLOGY CLINIC | Facility: CLINIC | Age: 58
End: 2023-12-13
Payer: COMMERCIAL

## 2023-12-13 VITALS
SYSTOLIC BLOOD PRESSURE: 130 MMHG | BODY MASS INDEX: 40.73 KG/M2 | HEART RATE: 83 BPM | HEIGHT: 69 IN | WEIGHT: 275 LBS | OXYGEN SATURATION: 98 % | DIASTOLIC BLOOD PRESSURE: 70 MMHG

## 2023-12-13 DIAGNOSIS — R06.02 EXERTIONAL SHORTNESS OF BREATH: ICD-10-CM

## 2023-12-13 DIAGNOSIS — R00.2 PALPITATIONS: ICD-10-CM

## 2023-12-13 DIAGNOSIS — I25.10 CORONARY ARTERY DISEASE INVOLVING NATIVE CORONARY ARTERY OF NATIVE HEART WITHOUT ANGINA PECTORIS: ICD-10-CM

## 2023-12-13 DIAGNOSIS — Z95.5 S/P CORONARY ARTERY STENT PLACEMENT: ICD-10-CM

## 2023-12-13 DIAGNOSIS — E66.9 OBESITY (BMI 30-39.9): ICD-10-CM

## 2023-12-13 DIAGNOSIS — E78.5 DYSLIPIDEMIA: ICD-10-CM

## 2023-12-13 DIAGNOSIS — F12.10 MARIJUANA ABUSE: ICD-10-CM

## 2023-12-13 DIAGNOSIS — Z95.0 STATUS POST PLACEMENT OF CARDIAC PACEMAKER: ICD-10-CM

## 2023-12-13 PROCEDURE — 93000 ELECTROCARDIOGRAM COMPLETE: CPT | Performed by: INTERNAL MEDICINE

## 2023-12-13 PROCEDURE — 99214 OFFICE O/P EST MOD 30 MIN: CPT | Performed by: INTERNAL MEDICINE

## 2023-12-13 NOTE — PROGRESS NOTES
Progress note - Cardiology Office  [facility]. Florence Bass 59 y.o. male MRN: [id number]  : 1965  Unit/Bed#:  Encounter: [id number]      Assessment:     1. Exertional shortness of breath    2. Coronary artery disease involving native coronary artery of native heart without angina pectoris    3. S/P coronary artery stent placement    4. Sick sinus syndrome status post dual-chamber pacemaker of [employer] with septal pacing    5. Dyslipidemia    6. Marijuana abuse    7. Obesity (BMI 30-39.9)    8. Palpitations        Discussion summary and Plan:    1. Exertional shortness of breath. Patient exertional shortness of breath is no longer present. He had a successful angioplasty of his codominant RCA and has distal circ 100% occluded which is chronically occluded continue medical Rx. Nonobstructive disease of LAD noted. Continue aspirin and Plavix. 2. Sick sinus syndrome with frequent PACs. Status post pacemaker of [address]. No longer shortness of breath. He is pacing 85% of the time. He is feeling great. Pacemaker interrogation from 2023 reviewed. 3. Dyslipidemia. Continue statin he is on Crestor 20 mg. Fasting lipids and LFTs. Labs reviewed from 2023.    4. Coronary artery disease status post angioplasty of codominant RCA and has 100% occluded distal circumflex which will be managed with medical therapy continue dual platelet therapy. Continue dual platelet therapy with currently on aspirin and Plavix. No symptoms of angina    5. Obesity with BMI around  40. Encouraged him to lose weight    6. History of marijuana use . As per patient he has quit smoking marijuana now. 7. Possible sleep apnea. Possible sleep apnea he is elected to sleep study is that he may not use CPAP machine. Encouraged him to get sleep study done.     Follow-up about 7 months        Patient was advised and educated to call our office  immediately if patient has any new symptoms of chest pain/shortness of breath, near-syncope, syncope, light headedness sustained palpitations  or any other cardiovascular symptoms before their scheduled follow-up appointment. Office #[phone]. Thank you for your consultation. If you have any question please call me at [phone]    Counseling :  A description of the counseling. Goals and Barriers. Patient's ability to self care: Yes  Medication side effect reviewed with patient in detail and all their questions answered to their satisfaction. Primary Care Physician : Randall Chaparro DO      HPI :     Tremaine Chawla is a 59y.o. year old male who was referred by primary care doctor for exertional shortness of breath and palpitations. Patient who has medical history significant for bradycardia, obesity with BMI around 44 has noted lately he is having exertional shortness of breath and then racing of his heart. He had gained about 10-15 lb in the last year and since then he has noted when he climbs stairs or he walks he gets short of breath and occasionally get palpitations. He was noted to have first-degree AV block as well as episodes of bradycardia today also his heart rate is 56 beats per minute he has sinus arrhythmia. And first-degree AV block EKG also shows T-wave inversions and ST flattening in inferior leads along with Q-waves in inferior leads. He denies any personal history of MI in the past.  There is no family history of premature coronary artery disease but his mother did have pacemaker. Other medical history significant for history of kidney stones. He never smoked but lately he started using marijuana mostly in the evening for some time to sleep. No nausea no vomiting no fever no chills no leg edema. Past surgical history. Multiple orthopedic surgery including shoulder surgery and biceps surgery. 06/29/2022. Above reviewed.   Patient was initially seen by us for exertion shortness of breath and palpitations. He also had history of bradycardia. Holter monitor shows his average heart rate was 49 beats per minute and he has frequent PACs defer 7.6% of his total beats. He underwent nuclear stress test.  He tried walking on the treadmill he did walk for 8 minutes and he could only achieve 73% of his maximum predicted heart rate and then he became bradycardic. He did with symptoms of dizziness and lightheadedness and he was in junctional bradycardia and test was changed to pharmacological test.  Which shows he had a EKG changes as well as ischemia. Holter monitor shows patient has bradycardia for 35 hours per  He admits that he snores at night. He feels he has more easy fatigue and tired but denies any symptoms of dizziness or lightheadedness. He has not passed out. There is a family history of pacemaker his mother also had a pacemaker and his father also had a pacemaker at some point of his life. 11/14/2022     above reviewed. Patient came for follow-up denies any new complaints. He is atrial paced 86% of the time. He feels no exertional shortness of breath he is doing all his activities. He has angioplasty of RCA done with distal circ which is chronically occluded. He is on high intensity statin. No nausea no vomiting no fever no PND no orthopnea no other cardiovascular issues. He has medical history significant for bradycardia sick sinus syndrome status post pacemaker, frequent PACs, CAD status post angioplasty, obesity and sleep apnea he is trying to lose weight he has been on high intensity statin. Device interrogation reviewed with him. No other cardiovascular issues today. 5/16/2023. Reviewed. Patient came for follow-up he is doing great. No cardiovascular issues. He is using pacemaker around 85% of the time. He is able to do all his activities. He has history of angioplasty of RCA with distal circumflex chronically occluded.   He is on high intensity statin. Occasionally feels palpitations. Since he has pacemaker his functional capacity and exercise capacity is improved. 12/13/2023. Above reviewed. Patient came for follow-up. He has medical history significant for sick sinus syndrome s/p pacemaker, CAD with a history of angioplasty of RCA with distal circumflex chronically occluded, hypertensive heart disease, possible obstructive sleep apnea, obesity who came for follow-up. His device was interrogated in November 2023. He is using pacemaker about 85% of the time. He still occasionally uses marijuana. His blood test from 6/12/2023 reviewed. His electrolyte profile acceptable vitals are stable. He feels great denies any new symptoms no chest pain no shortness of breath no other cardiovascular issues at this time. Review of Systems   Constitutional:  Negative for activity change, chills, diaphoresis, fever and unexpected weight change. HENT:  Negative for congestion. Eyes:  Negative for discharge and redness. Respiratory:  Negative for cough, chest tightness, shortness of breath and wheezing. Cardiovascular: Negative. Negative for chest pain, palpitations and leg swelling. Gastrointestinal:  Negative for abdominal pain, diarrhea and nausea. Endocrine: Negative. Genitourinary:  Negative for decreased urine volume and urgency. Musculoskeletal: Negative. Negative for arthralgias, back pain and gait problem. Skin:  Negative for rash and wound. Allergic/Immunologic: Negative. Neurological:  Negative for dizziness, seizures, syncope, weakness, light-headedness and headaches. Hematological: Negative. Psychiatric/Behavioral:  Negative for agitation and confusion. The patient is not nervous/anxious.         Historical Information   Past Medical History:   Diagnosis Date   • Hyperlipidemia    • Irregular heart beat     bradycardia post stress test, holter monitor on until 05/05/22 Dr Tuan Key   • Kidney stone Past Surgical History:   Procedure Laterality Date   • BICEPS TENDON REPAIR Bilateral    • CARDIAC CATHETERIZATION N/A 7/20/2022    Procedure: Cardiac Coronary Angiogram;  Surgeon: Js Drummond MD;  [facility]; Service: Cardiology   • CARDIAC CATHETERIZATION Left 7/20/2022    Procedure: Cardiac Left Heart Cath;  Surgeon: Js Drummond MD;  [facility]; Service: Cardiology   • CARDIAC CATHETERIZATION N/A 7/20/2022    Procedure: Cardiac pci;  Surgeon: Js Drummond MD;  [facility]; Service: Cardiology   • CARDIAC ELECTROPHYSIOLOGY PROCEDURE N/A 8/4/2022    Procedure: Cardiac pacer implant;  Surgeon: Js Drummond MD;  Location: [address] CATH LAB;   Service: Cardiology   • INGUINAL HERNIA REPAIR     • LASIK Bilateral    • ROTATOR CUFF REPAIR Right    • VASECTOMY       Social History     Substance and Sexual Activity   Alcohol Use Not Currently    Comment: rare     Social History     Substance and Sexual Activity   Drug Use Not Currently   • Types: Marijuana     Social History     Tobacco Use   Smoking Status Never   Smokeless Tobacco Never     Family History:   Family History   Problem Relation Age of Onset   • Hypertension Mother    • Diabetes Mother    • Hypertension Father    • Colon polyps Father    • Mental illness Neg Hx        Meds/Allergies     No Known Allergies    Current Outpatient Medications:   •  aspirin (ECOTRIN LOW STRENGTH) 81 mg EC tablet, Take 1 tablet (81 mg total) by mouth daily, Disp: 90 tablet, Rfl: 3  •  clopidogrel (PLAVIX) 75 mg tablet, Take 1 tablet (75 mg total) by mouth daily, Disp: 90 tablet, Rfl: 1  •  clotrimazole-betamethasone (LOTRISONE) 1-0.05 % cream, Apply topically 2 (two) times a day, Disp: 30 g, Rfl: 0  •  colchicine (COLCRYS) 0.6 mg tablet, Take two pills upon getting the script and one pill an hr later, Disp: 3 tablet, Rfl: 0  •  ibuprofen (MOTRIN) 800 mg tablet, Take 1 tablet (800 mg total) by mouth every 6 (six) hours as needed for mild pain, Disp: 30 tablet, Rfl: 0  •  methylPREDNISolone 4 MG tablet therapy pack, Use as directed on package, Disp: 21 each, Rfl: 0  •  rosuvastatin (CRESTOR) 20 MG tablet, Take 1 tablet by mouth once daily, Disp: 90 tablet, Rfl: 0    Vitals: Blood pressure 130/70, pulse 83, height 5' 9" (1.753 m), weight 125 kg (275 lb), SpO2 98%. ?  Body mass index is 40.61 kg/m². Wt Readings from Last 3 Encounters:   12/13/23 125 kg (275 lb)   09/07/23 123 kg (271 lb 2.7 oz)   08/22/23 123 kg (272 lb)     Vitals:    12/13/23 1525   Weight: 125 kg (275 lb)       BP Readings from Last 3 Encounters:   12/13/23 130/70   09/07/23 114/64   08/22/23 139/94         Physical Exam  Constitutional:       General: He is not in acute distress. Appearance: He is well-developed. He is not diaphoretic. Neck:      Thyroid: No thyromegaly. Vascular: No JVD. Trachea: No tracheal deviation. Cardiovascular:      Rate and Rhythm: Normal rate and regular rhythm. Heart sounds: S1 normal and S2 normal. Heart sounds not distant. Murmur heard. Systolic (ejection) murmur is present with a grade of 2/6. No friction rub. No gallop. No S3 or S4 sounds. Pulmonary:      Effort: Pulmonary effort is normal. No respiratory distress. Breath sounds: Normal breath sounds. No wheezing or rales. Chest:      Chest wall: No tenderness. Abdominal:      General: Bowel sounds are normal. There is no distension. Palpations: Abdomen is soft. Tenderness: There is no abdominal tenderness. Musculoskeletal:         General: No deformity. Cervical back: Neck supple. Skin:     General: Skin is warm and dry. Coloration: Skin is not pale. Findings: No rash. Neurological:      Mental Status: He is alert and oriented to person, place, and time.    Psychiatric:         Behavior: Behavior normal.         Judgment: Judgment normal.            Diagnostic Studies Review Cardio:    Echo Doppler 05/03/2022 shows normal LV systolic function EF 02-95%, borderline LV thickness. No significant valvular disease. Nuclear stress test.  Nuclear stress test shows small reversible ischemia in the inferior wall with sum score of 5. Patient did have some junctional rhythm during recovery along with bradycardia. EKG shows 1 mm ST depression which was upsloping and horizontal.  EF was 64%. Holter monitor shows patient has average heart rate is 49 beats per minute. Maximum heart rate was 114 beats per minute. Patient has frequent PACs there were 7.6% of the total beats. Patient was in bradycardia for 35 hours. He has a 2.9 seconds pause during sleep hours. EKG:    Twelve lead EKG done on 04/07/2022 shows sinus bradycardia heart rate 56 beats per minute first-degree AV block. Q-wave noted in inferior leads with ST flattening cannot rule out inferior wall infarction    Lead EKG 08/12/2022 shows atrial paced rhythm ventricular sensed. Twelve-lead EKG 5/16/2022 shows atrial paced ventricular sensed heart rate 81 bpm.    Twelve-lead EKG done on 12/13/2023 shows atrial paced ventricular sensed heart rate 85 bpm.      Pacemaker interrogation. Device interrogation done in September 2022 patient is using pacemaker 86% of time in the atrium and 2% ventricular. One episode of SVT noted. Pacemaker interrogation in February 2023 pacemaker is functioning adequately. Around 85% atrial paced episode of 1 beats noted. Imaging:  Chest X-Ray:   No Chest XR results available for this patient. CT-scan of the chest:     No CTA results available for this patient.   Lab Review     BMP:  Lab Results   Component Value Date    SODIUM 139 06/12/2023    K 4.1 06/12/2023     06/12/2023    CO2 23 06/12/2023    BUN 11 06/12/2023    CREATININE 1.00 06/12/2023    GLUC 106 (H) 06/12/2023    GLUF 111 (H) 08/04/2022    CALCIUM 8.6 08/05/2022    EGFR 86 06/12/2023     Troponins:    LFT:  Lab Results   Component Value Date    AST 23 06/12/2023    ALT 33 06/12/2023    TP 7.0 06/12/2023    ALB 4.6 06/12/2023      Lipid Profile:   Lab Results   Component Value Date    CHOLESTEROL 154 06/12/2023    HDL 52 06/12/2023    LDLCALC 74 06/12/2023    TRIG 164 (H) 06/12/2023     Lab Results   Component Value Date    CHOLESTEROL 154 06/12/2023    CHOLESTEROL 227 (H) 03/10/2022     The 10-year ASCVD risk score (Shantanu CONNOR, et al., 2019) is: 6.8%    Values used to calculate the score:      Age: 59 years      Sex: Male      Is Non- : No      Diabetic: No      Tobacco smoker: No      Systolic Blood Pressure: 455 mmHg      Is BP treated: No      HDL Cholesterol: 52 mg/dL      Total Cholesterol: 154 mg/dL      Dr. Braydon Fernandez MD [facility] [place]      "This note has been constructed using a voice recognition system. Therefore there may be syntax, spelling, and/or grammatical errors.  Please call if you have any questions. "

## 2024-01-03 DIAGNOSIS — Z95.5 S/P CORONARY ARTERY STENT PLACEMENT: ICD-10-CM

## 2024-01-03 DIAGNOSIS — I25.10 CORONARY ARTERY DISEASE INVOLVING NATIVE CORONARY ARTERY OF NATIVE HEART WITHOUT ANGINA PECTORIS: ICD-10-CM

## 2024-01-03 RX ORDER — CLOPIDOGREL BISULFATE 75 MG/1
75 TABLET ORAL DAILY
Qty: 90 TABLET | Refills: 0 | Status: SHIPPED | OUTPATIENT
Start: 2024-01-03

## 2024-01-25 ENCOUNTER — TELEPHONE (OUTPATIENT)
Dept: CARDIOLOGY CLINIC | Facility: CLINIC | Age: 59
End: 2024-01-25

## 2024-01-25 DIAGNOSIS — I48.91 ATRIAL FIBRILLATION, UNSPECIFIED TYPE (HCC): Primary | ICD-10-CM

## 2024-01-25 NOTE — TELEPHONE ENCOUNTER
Patient has been noted to be atrial fibrillation.  He need to be on Eliquis 5 mg twice a day.  Please call the prescription.  He can be on Eliquis and Plavix once he is on Eliquis we will stop aspirin.

## 2024-01-25 NOTE — TELEPHONE ENCOUNTER
Benjie JACOBO Date of birth January 3rd, 1965. I noticed my heart feeling a little weird starting last night like palpitations and I got a call back this morning from [facility]. I'm trying to call back but you guys are not answering. So give me a call back [phone], [phone]. Benjie Jacobo January 3rd, 1965. Thank you.

## 2024-01-26 NOTE — TELEPHONE ENCOUNTER
Spoke with patient made aware. Pt.feels better today.  Discussed with pt.the importance of taking blood thinners for treatment of Afib. Pt.verbalized understanding.

## 2024-01-29 ENCOUNTER — OFFICE VISIT (OUTPATIENT)
Dept: CARDIOLOGY CLINIC | Facility: CLINIC | Age: 59
End: 2024-01-29
Payer: COMMERCIAL

## 2024-01-29 VITALS
HEIGHT: 69 IN | DIASTOLIC BLOOD PRESSURE: 72 MMHG | HEART RATE: 84 BPM | OXYGEN SATURATION: 97 % | WEIGHT: 279 LBS | SYSTOLIC BLOOD PRESSURE: 128 MMHG | BODY MASS INDEX: 41.32 KG/M2

## 2024-01-29 DIAGNOSIS — I48.0 PAROXYSMAL A-FIB (HCC): Primary | ICD-10-CM

## 2024-01-29 DIAGNOSIS — I49.5 SICK SINUS SYNDROME (HCC): Primary | ICD-10-CM

## 2024-01-29 DIAGNOSIS — E78.5 HYPERLIPIDEMIA, UNSPECIFIED HYPERLIPIDEMIA TYPE: ICD-10-CM

## 2024-01-29 PROCEDURE — 99214 OFFICE O/P EST MOD 30 MIN: CPT | Performed by: PHYSICIAN ASSISTANT

## 2024-01-29 PROCEDURE — 93000 ELECTROCARDIOGRAM COMPLETE: CPT | Performed by: PHYSICIAN ASSISTANT

## 2024-01-29 NOTE — PROGRESS NOTES
Progress Note - Cardiology Office  [facility]    Benjie Ellis 59 y.o. male MRN: [id number]  : 1965  Encounter: [id number]      Assessment:     Paroxysmal atrial fibrillation.  Coronary artery disease.  Sick sinus syndrome s/p dual-chamber PPM.  Dyslipidemia.  Class III obesity.  Suspected obstructive sleep apnea.      Discussion Summary and Plan:    Paroxysmal atrial fibrillation.  - Presents for evaluation after cardiac EP device report from 24 indicated patient was in new onset atrial fibrillation on evening of 24. Patient's primary cardiologist, Dr. Espinoza, was notified of 2024 cardiac EP device report which indicated patient was in new onset atrial fibrillation on evening of 24.  Patient was started on Eliquis 5 mg twice daily.  - 24 cardiac EP device report: MDT dual PM/active system is MRI conditional.  Patient in A-fib since last night.  Other available egrams present sinus tachycardia and RVR greater than 130 bpm.  Battery status, 12 years.  AP 80%,  1%.  All available lead parameters within normal limits.  - No evidence of atrial fibrillation on today's office visit EKG.  - 24 EKG: Atrial paced rhythm, 84 bpm.  Inferior infarct, noted since 2022.  - Interrogated PPM at bedside.  Interrogation notes 50 episodes of fast a and F as well as 5 episodes of AT/AF.  Longest episode of AT/AF on 24, duration 8 hours and 44 minutes.  - Currently on Eliquis 5 mg twice daily.  - [id number] stroke risk score: 0 points, low risk.  - Will continue Eliquis 5 mg twice daily given interrogation has shown more than 1 episode of atrial fibrillation.  - 22 TTE: LVEF 60-65%.  Diastolic function normal.  Right ventricle systolic pressure is normal.  Aortic valve leaflets show mild sclerosis, leaflets exhibit normal mobility.  Mitral valve with trace regurgitation.  Trace tricuspid valve regurgitation.  Right ventricle systolic pressure is  normal.  - Not currently on beta-blocker, unclear as to why.  Patient is unaware of why he is not on a beta-blocker.  He requests discussion with primary cardiologist, Dr. Espinoza regarding initiation of beta-blocker or to starting.    Coronary artery disease.  - Stable.  Patient denies experiencing chest pain.  - s/p PCI with KADEEM of mid RCA on 7/20/22.  - 7/20/22 cardiac catheterization: Mid RCA lesion is 99% stenosed. Which was successfully stented with drug-eluting stent decreasing stenosis to 0 no complication. LPAV lesion is 100% stenosed. This is a chronic total occlusion. We tried to pass the wire. Wire would not cross easily. Hence it will be managed with medical therapy. It is small to medium size vessel Mid LAD lesion is 35% stenosed. The angioplasty was pre-stent angioplasty. Patient's LVEDP was upper normal there was no gradient across aortic valve.  - Continue Plavix 75 mg daily.  - Continue Crestor 20 mg daily.  - Not currently on beta-blocker, unclear as to why.  Patient is unaware of why he is not on a beta-blocker.  He requests discussion with primary cardiologist, Dr. Espinoza regarding initiation of beta-blocker or to starting.  - 5/03/22 TTE: LVEF 60-65%.  Diastolic function normal.  Right ventricle systolic pressure is normal.  Aortic valve leaflets show mild sclerosis, leaflets exhibit normal mobility.  Mitral valve with trace regurgitation.  Trace tricuspid valve regurgitation.  Right ventricle systolic pressure is normal.    Sick sinus syndrome s/p dual-chamber PPM.  - s/p dual-chamber PPM ([employer]tronic) on 8/04/22.   - 1/25/24 cardiac EP device report: MDT dual PM/active system is MRI conditional.  Patient in A-fib since last night.  Other available egrams present sinus tachycardia and RVR greater than 130 bpm.  Battery status, 12 years.  AP 80%,  1%.  All available lead parameters within normal limits.    Dyslipidemia.  - Continue Crestor 20 mg once daily.  - 6/12/23 lipid panel: 154,  triglycerides 164, HDL 52, LDL 74.  - Discussed with patient recommendations for low carbohydrate diet.    Class III obesity.  - BMI 41.20 kg/m2.     Suspected obstructive sleep apnea.  - Review of chart documents concern for obstructive sleep apnea in the past, as far as 2022.  - Will discuss home sleep study to rule out obstructive sleep apnea.        Patient / Caretaker was advised and educated to call our office  immediately if  patient has any new symptoms of chest pain/shortness of breath, near-syncope, syncope, light headedness sustained palpitations  or any other cardiovascular symptoms before their scheduled follow-up appointment.  Office number was provided #[phone].  Please call [phone] if any questions.  Counseling :  A description of the counseling.  Goals and Barriers.  Patient's ability to self care: Yes  Medication side effect reviewed with patient in detail and all their questions answered to their satisfaction.    HPI :     Benjie Ellis is a 59 y.o. male with PMHx of paroxysmal atrial fibrillation (on Eliquis), CAD s/p PCI with KADEEM of mid RCA (7/20/22), SSS s/p dual-chamber PPM ([employer]tronic, 8/04/22), dyslipidemia, who presents for evaluation after cardiac EP device report from 1/25/24 indicated patient was in new onset atrial fibrillation on evening of 1/24/24.     Patient's primary cardiologist, Dr. Espinoza, was notified of 1/25/2024 cardiac EP device report which indicated patient was in new onset atrial fibrillation on evening of 1/24/24.  Patient was started on Eliquis 5 mg twice daily.      Patient states that he noted having palpitations and mild shortness of breath on the evening of 1/24/24.  He denies any additional symptoms including chest pain, lightheadedness, dizziness, headache, nausea, vomiting or diaphoresis.  He denies having any additional episodes since the evening of 1/24/24.     Review of Systems   Constitutional:  Negative for activity change, appetite change, chills,  diaphoresis, fatigue, fever and unexpected weight change.   Respiratory:  Negative for chest tightness, shortness of breath and wheezing.    Cardiovascular:  Positive for palpitations. Negative for chest pain and leg swelling.   Gastrointestinal:  Negative for abdominal distention, abdominal pain, constipation, diarrhea, nausea and vomiting.   Neurological:  Negative for dizziness, syncope, facial asymmetry, weakness, light-headedness and headaches.       Historical Information   Past Medical History:   Diagnosis Date    Hyperlipidemia     Irregular heart beat     bradycardia post stress test, holter monitor on until 05/05/22 Dr Espinoza    Kidney stone      Past Surgical History:   Procedure Laterality Date    BICEPS TENDON REPAIR Bilateral     CARDIAC CATHETERIZATION N/A 7/20/2022    Procedure: Cardiac Coronary Angiogram;  Surgeon: Mercedez Espinoza MD;  Location: [facility];  Service: Cardiology    CARDIAC CATHETERIZATION Left 7/20/2022    Procedure: Cardiac Left Heart Cath;  Surgeon: Mercedez Espinoza MD;  Location: [facility];  Service: Cardiology    CARDIAC CATHETERIZATION N/A 7/20/2022    Procedure: Cardiac pci;  Surgeon: Mercedez Espinoza MD;  Location: [facility];  Service: Cardiology    CARDIAC ELECTROPHYSIOLOGY PROCEDURE N/A 8/4/2022    Procedure: Cardiac pacer implant;  Surgeon: Mercedez Espinoza MD;  Location: [facility];  Service: Cardiology    INGUINAL HERNIA REPAIR      LASIK Bilateral     ROTATOR CUFF REPAIR Right     VASECTOMY       Social History     Substance and Sexual Activity   Alcohol Use Not Currently    Comment: rare     Social History     Substance and Sexual Activity   Drug Use Not Currently    Types: Marijuana     Social History     Tobacco Use   Smoking Status Never   Smokeless Tobacco Never     Family History:   Family History   Problem Relation Age of Onset    Hypertension Mother     Diabetes Mother     Hypertension Father     Colon polyps Father      "Mental illness Neg Hx        Meds/Allergies     No Known Allergies    Current Outpatient Medications:     apixaban (Eliquis) 5 mg, Take 1 tablet (5 mg total) by mouth 2 (two) times a day, Disp: 180 tablet, Rfl: 0    clopidogrel (PLAVIX) 75 mg tablet, Take 1 tablet by mouth once daily, Disp: 90 tablet, Rfl: 0    rosuvastatin (CRESTOR) 20 MG tablet, Take 1 tablet by mouth once daily, Disp: 90 tablet, Rfl: 0    Vitals: Blood pressure 128/72, pulse 84, height 5' 9\" (1.753 m), weight 127 kg (279 lb), SpO2 97%.    Body mass index is 41.2 kg/m².  Wt Readings from Last 3 Encounters:   24 127 kg (279 lb)   23 125 kg (275 lb)   23 123 kg (271 lb 2.7 oz)     Vitals:    24 1451   Weight: 127 kg (279 lb)     BP Readings from Last 3 Encounters:   24 128/72   23 130/70   23 114/64       Physical Exam:  Physical Exam  Vitals reviewed.   Constitutional:       General: He is not in acute distress.     Appearance: He is obese.   Cardiovascular:      Rate and Rhythm: Normal rate and regular rhythm.      Pulses: Normal pulses.      Heart sounds: Murmur heard.   Pulmonary:      Effort: Pulmonary effort is normal. No respiratory distress.      Breath sounds: Normal breath sounds.   Abdominal:      General: Abdomen is flat.      Palpations: Abdomen is soft.      Tenderness: There is no abdominal tenderness.   Musculoskeletal:      Right lower leg: No edema.      Left lower leg: No edema.   Skin:     General: Skin is warm and dry.   Neurological:      Mental Status: He is alert and oriented to person, place, and time.         Diagnostic Studies Review Cardio:      EK/29/24 EKG: Atrial paced rhythm, 84 beats minute.  Inferior infarct, noted on or before 2022.    Cardiac testing:     Cardiac EP device report    Result Date: 2024  Narrative: MDT DUAL PM/ACTIVE SYSTEM IS MRI CONDITIONAL 2 DIFFERENT TRANSMISSION INCLUDED. NB/ALERT-CARELINK TRANSMISSION: PT IN AFIB SINCE LAST NIGHT. NO " "AC NOTED; ONLY TAKING PLAVIX; STATES HE'S BEEN SOB & HAVING PALPITATIONS. SENT TO DR. LEY. OTHER AVAIL EGRAMS PRESENT STACH & RVR (>130 BPM). EF 60% (ECHO 2022). BATTERY STATUS \"12 YRS.\" AP 80%  1%. ALL AVAILABLE LEAD PARAMETERS WITHIN NORMAL LIMITS. NC     Cardiac catheterization  Result date: 7/20/22    Mid RCA lesion is 99% stenosed. Which was successfully stented with drug-eluting stent decreasing stenosis to 0 no complication.  LPAV lesion is 100% stenosed. This is a chronic total occlusion. We tried to pass the wire. Wire would not cross easily. Hence it will be managed with medical therapy. It is small to medium size vessel  Mid LAD lesion is 35% stenosed.  The angioplasty was pre-stent angioplasty.  Patient's LVEDP was upper normal there was no gradient across aortic valve.    Results for orders placed during the hospital encounter of 05/03/22    NM myocardial perfusion spect (stress and/or rest)    Interpretation Summary    Abnormal pharmacologic nuclear stress test.  There is a small amount of reversible ischemia noted in the inferior wall with a summed differential score of 5.  Patient developed transient junctional rhythm during the recovery along with profound bradycardia.    Stress ECG: Up to 1.0 mm upsloping and horizontal ST depression in the inferior leads is noted. Arrhythmias during recovery:  Junctional rhythm, rare PACs, rare PVCs. The ECG was equivocal for ischemia. The stress ECG is equivocal for ischemia after pharmacologic stress, without reproduction of symptoms.    Perfusion: There is a small reversible perfusion defect that is noted in the entire inferior wall.  Summed stress score is 5 and differential score is also 5.    Stress Function: Over Left ventricular function post-stress is normal.  Calculated ejection fraction is 64%.    Perfusion Defect Conclusion: There is no evidence of transient ischemic dilation (TID).    Echo complete   Result date: 5/03/22    Left Ventricle Left " ventricular cavity size is normal. Wall thickness is mildly increased. Systolic function is normal.  Estimated ejection fraction is 60-65%. Although no diagnostic regional wall motion abnormality was identified, this possibility cannot be completely excluded on the basis of this study. Diastolic function is normal.   Right Ventricle Right ventricular cavity size is normal. Systolic function is normal. Wall thickness is normal.   Left Atrium The atrium is normal in size.   Right Atrium The atrium is normal in size.   Aortic Valve The aortic valve is trileaflet. The leaflets show mild sclerosis. The leaflets are not calcified. The leaflets exhibit normal mobility. There is no evidence of regurgitation. There is no evidence of stenosis.   Mitral Valve The mitral valve has normal structure and function. There is trace regurgitation. There is no evidence of stenosis.   Tricuspid Valve Tricuspid valve structure is normal. There is trace regurgitation. There is no evidence of stenosis. The right ventricular systolic pressure is normal.   Pulmonic Valve Pulmonic valve structure is normal. There is trace regurgitation. There is no evidence of stenosis.   Ascending Aorta The aortic root is normal in size.   IVC/SVC The inferior vena cava is normal in size.   Pericardium There is no pericardial effusion.  Pericardial fat pad is noted. The pericardium is normal in appearance.       Imaging:  Chest X-Ray:   No Chest XR results available for this patient.    CT-scan of the chest:     No CTA results available for this patient.  Lab Review   Lab Results   Component Value Date    WBC 6.7 06/12/2023    HGB 15.6 06/12/2023    HCT 46.1 06/12/2023    MCV 88 06/12/2023    RDW 13.1 06/12/2023     06/12/2023     BMP:  Lab Results   Component Value Date    SODIUM 139 06/12/2023    K 4.1 06/12/2023     06/12/2023    CO2 23 06/12/2023    BUN 11 06/12/2023    CREATININE 1.00 06/12/2023    GLUC 106 (H) 06/12/2023    GLUF 111 (H)  "08/04/2022    CALCIUM 8.6 08/05/2022    EGFR 86 06/12/2023     Troponins:    LFT:  Lab Results   Component Value Date    AST 23 06/12/2023    ALT 33 06/12/2023    TP 7.0 06/12/2023    ALB 4.6 06/12/2023      No components found for: \"TSH3\"  No results found for: \"[id number]\"  No results found for: \"HGBA1C\"  Lipid Profile:   Lab Results   Component Value Date    CHOLESTEROL 154 06/12/2023    HDL 52 06/12/2023    LDLCALC 74 06/12/2023    TRIG 164 (H) 06/12/2023     Lab Results   Component Value Date    CHOLESTEROL 154 06/12/2023    CHOLESTEROL 227 (H) 03/10/2022     No results found for: \"CKTOTAL\", \"CKMB\", \"CKMBINDEX\", \"TROPONINI\"  No results found for: \"NTBNP\"   No results found for this or any previous visit (from the past 672 hour(s)).          Kell Newberry PA-C  "

## 2024-03-11 ENCOUNTER — IN-CLINIC DEVICE VISIT (OUTPATIENT)
Dept: CARDIOLOGY CLINIC | Facility: CLINIC | Age: 59
End: 2024-03-11
Payer: COMMERCIAL

## 2024-03-11 DIAGNOSIS — Z95.0 CARDIAC PACEMAKER IN SITU: Primary | ICD-10-CM

## 2024-03-11 PROCEDURE — 93280 PM DEVICE PROGR EVAL DUAL: CPT | Performed by: INTERNAL MEDICINE

## 2024-03-11 NOTE — PROGRESS NOTES
Results for orders placed or performed in visit on 03/11/24   Cardiac EP device report    Narrative    MDT DUAL PM/ACTIVE SYSTEM IS MRI CONDITIONAL  DEVICE INTERROGATED IN THE [place] OFFICE: BATTERY VOLTAGE ADEQUATE-12 YRS. AP 96%  2%. ALL AVAILABLE LEAD PARAMETERS & TRENDS WITHIN NORMAL LIMITS. 1 STACH NOTED@ 158 BPM. EF 60% (ECHO 2022). PT ON METO TART & ELIQUIS. NO PROGRAMMING CHANGES MADE TO DEVICE PARAMETERS. NORMAL DEVICE FUNCTION. NC

## 2024-03-22 DIAGNOSIS — Z00.6 ENCOUNTER FOR EXAMINATION FOR NORMAL COMPARISON OR CONTROL IN CLINICAL RESEARCH PROGRAM: ICD-10-CM

## 2024-03-24 DIAGNOSIS — E78.5 DYSLIPIDEMIA: ICD-10-CM

## 2024-03-25 RX ORDER — ROSUVASTATIN CALCIUM 20 MG/1
TABLET, COATED ORAL
Qty: 90 TABLET | Refills: 0 | Status: SHIPPED | OUTPATIENT
Start: 2024-03-25

## 2024-03-27 ENCOUNTER — OFFICE VISIT (OUTPATIENT)
Dept: CARDIOLOGY CLINIC | Facility: CLINIC | Age: 59
End: 2024-03-27
Payer: COMMERCIAL

## 2024-03-27 VITALS
HEART RATE: 83 BPM | HEIGHT: 69 IN | OXYGEN SATURATION: 96 % | DIASTOLIC BLOOD PRESSURE: 80 MMHG | BODY MASS INDEX: 41.18 KG/M2 | SYSTOLIC BLOOD PRESSURE: 130 MMHG | WEIGHT: 278 LBS

## 2024-03-27 DIAGNOSIS — Z95.0 CARDIAC PACEMAKER IN SITU: Primary | ICD-10-CM

## 2024-03-27 DIAGNOSIS — E66.01 CLASS 3 SEVERE OBESITY WITH BODY MASS INDEX (BMI) OF 40.0 TO 44.9 IN ADULT, UNSPECIFIED OBESITY TYPE, UNSPECIFIED WHETHER SERIOUS COMORBIDITY PRESENT (HCC): ICD-10-CM

## 2024-03-27 DIAGNOSIS — R60.0 BILATERAL LOWER EXTREMITY EDEMA: ICD-10-CM

## 2024-03-27 DIAGNOSIS — I48.0 PAROXYSMAL A-FIB (HCC): ICD-10-CM

## 2024-03-27 PROCEDURE — 99214 OFFICE O/P EST MOD 30 MIN: CPT | Performed by: PHYSICIAN ASSISTANT

## 2024-03-27 PROCEDURE — 93000 ELECTROCARDIOGRAM COMPLETE: CPT | Performed by: PHYSICIAN ASSISTANT

## 2024-03-27 NOTE — PROGRESS NOTES
Progress Note - Cardiology Office  [facility]    Benjie Ellis 59 y.o. male MRN: [id number]  : 1965  Encounter: [id number]      Assessment:     Paroxysmal atrial fibrillation.  Coronary artery disease.  Sick sinus syndrome s/p dual-chamber PPM.  Dyslipidemia.  Bilateral lower extremity edema.  Class III obesity.  Suspected obstructive sleep apnea.      Discussion Summary and Plan:    Paroxysmal atrial fibrillation.  - 3/27/24 EKG: Atrial paced rhythm, 83 bpm.  Inferior infarct, noted on or before 22.   - 3/11/24 cardiac EP device report: MDT dual PM/active system is MRI conditional.  Battery voltage adequate, 12 years.  AP 96%,  2%.  All available lead parameters and trends within normal limits.  1 Stach noted at 158 bpm.  No programming changes made to device parameters.  Normal device function.  - 24 cardiac EP device report: MDT dual PM/active system is MRI conditional.  Patient in A-fib since last night.  Other available egrams present sinus tachycardia and RVR greater than 130 bpm.  Battery status, 12 years.  AP 80%,  1%.  All available lead parameters within normal limits.  - 24: Interrogated PPM at bedside.  Interrogation notes 50 episodes of fast a and F as well as 5 episodes of AT/AF.  Longest episode of AT/AF on 24, duration 8 hours and 44 minutes.  - Continue Lopressor 12.5 mg twice daily.  - Currently on Eliquis 5 mg twice daily.  - [id number] stroke risk score: 0 points, low risk.  - Referral made to sleep medicine due to concern for suspected obstructive sleep apnea.    Coronary artery disease.  - Stable.  Patient denies experiencing chest pain.  - s/p PCI with KADEEM of mid RCA on 22.  - 22 cardiac catheterization: Mid RCA lesion is 99% stenosed. Which was successfully stented with drug-eluting stent decreasing stenosis to 0 no complication. LPAV lesion is 100% stenosed. This is a chronic total occlusion. We tried to pass the wire.  Wire would not cross easily. Hence it will be managed with medical therapy. It is small to medium size vessel Mid LAD lesion is 35% stenosed. The angioplasty was pre-stent angioplasty. Patient's LVEDP was upper normal there was no gradient across aortic valve.  - Continue Plavix 75 mg daily.  - Continue Crestor 20 mg daily.  - Continue Lopressor 12.5 mg twice daily.     Sick sinus syndrome s/p dual-chamber PPM.  - s/p dual-chamber PPM ([employer]tronic) on 8/04/22.   - 3/11/24 cardiac EP device report: MDT dual PM/active system is MRI conditional.  Battery voltage adequate, 12 years.  AP 96%,  2%.  All available lead parameters and trends within normal limits.  1 Stach noted at 158 bpm.  No programming changes made to device parameters.  Normal device function.  - 1/25/24 cardiac EP device report: MDT dual PM/active system is MRI conditional.  Patient in A-fib since last night.  Other available egrams present sinus tachycardia and RVR greater than 130 bpm.  Battery status, 12 years.  AP 80%,  1%.  All available lead parameters within normal limits.    Dyslipidemia.  - Continue Crestor 20 mg once daily.  - 6/12/23 lipid panel: 154, triglycerides 164, HDL 52, LDL 74.  - Discussed with patient recommendations for low carbohydrate diet.    Bilateral lower extremity edema.  - Patient reports noting mild bilateral lower extremity edema in recent months.  He states that he typically notices it after sitting at work for prolonged period of time.  He denies recent weight gain, states that he has lost weight.  He also denies experiencing chest pain, shortness of breath at rest or with exertion, orthopnea.  - Repeat TTE ordered.   - 5/03/22 TTE: LVEF 60-65%. Diastolic function is normal.  Mitral valve with trace regurgitation.  Tricuspid valve with trace regurgitation.  Right ventricle systolic pressure normal.  - Recommend bilateral lower compression stockings, low-sodium diet, and leg elevation.    Class III obesity.  - BMI  41.05 kg/m2.   - Patient states he is actively trying to lose weight but would like to speak with weight management team.  - Referral made to [facility] weight management.    Suspected obstructive sleep apnea.  - Review of chart documents concern for obstructive sleep apnea in the past, as far as 2022.  - Referral to sleep medicine ordered on 1/29/24.  It appears patient has a scheduled appointment with [facility] sleep medicine on 8/27/24.        Patient / Caretaker was advised and educated to call our office  immediately if  patient has any new symptoms of chest pain/shortness of breath, near-syncope, syncope, light headedness sustained palpitations  or any other cardiovascular symptoms before their scheduled follow-up appointment.  Office number was provided #[phone].  Please call [phone] if any questions.  Counseling :  A description of the counseling.  Goals and Barriers.  Patient's ability to self care: Yes  Medication side effect reviewed with patient in detail and all their questions answered to their satisfaction.    HPI :     Benjie Ellis is a 59 y.o. male with PMHx of paroxysmal atrial fibrillation (on Eliquis), CAD s/p PCI with KADEEM of mid RCA (7/20/22), SSS s/p dual-chamber PPM ([employer]tr[employer], 8/04/22), dyslipidemia, who presents for routine outpatient cardiology follow-up.    3/27/24:        Patient was last seen in outpatient cardiology office on 1/29/24.  During last office visit patient was diagnosed with paroxysmal atrial fibrillation after cardiac EP device report noted new onset atrial fibrillation on evening of 1/24/24.  Patient had endorsed experiencing palpitations.  Patient was started on Eliquis 5 mg twice daily and Lopressor 12.5 mg twice daily.     3/11/24 cardiac EP device report noted 1 sinus tach episode noted at 158 bpm.  Since last office visit patient reports overall he is doing well.  He denies experiencing any episodes of palpitations. Patient does report noting mild  bilateral lower extremity edema in recent months.  He states that he typically notices it after sitting at work for prolonged period of time.  He denies recent weight gain, states that he has lost weight.  He also denies experiencing chest pain, shortness of breath at rest or with exertion, orthopnea.          1/29/24:         Patient presents for evaluation after cardiac EP device report from 1/25/24 indicated patient was in new onset atrial fibrillation on evening of 1/24/24.     Patient's primary cardiologist, Dr. Espinoza, was notified of 1/25/2024 cardiac EP device report which indicated patient was in new onset atrial fibrillation on evening of 1/24/24.  Patient was started on Eliquis 5 mg twice daily.      Patient states that he noted having palpitations and mild shortness of breath on the evening of 1/24/24.  He denies any additional symptoms including chest pain, lightheadedness, dizziness, headache, nausea, vomiting or diaphoresis.  He denies having any additional episodes since the evening of 1/24/24.       Historical Information   Past Medical History:   Diagnosis Date    Hyperlipidemia     Irregular heart beat     bradycardia post stress test, holter monitor on until 05/05/22 Dr Espinoza    Kidney stone      Past Surgical History:   Procedure Laterality Date    BICEPS TENDON REPAIR Bilateral     CARDIAC CATHETERIZATION N/A 7/20/2022    Procedure: Cardiac Coronary Angiogram;  Surgeon: Mercedez Espinoza MD;  Location: [facility];  Service: Cardiology    CARDIAC CATHETERIZATION Left 7/20/2022    Procedure: Cardiac Left Heart Cath;  Surgeon: Mercedez Espinoza MD;  Location: [facility];  Service: Cardiology    CARDIAC CATHETERIZATION N/A 7/20/2022    Procedure: Cardiac pci;  Surgeon: Mercedez Espinoza MD;  Location: [facility];  Service: Cardiology    CARDIAC ELECTROPHYSIOLOGY PROCEDURE N/A 8/4/2022    Procedure: Cardiac pacer implant;  Surgeon: Mercedez Espinoza MD;  Location: [facility]  "CATH LAB;  Service: Cardiology    INGUINAL HERNIA REPAIR      LASIK Bilateral     ROTATOR CUFF REPAIR Right     VASECTOMY       Social History     Substance and Sexual Activity   Alcohol Use Not Currently    Comment: rare     Social History     Substance and Sexual Activity   Drug Use Not Currently    Types: Marijuana     Social History     Tobacco Use   Smoking Status Never   Smokeless Tobacco Never     Family History:   Family History   Problem Relation Age of Onset    Hypertension Mother     Diabetes Mother     Hypertension Father     Colon polyps Father     Mental illness Neg Hx        Meds/Allergies     No Known Allergies    Current Outpatient Medications:     apixaban (Eliquis) 5 mg, Take 1 tablet (5 mg total) by mouth 2 (two) times a day, Disp: 180 tablet, Rfl: 0    clopidogrel (PLAVIX) 75 mg tablet, Take 1 tablet by mouth once daily, Disp: 90 tablet, Rfl: 0    metoprolol tartrate (LOPRESSOR) 25 mg tablet, Take 0.5 tablets (12.5 mg total) by mouth every 12 (twelve) hours, Disp: 30 tablet, Rfl: 3    rosuvastatin (CRESTOR) 20 MG tablet, Take 1 tablet by mouth once daily, Disp: 90 tablet, Rfl: 0    Vitals: Blood pressure 130/80, pulse 83, height 5' 9\" (1.753 m), weight 126 kg (278 lb), SpO2 96%.    Body mass index is 41.05 kg/m².  Wt Readings from Last 3 Encounters:   03/27/24 126 kg (278 lb)   01/29/24 127 kg (279 lb)   12/13/23 125 kg (275 lb)     Vitals:    03/27/24 1124   Weight: 126 kg (278 lb)       BP Readings from Last 3 Encounters:   03/27/24 130/80   01/29/24 128/72   12/13/23 130/70       Physical Exam:  Physical Exam  Vitals reviewed.   Constitutional:       General: He is not in acute distress.     Appearance: He is obese.   Cardiovascular:      Rate and Rhythm: Normal rate and regular rhythm.      Pulses: Normal pulses.      Heart sounds: Murmur heard.   Pulmonary:      Effort: Pulmonary effort is normal. No respiratory distress.      Breath sounds: Normal breath sounds.   Abdominal:      General: " "Abdomen is flat.      Palpations: Abdomen is soft.      Tenderness: There is no abdominal tenderness.   Musculoskeletal:      Right lower leg: Edema (trace) present.      Left lower leg: Edema (trace) present.   Skin:     General: Skin is warm and dry.   Neurological:      Mental Status: He is alert and oriented to person, place, and time.         Diagnostic Studies Review Cardio:      EKG:    3/27/24 EKG: Atrial paced rhythm, 83 bpm.  Inferior infarct, noted on or before 7/20/22.     Cardiac testing:     Cardiac EP device report    Result Date: 1/25/2024  Narrative: MDT DUAL PM/ACTIVE SYSTEM IS MRI CONDITIONAL 2 DIFFERENT TRANSMISSION INCLUDED. NB/ALERT-CARELINK TRANSMISSION: PT IN AFIB SINCE LAST NIGHT. NO AC NOTED; ONLY TAKING PLAVIX; STATES HE'S BEEN SOB & HAVING PALPITATIONS. SENT TO DR. LEY. OTHER AVAIL EGRAMS PRESENT STACH & RVR (>130 BPM). EF 60% (ECHO 2022). BATTERY STATUS \"12 YRS.\" AP 80%  1%. ALL AVAILABLE LEAD PARAMETERS WITHIN NORMAL LIMITS. NC     Cardiac catheterization  Result date: 7/20/22    Mid RCA lesion is 99% stenosed. Which was successfully stented with drug-eluting stent decreasing stenosis to 0 no complication.  LPAV lesion is 100% stenosed. This is a chronic total occlusion. We tried to pass the wire. Wire would not cross easily. Hence it will be managed with medical therapy. It is small to medium size vessel  Mid LAD lesion is 35% stenosed.  The angioplasty was pre-stent angioplasty.  Patient's LVEDP was upper normal there was no gradient across aortic valve.    Results for orders placed during the hospital encounter of 05/03/22    NM myocardial perfusion spect (stress and/or rest)    Interpretation Summary    Abnormal pharmacologic nuclear stress test.  There is a small amount of reversible ischemia noted in the inferior wall with a summed differential score of 5.  Patient developed transient junctional rhythm during the recovery along with profound bradycardia.    Stress ECG: Up to 1.0 " mm upsloping and horizontal ST depression in the inferior leads is noted. Arrhythmias during recovery:  Junctional rhythm, rare PACs, rare PVCs. The ECG was equivocal for ischemia. The stress ECG is equivocal for ischemia after pharmacologic stress, without reproduction of symptoms.    Perfusion: There is a small reversible perfusion defect that is noted in the entire inferior wall.  Summed stress score is 5 and differential score is also 5.    Stress Function: Over Left ventricular function post-stress is normal.  Calculated ejection fraction is 64%.    Perfusion Defect Conclusion: There is no evidence of transient ischemic dilation (TID).    Echo complete   Result date: 5/03/22    Left Ventricle Left ventricular cavity size is normal. Wall thickness is mildly increased. Systolic function is normal.  Estimated ejection fraction is 60-65%. Although no diagnostic regional wall motion abnormality was identified, this possibility cannot be completely excluded on the basis of this study. Diastolic function is normal.   Right Ventricle Right ventricular cavity size is normal. Systolic function is normal. Wall thickness is normal.   Left Atrium The atrium is normal in size.   Right Atrium The atrium is normal in size.   Aortic Valve The aortic valve is trileaflet. The leaflets show mild sclerosis. The leaflets are not calcified. The leaflets exhibit normal mobility. There is no evidence of regurgitation. There is no evidence of stenosis.   Mitral Valve The mitral valve has normal structure and function. There is trace regurgitation. There is no evidence of stenosis.   Tricuspid Valve Tricuspid valve structure is normal. There is trace regurgitation. There is no evidence of stenosis. The right ventricular systolic pressure is normal.   Pulmonic Valve Pulmonic valve structure is normal. There is trace regurgitation. There is no evidence of stenosis.   Ascending Aorta The aortic root is normal in size.   IVC/SVC The inferior  "vena cava is normal in size.   Pericardium There is no pericardial effusion.  Pericardial fat pad is noted. The pericardium is normal in appearance.       Lab Review   Lab Results   Component Value Date    WBC 6.7 06/12/2023    HGB 15.6 06/12/2023    HCT 46.1 06/12/2023    MCV 88 06/12/2023    RDW 13.1 06/12/2023     06/12/2023     BMP:  Lab Results   Component Value Date    SODIUM 139 06/12/2023    K 4.1 06/12/2023     06/12/2023    CO2 23 06/12/2023    BUN 11 06/12/2023    CREATININE 1.00 06/12/2023    GLUC 106 (H) 06/12/2023    GLUF 111 (H) 08/04/2022    CALCIUM 8.6 08/05/2022    EGFR 86 06/12/2023     Troponins:    LFT:  Lab Results   Component Value Date    AST 23 06/12/2023    ALT 33 06/12/2023    TP 7.0 06/12/2023    ALB 4.6 06/12/2023      No components found for: \"TSH3\"  No results found for: \"[id number]\"  No results found for: \"HGBA1C\"  Lipid Profile:   Lab Results   Component Value Date    CHOLESTEROL 154 06/12/2023    HDL 52 06/12/2023    LDLCALC 74 06/12/2023    TRIG 164 (H) 06/12/2023       Kell Newberry PA-C  "

## 2024-04-01 DIAGNOSIS — I25.10 CORONARY ARTERY DISEASE INVOLVING NATIVE CORONARY ARTERY OF NATIVE HEART WITHOUT ANGINA PECTORIS: ICD-10-CM

## 2024-04-01 DIAGNOSIS — Z95.5 S/P CORONARY ARTERY STENT PLACEMENT: ICD-10-CM

## 2024-04-03 RX ORDER — CLOPIDOGREL BISULFATE 75 MG/1
75 TABLET ORAL DAILY
Qty: 90 TABLET | Refills: 3 | Status: SHIPPED | OUTPATIENT
Start: 2024-04-03

## 2024-04-10 ENCOUNTER — TELEPHONE (OUTPATIENT)
Dept: CARDIOLOGY CLINIC | Facility: CLINIC | Age: 59
End: 2024-04-10

## 2024-04-10 ENCOUNTER — HOSPITAL ENCOUNTER (OUTPATIENT)
Dept: NON INVASIVE DIAGNOSTICS | Facility: HOSPITAL | Age: 59
Discharge: HOME | End: 2024-04-10
Payer: COMMERCIAL

## 2024-04-10 VITALS
SYSTOLIC BLOOD PRESSURE: 134 MMHG | WEIGHT: 278 LBS | BODY MASS INDEX: 41.18 KG/M2 | HEART RATE: 94 BPM | HEIGHT: 69 IN | DIASTOLIC BLOOD PRESSURE: 85 MMHG

## 2024-04-10 DIAGNOSIS — R60.0 BILATERAL LOWER EXTREMITY EDEMA: ICD-10-CM

## 2024-04-10 LAB
AORTIC ROOT: 3.6 CM
APICAL FOUR CHAMBER EJECTION FRACTION: 68 %
BSA FOR ECHO PROCEDURE: 2.38 M2
E WAVE DECELERATION TIME: 190 MS
E/A RATIO: 1.09
FRACTIONAL SHORTENING: 38 (ref 28–44)
INTERVENTRICULAR SEPTUM IN DIASTOLE (PARASTERNAL SHORT AXIS VIEW): 1.3 CM
INTERVENTRICULAR SEPTUM: 1.3 CM (ref 0.6–1.1)
LAAS-AP2: 19.8 CM2
LAAS-AP4: 13.8 CM2
LEFT ATRIUM SIZE: 4.1 CM
LEFT ATRIUM VOLUME (MOD BIPLANE): 44 ML
LEFT ATRIUM VOLUME INDEX (MOD BIPLANE): 18.5 ML/M2
LEFT INTERNAL DIMENSION IN SYSTOLE: 2.6 CM (ref 2.1–4)
LEFT VENTRICULAR INTERNAL DIMENSION IN DIASTOLE: 4.2 CM (ref 3.5–6)
LEFT VENTRICULAR POSTERIOR WALL IN END DIASTOLE: 1.2 CM
LEFT VENTRICULAR STROKE VOLUME: 52 ML
LVSV (TEICH): 52 ML
MV E'TISSUE VEL-LAT: 15 CM/S
MV E'TISSUE VEL-SEP: 10 CM/S
MV PEAK A VEL: 0.74 M/S
MV PEAK E VEL: 81 CM/S
MV STENOSIS PRESSURE HALF TIME: 55 MS
MV VALVE AREA P 1/2 METHOD: 4
RIGHT ATRIUM AREA SYSTOLE A4C: 17 CM2
RIGHT VENTRICLE ID DIMENSION: 2.6 CM
SL CV LEFT ATRIUM LENGTH A2C: 5.3 CM
SL CV LV EF: 65
SL CV PED ECHO LEFT VENTRICLE DIASTOLIC VOLUME (MOD BIPLANE) 2D: 77 ML
SL CV PED ECHO LEFT VENTRICLE SYSTOLIC VOLUME (MOD BIPLANE) 2D: 26 ML
TR MAX PG: 23 MMHG
TR PEAK VELOCITY: 2.4 M/S
TRICUSPID ANNULAR PLANE SYSTOLIC EXCURSION: 2.1 CM
TRICUSPID VALVE PEAK REGURGITATION VELOCITY: 2.38 M/S

## 2024-04-10 PROCEDURE — 93306 TTE W/DOPPLER COMPLETE: CPT

## 2024-04-10 PROCEDURE — 93306 TTE W/DOPPLER COMPLETE: CPT | Performed by: INTERNAL MEDICINE

## 2024-04-10 NOTE — TELEPHONE ENCOUNTER
----- Message from Kell Newberry PA-C sent at 4/10/2024 12:44 PM EDT -----  Can you please inform the patient that the results of his echocardiogram have been reviewed.  Compared to his prior echocardiogram from 5/3/2022 there has been no significant change.  Thank you.    ----- Message -----  From: Mercedez Espinoza MD  Sent: 4/10/2024  11:55 AM EDT  To: Kell Newberry PA-C

## 2024-04-17 LAB — DSDNA AB SER-ACNC: 1 IU/ML (ref 0–9)

## 2024-04-19 DIAGNOSIS — I48.91 ATRIAL FIBRILLATION, UNSPECIFIED TYPE (HCC): ICD-10-CM

## 2024-04-19 RX ORDER — APIXABAN 5 MG/1
5 TABLET, FILM COATED ORAL 2 TIMES DAILY
Qty: 180 TABLET | Refills: 1 | Status: SHIPPED | OUTPATIENT
Start: 2024-04-19

## 2024-04-30 ENCOUNTER — OFFICE VISIT (OUTPATIENT)
Dept: BARIATRICS | Facility: CLINIC | Age: 59
End: 2024-04-30
Payer: COMMERCIAL

## 2024-04-30 VITALS
DIASTOLIC BLOOD PRESSURE: 88 MMHG | SYSTOLIC BLOOD PRESSURE: 136 MMHG | HEART RATE: 82 BPM | HEIGHT: 69 IN | BODY MASS INDEX: 42.06 KG/M2 | WEIGHT: 284 LBS

## 2024-04-30 DIAGNOSIS — I25.10 CORONARY ARTERY DISEASE INVOLVING NATIVE CORONARY ARTERY OF NATIVE HEART WITHOUT ANGINA PECTORIS: ICD-10-CM

## 2024-04-30 DIAGNOSIS — E78.2 MIXED HYPERLIPIDEMIA: ICD-10-CM

## 2024-04-30 DIAGNOSIS — Z95.5 S/P CORONARY ARTERY STENT PLACEMENT: ICD-10-CM

## 2024-04-30 DIAGNOSIS — E66.01 CLASS 3 SEVERE OBESITY WITH BODY MASS INDEX (BMI) OF 40.0 TO 44.9 IN ADULT, UNSPECIFIED OBESITY TYPE, UNSPECIFIED WHETHER SERIOUS COMORBIDITY PRESENT (HCC): Primary | ICD-10-CM

## 2024-04-30 DIAGNOSIS — I10 ESSENTIAL HYPERTENSION: ICD-10-CM

## 2024-04-30 PROBLEM — E66.813 CLASS 3 SEVERE OBESITY WITH BODY MASS INDEX (BMI) OF 40.0 TO 44.9 IN ADULT (HCC): Status: ACTIVE | Noted: 2024-04-30

## 2024-04-30 PROCEDURE — 99204 OFFICE O/P NEW MOD 45 MIN: CPT | Performed by: NURSE PRACTITIONER

## 2024-04-30 NOTE — PROGRESS NOTES
Assessment/Plan:    Class 3 severe obesity with body mass index (BMI) of 40.0 to 44.9 in adult (HCC)  - Discussed options of HealthyCORE-Intensive Lifestyle Intervention Program, Very Low Calorie Diet-VLCD, Conservative Program, Zamzam-En-Y Gastric Bypass, and Vertical Sleeve Gastrectomy and the role of weight loss medications.  - Explained the importance of making lifestyle changes in addition to starting any anti-obesity medications.   - Patient is interested in pursuing Conservative Program and follow up visits with medical weight management provider.  - Initial weight loss goal of 5-10% weight loss for improved health  - Weight loss can improve patient's co-morbid conditions and/or prevent weight-related complications.  - Weight is not at goal and patient has been unable to achieve a meaningful weight loss above 5% using various programs and tools for more than 6 months  - Labs reviewed 6/2023  - Patient lifestyle habits were reviewed and barriers to weight loss were reviewed. Nutrition was discussed and patient was encouraged to meet with the dietician but declined at this time. Patient states he doesn't think a program would help him, he needs help with hunger. He was provided a meal plan to use as a guide to start to incorporate meals earlier in his day and limit his portions at night.   Activity was discussed and patient was advised to start with mild activity 30 minutes 2-3 days per week.    Medications were dicussed:  Phentermine to be avoided due to heart disease.   Topiramate discussed and patient denies any history of kidney stones  Bupropion/naltrexone was discussed and would avoid due to patient's history of arrhythmia  GLP-1 medications were discussed and patient would like to consider. Patient will find out if medications are covered under his insurance plan. Patient will also get a hemoglobin A1C to see if he is diabetic to see if he would qualify for diabetes GLP-1.    Goals:  Calorie Goal:  7751-9653 calories per day  Do not skip meals.  Food log via brenda or provided paper log (brenda options include www.AdviceIQ.com, sparkpeople.com, loseit.com, calorieking.com, bariWorlize)  No sugary beverages.   At least 64oz of water daily.  Increase physical activity by 10 minutes daily. Gradually increase physical activity to a goal of 5 days per week for 30 minutes of MODERATE intensity PLUS 2 days per week of FULL BODY resistance training     Hyperlipidemia  Treatment includes rosuvastatin 20 mg    Coronary artery disease involving native coronary artery  Patient will inquire if Wegovy may be covered for heart disease if not covered for obesity         Benjie was seen today for consult.    Diagnoses and all orders for this visit:    Class 3 severe obesity with body mass index (BMI) of 40.0 to 44.9 in adult, unspecified obesity type, unspecified whether serious comorbidity present (HCC)  -     Ambulatory Referral to Weight Management    Essential hypertension  -     Hemoglobin A1C; Future  -     Hemoglobin A1C    S/P coronary artery stent placement    Coronary artery disease involving native coronary artery of native heart without angina pectoris  -     Hemoglobin A1C; Future  -     Hemoglobin A1C    Mixed hyperlipidemia       Patient denies personal history of pancreatitis. Patient also denies personal and family history of medullary thyroid cancer and multiple endocrine neoplasia type 2 (MEN 2 tumor). Patient denies any history of kidney stones, seizures, or glaucoma.     Total time spent reviewing chart, interviewing patient, examining patient, discussing plan, answering all questions, and documentin min, with >50% face-to-face time spent counseling patient on nonsurgical interventions for the treatment of excess weight. Discussed in detail nonsurgical options including intensive lifestyle intervention program, very low-calorie diet program and conservative program.  Discussed the role of weight loss  medications.  Counseled patient on diet behavior and exercise modification for weight loss.    Follow up in approximately 2 months with Non-Surgical Physician/Advanced Practitioner.    Subjective:   Chief Complaint   Patient presents with    Consult     Pt is here today for MWM consult.        Patient ID: Benjie Ellis  is a 59 y.o. male with excess weight/obesity here to pursue weight management.  Previous notes and records have been reviewed.    Past Medical History:   Diagnosis Date    Hyperlipidemia     Irregular heart beat     bradycardia post stress test, holter monitor on until 05/05/22 Dr Espinoza    Kidney stone      Past Surgical History:   Procedure Laterality Date    BICEPS TENDON REPAIR Bilateral     CARDIAC CATHETERIZATION N/A 07/20/2022    Procedure: Cardiac Coronary Angiogram;  Surgeon: Mercedez Espinoza MD;  Location: [facility];  Service: Cardiology    CARDIAC CATHETERIZATION Left 07/20/2022    Procedure: Cardiac Left Heart Cath;  Surgeon: Mercedez Espinoza MD;  Location: [facility];  Service: Cardiology    CARDIAC CATHETERIZATION N/A 07/20/2022    Procedure: Cardiac pci;  Surgeon: Mercedez Espinoza MD;  [facility]: [facility];  Service: Cardiology    CARDIAC ELECTROPHYSIOLOGY PROCEDURE N/A 08/04/2022    Procedure: Cardiac pacer implant;  Surgeon: Mercedez Espinoza MD;  Location: [facility];  Service: Cardiology    CARDIAC PACEMAKER PLACEMENT  08/04/2022    INGUINAL HERNIA REPAIR      LASIK Bilateral     ROTATOR CUFF REPAIR Right     VASECTOMY         HPI:  Wt Readings from Last 20 Encounters:   04/30/24 129 kg (284 lb)   04/10/24 126 kg (278 lb)   03/27/24 126 kg (278 lb)   01/29/24 127 kg (279 lb)   12/13/23 125 kg (275 lb)   09/07/23 123 kg (271 lb 2.7 oz)   08/22/23 123 kg (272 lb)   05/26/23 122 kg (269 lb 6 oz)   05/16/23 123 kg (272 lb)   11/14/22 122 kg (269 lb)   08/12/22 120 kg (265 lb)   08/05/22 119 kg (261 lb 14.5 oz)   07/26/22 119 kg (262 lb)   07/20/22  116 kg (255 lb 15.3 oz)   22 121 kg (266 lb)   22 119 kg (263 lb)   22 119 kg (263 lb)   22 118 kg (260 lb)   22 118 kg (260 lb)   22 113 kg (250 lb)       Patient presents today to medical weight management office for consult.  Patient was referred by his cardiologist to try to get his weight under control. Patient has tried diets in the past and was most successful with Soraya Calle when he had complete meal replacement. He has tried low carb without any success.  Patient is retired but works part time. He often eats irregularly throughout the day because of his schedule. He often eats in the afternoon and late at night, skipping meals earlier in the day.       Obesity/Excess Weight:  Severity: Severe  Onset:  10+ years    Modifiers: Diet and Exercise  Contributing factors: Poor Food Choices, Insufficient Physical Activity, Stress/Emotional Eating, Binge Eating, Lack of knowledge of appropriate lifestyle changes, and Insufficient time to make appropriate lifestyle changes  Associated symptoms: comorbid conditions, fatigue, increased joint pain, decreased exercise capacity, body image issues, decreased self esteem, increased shortness of breath, decreased mobility, depression, inability to do certain activities, and clothes do not fit    Diet recall:  B: coffee with cream  L: sandwich  D: burgers OR meatloaf OR chicken with vegetables and rice  S: leftovers OR cheese     Hydration: coffee, water -4-5 bottles, tea occasionally at night  Alcohol: no - on vacations  Smoking: no  Exercise: no  Occupation: retired (computer work)  Sleep: well  STOP ban/8    Current weight: 284 lbs  Current BMI: 41.94  Current Waist Measurement: 50.5 in  Goal weight: 1st goal 240 lbs, around 200 for ultimate         The following portions of the patient's history were reviewed and updated as appropriate: allergies, current medications, past family history, past medical history, past social history,  "past surgical history, and problem list.    Family History   Problem Relation Age of Onset    Hypertension Mother     Diabetes Mother     Hypertension Father     Colon polyps Father     Mental illness Neg Hx         Review of Systems   Constitutional:  Positive for fatigue.   HENT:  Negative for sore throat.    Respiratory:  Negative for cough and shortness of breath.    Cardiovascular:  Negative for chest pain, palpitations and leg swelling.   Gastrointestinal:  Negative for abdominal pain, constipation, diarrhea and nausea.   Genitourinary:  Negative for dysuria.   Musculoskeletal:  Negative for arthralgias and back pain.   Skin:  Negative for rash.   Neurological:  Positive for headaches.   Psychiatric/Behavioral:  Negative for dysphoric mood. The patient is not nervous/anxious.        Objective:  /88 (BP Location: Left arm, Patient Position: Sitting, Cuff Size: Large)   Pulse 82   Ht 5' 9\" (1.753 m)   Wt 129 kg (284 lb)   BMI 41.94 kg/m²     Physical Exam  Vitals and nursing note reviewed.   Constitutional:       Appearance: Normal appearance. He is obese.   HENT:      Head: Normocephalic.   Pulmonary:      Effort: Pulmonary effort is normal.   Neurological:      General: No focal deficit present.      Mental Status: He is alert and oriented to person, place, and time.   Psychiatric:         Mood and Affect: Mood normal.         Behavior: Behavior normal.         Thought Content: Thought content normal.         Judgment: Judgment normal.              Labs and Imaging  Recent labs and imaging have been personally reviewed.  Lab Results   Component Value Date    WBC 6.7 06/12/2023    HGB 15.6 06/12/2023    HCT 46.1 06/12/2023    MCV 88 06/12/2023     06/12/2023     Lab Results   Component Value Date    SODIUM 139 06/12/2023    K 4.1 06/12/2023     06/12/2023    CO2 23 06/12/2023    AGAP 4 08/05/2022    BUN 11 06/12/2023    CREATININE 1.00 06/12/2023    GLUC 106 (H) 06/12/2023    GLUF 111 (H) " "08/04/2022    CALCIUM 8.6 08/05/2022    AST 23 06/12/2023    ALT 33 06/12/2023    TP 7.0 06/12/2023    TBILI 0.8 06/12/2023    EGFR 86 06/12/2023     No results found for: \"HGBA1C\"  Lab Results   Component Value Date    TSH 3.100 06/12/2023     Lab Results   Component Value Date    CHOLESTEROL 154 06/12/2023     Lab Results   Component Value Date    HDL 52 06/12/2023     Lab Results   Component Value Date    TRIG 164 (H) 06/12/2023     Lab Results   Component Value Date    LDLCALC 74 06/12/2023     "

## 2024-05-02 NOTE — ASSESSMENT & PLAN NOTE
- Discussed options of HealthyCORE-Intensive Lifestyle Intervention Program, Very Low Calorie Diet-VLCD, Conservative Program, Zamzam-En-Y Gastric Bypass, and Vertical Sleeve Gastrectomy and the role of weight loss medications.  - Explained the importance of making lifestyle changes in addition to starting any anti-obesity medications.   - Patient is interested in pursuing Conservative Program and follow up visits with medical weight management provider.  - Initial weight loss goal of 5-10% weight loss for improved health  - Weight loss can improve patient's co-morbid conditions and/or prevent weight-related complications.  - Weight is not at goal and patient has been unable to achieve a meaningful weight loss above 5% using various programs and tools for more than 6 months  - Labs reviewed 6/2023  - Patient lifestyle habits were reviewed and barriers to weight loss were reviewed. Nutrition was discussed and patient was encouraged to meet with the dietician but declined at this time. Patient states he doesn't think a program would help him, he needs help with hunger. He was provided a meal plan to use as a guide to start to incorporate meals earlier in his day and limit his portions at night.   Activity was discussed and patient was advised to start with mild activity 30 minutes 2-3 days per week.    Medications were dicussed:  Phentermine to be avoided due to heart disease.   Topiramate discussed and patient denies any history of kidney stones  Bupropion/naltrexone was discussed and would avoid due to patient's history of arrhythmia  GLP-1 medications were discussed and patient would like to consider. Patient will find out if medications are covered under his insurance plan. Patient will also get a hemoglobin A1C to see if he is diabetic to see if he would qualify for diabetes GLP-1.    Goals:  Calorie Goal: 7804-6190 calories per day  Do not skip meals.  Food log via brenda or provided paper log (brenda options include  www.[employer] Toy Companynesspal.com, [employer].com, loseit.com, [employer].com, baritastic)  No sugary beverages.   At least 64oz of water daily.  Increase physical activity by 10 minutes daily. Gradually increase physical activity to a goal of 5 days per week for 30 minutes of MODERATE intensity PLUS 2 days per week of FULL BODY resistance training

## 2024-05-06 ENCOUNTER — TELEPHONE (OUTPATIENT)
Dept: BARIATRICS | Facility: CLINIC | Age: 59
End: 2024-05-06

## 2024-05-06 DIAGNOSIS — E66.01 CLASS 3 SEVERE OBESITY WITH BODY MASS INDEX (BMI) OF 40.0 TO 44.9 IN ADULT, UNSPECIFIED OBESITY TYPE, UNSPECIFIED WHETHER SERIOUS COMORBIDITY PRESENT (HCC): Primary | ICD-10-CM

## 2024-05-06 NOTE — TELEPHONE ENCOUNTER
Please see MEDIA section of chart for PA request.     Medication  Semaglutide-Weight Management (WEGOVY) 0.25 MG/0.5ML [[id number]]  Semaglutide-Weight Management (WEGOVY) 0.25 MG/0.5ML [[id number]]    Order Details  Dose: 0.25 mg Route: Subcutaneous Frequency: Weekly   Dispense Quantity: 2 mL Refills: 0          Sig: Inject 0.5 mL (0.25 mg total) under the skin once a week         Start Date: 05/06/24 End Date: 06/05/24 after 5 doses   Written Date: 05/06/24 Expiration Date: 05/06/25       Associated Diagnoses: Class 3 severe obesity with body mass index (BMI) of 40.0 to 44.9 in adult, unspecified obesity type, unspecified whether serious comorbidity present (HCC) [E66.01, Z68.41]   Providers    Authorizing Provider:  DRU Singh  [address] [address]  Phone: [phone]   Fax: [phone]  TIERRA #: [id number]   NPI: [id number]        Ordering User: DRU Singh          Pharmacy    [facility] [address] [place] [address] [address]  [address] [place] [address]  Phone: [phone]  Fax: [phone]  TIERRA #: --

## 2024-05-07 ENCOUNTER — TELEPHONE (OUTPATIENT)
Dept: BARIATRICS | Facility: CLINIC | Age: 59
End: 2024-05-07

## 2024-05-07 NOTE — TELEPHONE ENCOUNTER
PA for Wegovy    Submitted via    []CMM-KEY   [x]Júniorrijay jay-Case ID #   []Faxed to plan   []Other website   []Phone call Case ID #     Office notes sent, clinical questions answered. Awaiting determination    Turnaround time for your insurance to make a decision on your Prior Authorization can take 7-21 business days.

## 2024-05-09 NOTE — TELEPHONE ENCOUNTER
Duplicate PA encounter please see telephone encounter from 5/6/24 regarding wegovy PA. Please review patient's chart to see status of PA and to document before creating another encounter Thank You.

## 2024-05-15 NOTE — TELEPHONE ENCOUNTER
Please follow up that the PA was answered appropriately, this is not an in office injectable medication

## 2024-05-17 DIAGNOSIS — I48.0 PAROXYSMAL A-FIB (HCC): ICD-10-CM

## 2024-05-22 NOTE — TELEPHONE ENCOUNTER
PA for wegovy resubmitted under other insurance plan    Submitted via    []CMM-KEY  [id number]  []Surescripts-Case ID #   []Faxed to plan   []Other website   []Phone call Case ID #     Office notes sent, clinical questions answered. Awaiting determination    Turnaround time for your insurance to make a decision on your Prior Authorization can take 7-21 business days.

## 2024-05-25 NOTE — TELEPHONE ENCOUNTER
PA for Wegovy 0.25mg EXCLUDED from plan       Reason:(Screenshot if applicable)        Message sent to office clinical pool Yes

## 2024-06-17 DIAGNOSIS — E78.5 DYSLIPIDEMIA: ICD-10-CM

## 2024-06-17 RX ORDER — ROSUVASTATIN CALCIUM 20 MG/1
TABLET, COATED ORAL
Qty: 90 TABLET | Refills: 1 | Status: SHIPPED | OUTPATIENT
Start: 2024-06-17

## 2024-06-20 ENCOUNTER — REMOTE DEVICE CLINIC VISIT (OUTPATIENT)
Dept: CARDIOLOGY CLINIC | Facility: CLINIC | Age: 59
End: 2024-06-20
Payer: COMMERCIAL

## 2024-06-20 DIAGNOSIS — Z95.0 PRESENCE OF PERMANENT CARDIAC PACEMAKER: Primary | ICD-10-CM

## 2024-06-20 PROCEDURE — 93296 REM INTERROG EVL PM/IDS: CPT | Performed by: INTERNAL MEDICINE

## 2024-06-20 PROCEDURE — 93294 REM INTERROG EVL PM/LDLS PM: CPT | Performed by: INTERNAL MEDICINE

## 2024-06-20 NOTE — PROGRESS NOTES
Results for orders placed or performed in visit on 06/20/24   Cardiac EP device report    Narrative    MDT DUAL PM/ACTIVE SYSTEM IS MRI CONDITIONAL  CARELINK TRANSMISSION: BATTERY VOLTAGE ADEQUATE. (11.7 YRS) AP 98%  3%. ALL AVAILABLE LEAD PARAMETERS WITHIN NORMAL LIMITS. 1 VHR EPISODE DETECTED. 1 FAST A & V EPISODE DETECTED. ALL EGMS SHOW ST. NORMAL DEVICE FUNCTION.---SANDOVAL

## 2024-07-02 ENCOUNTER — OFFICE VISIT (OUTPATIENT)
Dept: BARIATRICS | Facility: CLINIC | Age: 59
End: 2024-07-02
Payer: COMMERCIAL

## 2024-07-02 VITALS
SYSTOLIC BLOOD PRESSURE: 118 MMHG | BODY MASS INDEX: 41.59 KG/M2 | HEART RATE: 78 BPM | HEIGHT: 69 IN | WEIGHT: 280.8 LBS | DIASTOLIC BLOOD PRESSURE: 88 MMHG

## 2024-07-02 DIAGNOSIS — E66.01 CLASS 3 SEVERE OBESITY DUE TO EXCESS CALORIES WITH SERIOUS COMORBIDITY AND BODY MASS INDEX (BMI) OF 40.0 TO 44.9 IN ADULT (HCC): Primary | ICD-10-CM

## 2024-07-02 PROCEDURE — 99214 OFFICE O/P EST MOD 30 MIN: CPT | Performed by: NURSE PRACTITIONER

## 2024-07-02 NOTE — ASSESSMENT & PLAN NOTE
- Patient is pursuing Conservative Program and follow up visits with medical weight management provider  - Initial weight loss goal of 5-10% weight loss for improved health. Weight loss can improve patient's co-morbid conditions and/or prevent weight-related complications.  - Explained the importance of continuing lifestyle changes in addition to any anti-obesity medications.   - Labs reviewed from 6/2023    General Recommendations:  Nutrition:  Eat breakfast daily.  Do not skip meals.      Food log (ie.) www.Dayak.com, sparkpeople.com, loseit.com, calorieking.com, etc.     Practice mindful eating.  Be sure to set aside time to eat, eat slowly, and savor your food.     Hydration:    At least 64oz of water daily.  No sugar sweetened beverages.  No juice (eat the fruit instead).     Exercise:  Studies have shown that the ideal exercise goal is somewhere between 150 to 300 minutes of moderate intensity exercise a week.  Start with exercising 10 minutes every other day and gradually increase physical activity with a goal of at least 150 minutes of moderate intensity exercise a week, divided over at least 3 days a week.  An example of this would be exercising 30 minutes a day, 5 days a week.  Resistance training can increase muscle mass and increase our resting metabolic rate.   FULL BODY resistance training is recommended 2-3 times a week.  Do not do this on consecutive days to allow for muscle recovery.     Aim for a bare minimum 5000 steps, even on days you do not exercise.     Monitoring:   Weigh yourself daily.  If this causes undue stress, then just weigh yourself once a week.  Weigh yourself the same time of the day with the same amount of clothing on.  Preferably this should be done after waking up, before you eat, and with no clothing or minimal clothing on.     Specific Goals:  Calorie goal:  5166-0225 anjelica/day (Provided with meal plan to follow)   Patient lifestyle habits were reviewed.  Nutrition was  discussed at length and patient was provided once again with a meal plan to help with food choices as well as portions.  He will utilize a meal plan to try to structure his eating habits more evenly.  He will focus on the middle of his day as he tends to skip lunch and snacks at this time.  He will try to utilize protein shakes as necessary if he is going to skip these meals to keep his body more evenly fueled.  He was also given a list of high-protein snacks to use instead of skipping meals.  Hydration was discussed and patient will continue to try to increase his water intake to at least 5-6 bottles of water daily.  Exercise was discussed and patient was encouraged to start an exercise program for at least 20 minutes 2 to 3 days a week to start.  Medications were discussed and patient is going to continue with compound semaglutide.  Patient was made aware that this will not be prescribed by this office.  The risks of compound medication were discussed at length including the lack of regulation as well as the unknowns that can be mixed in with the medication.  Patient voiced understanding and would like to continue to follow with medical weight management for nutrition guidance.  He would like to switch to brand-name Wegovy with his next open enrollment period through his insurance company.

## 2024-07-02 NOTE — PROGRESS NOTES
Assessment/Plan:     Class 3 severe obesity due to excess calories with serious comorbidity and body mass index (BMI) of 40.0 to 44.9 in adult (HCC)  - Patient is pursuing Conservative Program and follow up visits with medical weight management provider  - Initial weight loss goal of 5-10% weight loss for improved health. Weight loss can improve patient's co-morbid conditions and/or prevent weight-related complications.  - Explained the importance of continuing lifestyle changes in addition to any anti-obesity medications.   - Labs reviewed from 6/2023    General Recommendations:  Nutrition:  Eat breakfast daily.  Do not skip meals.      Food log (ie.) www.Torbit.com, sparkpeople.com, loseit.com, Watsin.com, etc.     Practice mindful eating.  Be sure to set aside time to eat, eat slowly, and savor your food.     Hydration:    At least 64oz of water daily.  No sugar sweetened beverages.  No juice (eat the fruit instead).     Exercise:  Studies have shown that the ideal exercise goal is somewhere between 150 to 300 minutes of moderate intensity exercise a week.  Start with exercising 10 minutes every other day and gradually increase physical activity with a goal of at least 150 minutes of moderate intensity exercise a week, divided over at least 3 days a week.  An example of this would be exercising 30 minutes a day, 5 days a week.  Resistance training can increase muscle mass and increase our resting metabolic rate.   FULL BODY resistance training is recommended 2-3 times a week.  Do not do this on consecutive days to allow for muscle recovery.     Aim for a bare minimum 5000 steps, even on days you do not exercise.     Monitoring:   Weigh yourself daily.  If this causes undue stress, then just weigh yourself once a week.  Weigh yourself the same time of the day with the same amount of clothing on.  Preferably this should be done after waking up, before you eat, and with no clothing or minimal clothing  on.     Specific Goals:  Calorie goal:  4499-9912 anjelica/day (Provided with meal plan to follow)   Patient lifestyle habits were reviewed.  Nutrition was discussed at length and patient was provided once again with a meal plan to help with food choices as well as portions.  He will utilize a meal plan to try to structure his eating habits more evenly.  He will focus on the middle of his day as he tends to skip lunch and snacks at this time.  He will try to utilize protein shakes as necessary if he is going to skip these meals to keep his body more evenly fueled.  He was also given a list of high-protein snacks to use instead of skipping meals.  Hydration was discussed and patient will continue to try to increase his water intake to at least 5-6 bottles of water daily.  Exercise was discussed and patient was encouraged to start an exercise program for at least 20 minutes 2 to 3 days a week to start.  Medications were discussed and patient is going to continue with compound semaglutide.  Patient was made aware that this will not be prescribed by this office.  The risks of compound medication were discussed at length including the lack of regulation as well as the unknowns that can be mixed in with the medication.  Patient voiced understanding and would like to continue to follow with medical weight management for nutrition guidance.  He would like to switch to brand-name Wegovy with his next open enrollment period through his insurance company.         Benjie was seen today for follow-up.    Diagnoses and all orders for this visit:    Class 3 severe obesity due to excess calories with serious comorbidity and body mass index (BMI) of 40.0 to 44.9 in adult (HCC)        Total time spent reviewing chart, interviewing patient, examining patient, discussing plan, answering all questions, and documentin minutes with >50% face-to-face time with the patient.    Follow up in approximately 3 months with Non-Surgical  "Physician/Advanced Practitioner.    Subjective:   Chief Complaint   Patient presents with    Follow-up     Pt is here for MWM f/u. Waist-52\"       Patient ID: Benjie Ellis  is a 59 y.o. male with excess weight/obesity here to pursue weight management.  Patient is pursuing Conservative Program.   Most recent notes and records were reviewed.    HPI    Wt Readings from Last 20 Encounters:   07/02/24 127 kg (280 lb 12.8 oz)   04/30/24 129 kg (284 lb)   04/10/24 126 kg (278 lb)   03/27/24 126 kg (278 lb)   01/29/24 127 kg (279 lb)   12/13/23 125 kg (275 lb)   09/07/23 123 kg (271 lb 2.7 oz)   08/22/23 123 kg (272 lb)   05/26/23 122 kg (269 lb 6 oz)   05/16/23 123 kg (272 lb)   11/14/22 122 kg (269 lb)   08/12/22 120 kg (265 lb)   08/05/22 119 kg (261 lb 14.5 oz)   07/26/22 119 kg (262 lb)   07/20/22 116 kg (255 lb 15.3 oz)   06/29/22 121 kg (266 lb)   05/03/22 119 kg (263 lb)   04/07/22 119 kg (263 lb)   03/24/22 118 kg (260 lb)   03/01/22 118 kg (260 lb)       Patient presents today to medical weight management office for follow up.  Semaglutide compound - mailorder - starting 0.5 mg    Weight loss medication and dose: semaglutide 0.25 (compound medication)  Started weight and date: 284 lbs   Current weight: 280.8 lbs  Difference: -3.2 lbs  Goal weight: 1st goal 240 lbs, around 200 for ultimate     Starting BMI: 41.94 in 4/2024  Current BMI: 41.47    Waist Measurements:  4/2024: 50.5 in  7/2024: 52 in    Patient was referred by his cardiologist to try to get his weight under control. Patient has tried diets in the past and was most successful with Soraya Calle when he had complete meal replacement. He has tried low carb without any success.  Patient is retired but works part time. He often eats irregularly throughout the day because of his schedule. He often eats in the afternoon and late at night, skipping meals earlier in the day.       Diet recall:  B: coffee with cream  L: sandwich  D: burgers OR meatloaf OR " "chicken with vegetables and rice  S: leftovers OR cheese     Hydration: coffee, water -4-5 bottles, tea occasionally at night  Alcohol: no - on vacations  Smoking: no  Exercise: no  Occupation: retired (computer work)  Sleep: well  STOP ban/8        The following portions of the patient's history were reviewed and updated as appropriate: allergies, current medications, past family history, past medical history, past social history, past surgical history, and problem list.    Family History   Problem Relation Age of Onset    Hypertension Mother     Diabetes Mother     Hypertension Father     Colon polyps Father     Mental illness Neg Hx         Review of Systems   Constitutional:  Negative for fatigue.   HENT:  Negative for sore throat.    Respiratory:  Negative for cough and shortness of breath.    Cardiovascular:  Negative for chest pain, palpitations and leg swelling.   Gastrointestinal:  Negative for abdominal pain, constipation, diarrhea and nausea.   Genitourinary:  Negative for dysuria.   Musculoskeletal:  Negative for arthralgias and back pain.   Skin:  Negative for rash.   Neurological:  Negative for headaches.   Psychiatric/Behavioral:  Negative for dysphoric mood. The patient is not nervous/anxious.        Objective:  /88 (BP Location: Left arm, Patient Position: Sitting, Cuff Size: Large)   Pulse 78   Ht 5' 9\" (1.753 m)   Wt 127 kg (280 lb 12.8 oz)   BMI 41.47 kg/m²     Physical Exam  Vitals and nursing note reviewed.   Constitutional:       Appearance: Normal appearance. He is obese.   HENT:      Head: Normocephalic.   Pulmonary:      Effort: Pulmonary effort is normal.   Neurological:      General: No focal deficit present.      Mental Status: He is alert and oriented to person, place, and time.   Psychiatric:         Mood and Affect: Mood normal.         Behavior: Behavior normal.         Thought Content: Thought content normal.         Judgment: Judgment normal.            Labs   Most " "recent labs reviewed   Lab Results   Component Value Date    SODIUM 139 06/12/2023    K 4.1 06/12/2023     06/12/2023    CO2 23 06/12/2023    AGAP 4 08/05/2022    BUN 11 06/12/2023    CREATININE 1.00 06/12/2023    GLUC 106 (H) 06/12/2023    GLUF 111 (H) 08/04/2022    CALCIUM 8.6 08/05/2022    AST 23 06/12/2023    ALT 33 06/12/2023    TP 7.0 06/12/2023    TBILI 0.8 06/12/2023    EGFR 86 06/12/2023     No results found for: \"HGBA1C\"  Lab Results   Component Value Date    TSH 3.100 06/12/2023     Lab Results   Component Value Date    CHOLESTEROL 154 06/12/2023     Lab Results   Component Value Date    HDL 52 06/12/2023     Lab Results   Component Value Date    TRIG 164 (H) 06/12/2023     Lab Results   Component Value Date    LDLCALC 74 06/12/2023     "

## 2024-08-21 ENCOUNTER — OFFICE VISIT (OUTPATIENT)
Dept: CARDIOLOGY CLINIC | Facility: CLINIC | Age: 59
End: 2024-08-21
Payer: COMMERCIAL

## 2024-08-21 VITALS
HEART RATE: 80 BPM | DIASTOLIC BLOOD PRESSURE: 86 MMHG | OXYGEN SATURATION: 97 % | WEIGHT: 279 LBS | SYSTOLIC BLOOD PRESSURE: 120 MMHG | BODY MASS INDEX: 41.32 KG/M2 | HEIGHT: 69 IN

## 2024-08-21 DIAGNOSIS — I48.0 PAROXYSMAL A-FIB (HCC): ICD-10-CM

## 2024-08-21 DIAGNOSIS — I25.10 CORONARY ARTERY DISEASE INVOLVING NATIVE CORONARY ARTERY OF NATIVE HEART WITHOUT ANGINA PECTORIS: ICD-10-CM

## 2024-08-21 DIAGNOSIS — E66.01 CLASS 3 SEVERE OBESITY WITH BODY MASS INDEX (BMI) OF 40.0 TO 44.9 IN ADULT, UNSPECIFIED OBESITY TYPE, UNSPECIFIED WHETHER SERIOUS COMORBIDITY PRESENT (HCC): ICD-10-CM

## 2024-08-21 DIAGNOSIS — Z95.0 PRESENCE OF PERMANENT CARDIAC PACEMAKER: ICD-10-CM

## 2024-08-21 DIAGNOSIS — Z95.5 S/P CORONARY ARTERY STENT PLACEMENT: ICD-10-CM

## 2024-08-21 DIAGNOSIS — R60.0 BILATERAL LOWER EXTREMITY EDEMA: ICD-10-CM

## 2024-08-21 DIAGNOSIS — R06.02 EXERTIONAL SHORTNESS OF BREATH: ICD-10-CM

## 2024-08-21 DIAGNOSIS — E78.5 DYSLIPIDEMIA: ICD-10-CM

## 2024-08-21 PROCEDURE — 93000 ELECTROCARDIOGRAM COMPLETE: CPT | Performed by: INTERNAL MEDICINE

## 2024-08-21 PROCEDURE — 99214 OFFICE O/P EST MOD 30 MIN: CPT | Performed by: INTERNAL MEDICINE

## 2024-08-21 RX ORDER — METOPROLOL TARTRATE 25 MG/1
12.5 TABLET, FILM COATED ORAL EVERY 12 HOURS
Qty: 90 TABLET | Refills: 1 | Status: SHIPPED | OUTPATIENT
Start: 2024-08-21

## 2024-08-21 NOTE — PROGRESS NOTES
Progress note - Cardiology Office  [facility].    Benjie Ellis 59 y.o. male MRN: [id number]  : 1965  Unit/Bed#:  Encounter: [id number]      Assessment:     1. Exertional shortness of breath    2. Coronary artery disease involving native coronary artery of native heart without angina pectoris    3. S/P coronary artery stent placement    4. Paroxysmal A-fib (HCC)    5. Dyslipidemia    6. Presence of permanent cardiac pacemaker    7. Class 3 severe obesity with body mass index (BMI) of 40.0 to 44.9 in adult, unspecified obesity type, unspecified whether serious comorbidity present (HCC)    8. Bilateral lower extremity edema        Discussion summary and Plan:    1. Exertional shortness of breath.  Patient exertional shortness of breath is no longer present.  He had a successful angioplasty of his codominant RCA and has distal circ 100% occluded which is chronically occluded continue medical Rx.  Nonobstructive disease of LAD noted.  Continue Plavix he is also on Eliquis aspirin has been discontinued    2. Sick sinus syndrome with frequent PACs.  Pacemaker is functioning fine he is around 98% atrial paced.  Has 11 years of battery interrogation reviewed.    3. Dyslipidemia.   Patient has not done any blood test continue Crestor check cholesterol profile and cholesterol levels.    4. Coronary artery disease status post angioplasty of codominant RCA and has 100% occluded distal circumflex which will be managed with medical therapy continue dual platelet therapy.  No more symptoms of angina normal EF.    5. Obesity with BMI around 41.  Encouraged him to lose weight.    6. History of marijuana use .  Patient has quit smoking marijuana.      7. Possible sleep apnea.  Possible sleep apnea he is elected to sleep study is that he may not use CPAP machine.  Encouraged him to get sleep study done.    Continue same Rx stable cardiovascular status follow-up 6 to 9 months.  So            Patient was  advised and educated to call our office  immediately if  patient has any new symptoms of chest pain/shortness of breath, near-syncope, syncope, light headedness sustained palpitations  or any other cardiovascular symptoms before their scheduled follow-up appointment.  Office #[phone].    Thank you for your consultation.  If you have any question please call me at [phone]    Counseling :  A description of the counseling.  Goals and Barriers.  Patient's ability to self care: Yes  Medication side effect reviewed with patient in detail and all their questions answered to their satisfaction.      Primary Care Physician : Frank Lombardi, DO      HPI :     Benjie Ellis is a 59 y.o. year old male who was referred by primary care doctor for exertional shortness of breath and palpitations.  Patient who has medical history significant for bradycardia, obesity with BMI around 39 has noted lately he is having exertional shortness of breath and then racing of his heart.  He had gained about 10-15 lb in the last year and since then he has noted when he climbs stairs or he walks he gets short of breath and occasionally get palpitations.  He was noted to have first-degree AV block as well as episodes of bradycardia today also his heart rate is 56 beats per minute he has sinus arrhythmia.  And first-degree AV block EKG also shows T-wave inversions and ST flattening in inferior leads along with Q-waves in inferior leads.  He denies any personal history of MI in the past.  There is no family history of premature coronary artery disease but his mother did have pacemaker.     Other medical history significant for history of kidney stones.    He never smoked but lately he started using marijuana mostly in the evening for some time to sleep.  No nausea no vomiting no fever no chills no leg edema.    Past surgical history.  Multiple orthopedic surgery including shoulder surgery and biceps surgery.    06/29/2022.    Above  reviewed.  Patient was initially seen by us for exertion shortness of breath and palpitations.  He also had history of bradycardia.  Holter monitor shows his average heart rate was 49 beats per minute and he has frequent PACs defer 7.6% of his total beats.  He underwent nuclear stress test.  He tried walking on the treadmill he did walk for 8 minutes and he could only achieve 73% of his maximum predicted heart rate and then he became bradycardic.  He did with symptoms of dizziness and lightheadedness and he was in junctional bradycardia and test was changed to pharmacological test.  Which shows he had a EKG changes as well as ischemia.  Holter monitor shows patient has bradycardia for 35 hours per  He admits that he snores at night.  He feels he has more easy fatigue and tired but denies any symptoms of dizziness or lightheadedness.  He has not passed out.  There is a family history of pacemaker his mother also had a pacemaker and his father also had a pacemaker at some point of his life.      11/14/2022     above reviewed.  Patient came for follow-up denies any new complaints.  He is atrial paced 86% of the time.  He feels no exertional shortness of breath he is doing all his activities.  He has angioplasty of RCA done with distal circ which is chronically occluded.  He is on high intensity statin.  No nausea no vomiting no fever no PND no orthopnea no other cardiovascular issues.  He has medical history significant for bradycardia sick sinus syndrome status post pacemaker, frequent PACs, CAD status post angioplasty, obesity and sleep apnea he is trying to lose weight he has been on high intensity statin.  Device interrogation reviewed with him.  No other cardiovascular issues today.    5/16/2023.    Reviewed.  Patient came for follow-up he is doing great.  No cardiovascular issues.  He is using pacemaker around 85% of the time.  He is able to do all his activities.  He has history of angioplasty of RCA with distal  circumflex chronically occluded.  He is on high intensity statin.  Occasionally feels palpitations.  Since he has pacemaker his functional capacity and exercise capacity is improved.    12/13/2023.    Above reviewed.  Patient came for follow-up.  He has medical history significant for sick sinus syndrome s/p pacemaker, CAD with a history of angioplasty of RCA with distal circumflex chronically occluded, hypertensive heart disease, possible obstructive sleep apnea, obesity who came for follow-up.  His device was interrogated in November 2023.  He is using pacemaker about 85% of the time.  He still occasionally uses marijuana.  His blood test from 6/12/2023 reviewed.  His electrolyte profile acceptable vitals are stable.  He feels great denies any new symptoms no chest pain no shortness of breath no other cardiovascular issues at this time.    8/21/2024    Above reviewed.  Patient came for follow-up. He has medical history significant for sick sinus syndrome s/p pacemaker, CAD with a history of angioplasty of RCA with distal circumflex chronically occluded, hypertensive heart disease, possible obstructive sleep apnea, obesity who came for follow-up.  His last blood test was June 2023.  His device was interrogated in April 2024 and he is about 98% atrial paced.  He has echo Doppler done in April 2024 EF 65%, mild TR trace to mild MR and mild aortic valve sclerosis.  He is motivated to lose weight he has seen bariatric surgery.  He does not smoke marijuana now.  He has been on semaglutide at a low dose not able to lose much weight yet.  He is working with bariatric surgery.  He is compliant with his Crestor medication Plavix and Eliquis and metoprolol heart rate today is 80 bpm he is atrial paced.      Review of Systems   Constitutional:  Negative for activity change, chills, diaphoresis, fever and unexpected weight change.   HENT:  Negative for congestion.    Eyes:  Negative for discharge and redness.   Respiratory:   Negative for cough, chest tightness, shortness of breath and wheezing.    Cardiovascular: Negative.  Negative for chest pain, palpitations and leg swelling.   Gastrointestinal:  Negative for abdominal pain, diarrhea and nausea.   Endocrine: Negative.    Genitourinary:  Negative for decreased urine volume and urgency.   Musculoskeletal: Negative.  Negative for arthralgias, back pain and gait problem.   Skin:  Negative for rash and wound.   Allergic/Immunologic: Negative.    Neurological:  Negative for dizziness, seizures, syncope, weakness, light-headedness and headaches.   Hematological: Negative.    Psychiatric/Behavioral:  Negative for agitation and confusion. The patient is not nervous/anxious.        Historical Information   Past Medical History:   Diagnosis Date   • Hyperlipidemia    • Irregular heart beat     bradycardia post stress test, holter monitor on until 05/05/22 Dr Espinoza   • Kidney stone      Past Surgical History:   Procedure Laterality Date   • BICEPS TENDON REPAIR Bilateral    • CARDIAC CATHETERIZATION N/A 07/20/2022    Procedure: Cardiac Coronary Angiogram;  Surgeon: Mercedez Espinoza MD;  Location: [facility];  Service: Cardiology   • CARDIAC CATHETERIZATION Left 07/20/2022    Procedure: Cardiac Left Heart Cath;  Surgeon: Mercedez Espinoza MD;  Location: [facility];  Service: Cardiology   • CARDIAC CATHETERIZATION N/A 07/20/2022    Procedure: Cardiac pci;  Surgeon: Mercedez Espinoza MD;  [facility]: [facility];  Service: Cardiology   • CARDIAC ELECTROPHYSIOLOGY PROCEDURE N/A 08/04/2022    Procedure: Cardiac pacer implant;  Surgeon: Mercedez Espinoza MD;  Location: [facility];  Service: Cardiology   • CARDIAC PACEMAKER PLACEMENT  08/04/2022   • INGUINAL HERNIA REPAIR     • LASIK Bilateral    • ROTATOR CUFF REPAIR Right    • VASECTOMY       Social History     Substance and Sexual Activity   Alcohol Use Not Currently    Comment: rare     Social History     Substance and  "Sexual Activity   Drug Use Not Currently   • Types: Marijuana     Social History     Tobacco Use   Smoking Status Never   Smokeless Tobacco Never     Family History:   Family History   Problem Relation Age of Onset   • Hypertension Mother    • Diabetes Mother    • Hypertension Father    • Colon polyps Father    • Mental illness Neg Hx        Meds/Allergies     No Known Allergies    Current Outpatient Medications:   •  apixaban (Eliquis) 5 mg, Take 1 tablet by mouth twice daily, Disp: 180 tablet, Rfl: 1  •  clopidogrel (PLAVIX) 75 mg tablet, Take 1 tablet by mouth once daily, Disp: 90 tablet, Rfl: 3  •  metoprolol tartrate (LOPRESSOR) 25 mg tablet, Take 0.5 tablets (12.5 mg total) by mouth every 12 (twelve) hours, Disp: 90 tablet, Rfl: 1  •  rosuvastatin (CRESTOR) 20 MG tablet, Take 1 tablet by mouth once daily, Disp: 90 tablet, Rfl: 1  •  SEMAGLUTIDE,0.25 OR 0.5MG/DOS, SC, Inject 0.25 mg under the skin once a week Compound medication, Disp: , Rfl:     Vitals: Blood pressure 120/86, pulse 80, height 5' 9\" (1.753 m), weight 127 kg (279 lb), SpO2 97%.    Body mass index is 41.2 kg/m².  Wt Readings from Last 3 Encounters:   08/21/24 127 kg (279 lb)   07/02/24 127 kg (280 lb 12.8 oz)   04/30/24 129 kg (284 lb)     Vitals:    08/21/24 1054   Weight: 127 kg (279 lb)         BP Readings from Last 3 Encounters:   08/21/24 120/86   07/02/24 118/88   04/30/24 136/88         Physical Exam  Constitutional:       General: He is not in acute distress.     Appearance: He is well-developed. He is not diaphoretic.   Neck:      Thyroid: No thyromegaly.      Vascular: No JVD.      Trachea: No tracheal deviation.   Cardiovascular:      Rate and Rhythm: Normal rate and regular rhythm.      Heart sounds: S1 normal and S2 normal. Heart sounds not distant. Murmur heard.      Systolic (ejection) murmur is present with a grade of 2/6.      No friction rub. No gallop. No S3 or S4 sounds.   Pulmonary:      Effort: Pulmonary effort is normal. No " respiratory distress.      Breath sounds: Normal breath sounds. No wheezing or rales.   Chest:      Chest wall: No tenderness.   Abdominal:      General: Bowel sounds are normal. There is no distension.      Palpations: Abdomen is soft.      Tenderness: There is no abdominal tenderness.   Musculoskeletal:         General: No deformity.      Cervical back: Neck supple.   Skin:     General: Skin is warm and dry.      Coloration: Skin is not pale.      Findings: No rash.   Neurological:      Mental Status: He is alert and oriented to person, place, and time.   Psychiatric:         Behavior: Behavior normal.         Judgment: Judgment normal.            Diagnostic Studies Review Cardio:    Echo Doppler 05/03/2022 shows normal LV systolic function EF 60-65%, borderline LV thickness.  No significant valvular disease.    Nuclear stress test.  Nuclear stress test shows small reversible ischemia in the inferior wall with sum score of 5. Patient did have some junctional rhythm during recovery along with bradycardia.  EKG shows 1 mm ST depression which was upsloping and horizontal.  EF was 64%.      Holter monitor shows patient has average heart rate is 49 beats per minute.  Maximum heart rate was 114 beats per minute.  Patient has frequent PACs there were 7.6% of the total beats.  Patient was in bradycardia for 35 hours.  He has a 2.9 seconds pause during sleep hours.      EKG:    Twelve lead EKG done on 04/07/2022 shows sinus bradycardia heart rate 56 beats per minute first-degree AV block.  Q-wave noted in inferior leads with ST flattening cannot rule out inferior wall infarction    Lead EKG 08/12/2022 shows atrial paced rhythm ventricular sensed.    Twelve-lead EKG 5/16/2022 shows atrial paced ventricular sensed heart rate 81 bpm.    Twelve-lead EKG done on 12/13/2023 shows atrial paced ventricular sensed heart rate 85 bpm.    Twelve-lead EKG done on 8/21/2024 atrial paced heart rate 80 bpm.  Not much change from previous  "EKG.      Pacemaker interrogation.  Device interrogation done in September 2022 patient is using pacemaker 86% of time in the atrium and 2% ventricular.  One episode of SVT noted.    Pacemaker interrogation in February 2023 pacemaker is functioning adequately.  Around 85% atrial paced episode of 1 beats noted.    Imaging:  Chest X-Ray:   No Chest XR results available for this patient.    CT-scan of the chest:     No CTA results available for this patient.  Lab Review     BMP:  Lab Results   Component Value Date    SODIUM 139 06/12/2023    K 4.1 06/12/2023     06/12/2023    CO2 23 06/12/2023    BUN 11 06/12/2023    CREATININE 1.00 06/12/2023    GLUC 106 (H) 06/12/2023    GLUF 111 (H) 08/04/2022    CALCIUM 8.6 08/05/2022    EGFR 86 06/12/2023     Troponins:    LFT:  Lab Results   Component Value Date    AST 23 06/12/2023    ALT 33 06/12/2023    TP 7.0 06/12/2023    ALB 4.6 06/12/2023      Lipid Profile:   Lab Results   Component Value Date    CHOLESTEROL 154 06/12/2023    HDL 52 06/12/2023    LDLCALC 74 06/12/2023    TRIG 164 (H) 06/12/2023     Lab Results   Component Value Date    CHOLESTEROL 154 06/12/2023    CHOLESTEROL 227 (H) 03/10/2022     The 10-year ASCVD risk score (Shantanu CONNOR, et al., 2019) is: 7.6%    Values used to calculate the score:      Age: 59 years      Sex: Male      Is Non- : No      Diabetic: No      Tobacco smoker: No      Systolic Blood Pressure: 120 mmHg      Is BP treated: Yes      HDL Cholesterol: 52 mg/dL      Total Cholesterol: 154 mg/dL      Dr. Mercedez Espinoza MD [facility]      \"This note has been constructed using a voice recognition system.Therefore there may be syntax, spelling, and/or grammatical errors. Please call if you have any questions. \"  "

## 2024-08-26 NOTE — PROGRESS NOTES
Sleep Medicine Outpatient Note   Benjie Ellis 59 y.o. male MRN: [id number]  8/27/2024      Referring Physician: Kell Newberry PA-C    Reason for Consultation:    Chief Complaint   Patient presents with    Snoring     Assessment/Plan:    1. Suspected sleep apnea  Assessment & Plan:  Referred by cardiology for evaluation of suspected obstructive sleep apnea.  He has snoring but no choking/gasping/witnessed apneas during sleep.  He also does not have excessive daytime sleepiness.  He does have some occasional dozing off during sedentary activities.    Since he does not have significant daytime impairment, testing and potential treatment could wait.  He is also apprehensive about his tolerance of CPAP.  Currently is on semaglutide and is in the process of losing weight with this medication as is being uptitrated.    I recommend that he undergo a home sleep study when he is at his target weight which could take 3 to 6 months to occur.  He agrees with this plan and will call to schedule a home sleep study when he feels like he is at his target weight    Will schedule a follow-up in 1 year just in case  Orders:  -     Home Study; Future  2. Paroxysmal A-fib (HCC)  -     Ambulatory Referral to Sleep Medicine      Health Maintenance  Immunization History   Administered Date(s) Administered    Tdap 03/01/2022        Return in about 1 year (around 8/27/2025).    History of Present Illness   HPI:  Benjie Ellis is a 59 y.o. male who has a past medical history of sick sinus syndrome/AV block s/p PPM, CAD (PCI), HLD, obesity who is presenting for evaluation of suspected sleep apnea    He was referred by cardiology mainly due to snoring.  He denies any choking/gasping/witnessed apneas during sleep.  He generally rates his sleep quality is pretty good.  He goes to sleep at 1 to 2 AM and wakes up on his own at 9:30 AM without an alarm.  He does not have issues with sleep initiation or sleep maintenance.  He gets on average 7  hours of sleep.  He does not have to take naps during the day.    His [place] is 5.  He does not feel excessively sleepy during the day but does have occasional occurrences where he dozed off in front of the computer.  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      In the past he used marijuana to help with sleep initiation but stopped it after consulting with his physician.  He no longer has issues with sleep initiation.    No restless legs, parasomnias, cataplexy.  He drinks coffee during the day and a cup of tea at night.  No tobacco/alcohol/drugs.    Recently saw bariatrics and started on semaglutide for a few months and now at 1mg.  He is feeling decreased appetite without nausea/vomiting.      Historical Information   Past Medical History:   Diagnosis Date    Hyperlipidemia     Irregular heart beat     bradycardia post stress test, holter monitor on until 05/05/22 Dr Espinoza    Kidney stone      Past Surgical History:   Procedure Laterality Date    BICEPS TENDON REPAIR Bilateral     CARDIAC CATHETERIZATION N/A 07/20/2022    Procedure: Cardiac Coronary Angiogram;  Surgeon: Mercedez Espinoza MD;  [facility];  Service: Cardiology    CARDIAC CATHETERIZATION Left 07/20/2022    Procedure: Cardiac Left Heart Cath;  Surgeon: Mercedez Espinoza MD;  [facility];  Service: Cardiology    CARDIAC CATHETERIZATION N/A 07/20/2022    Procedure: Cardiac pci;  Surgeon: Mercedez Espinoza MD;  [facility];  Service: Cardiology    CARDIAC ELECTROPHYSIOLOGY PROCEDURE N/A 08/04/2022    Procedure: Cardiac pacer implant;  Surgeon: Mercedez Espinoza MD;  Location: [facility];  Service: Cardiology    CARDIAC PACEMAKER PLACEMENT  08/04/2022    INGUINAL HERNIA REPAIR      LASIK Bilateral     ROTATOR CUFF REPAIR Right     VASECTOMY       Family History   Problem Relation Age of Onset    Hypertension Mother     Diabetes Mother     Hypertension Father     Colon  "polyps Father     Mental illness Neg Hx          Meds/Allergies     Current Outpatient Medications:     apixaban (Eliquis) 5 mg, Take 1 tablet by mouth twice daily, Disp: 180 tablet, Rfl: 1    clopidogrel (PLAVIX) 75 mg tablet, Take 1 tablet by mouth once daily, Disp: 90 tablet, Rfl: 3    metoprolol tartrate (LOPRESSOR) 25 mg tablet, Take 0.5 tablets (12.5 mg total) by mouth every 12 (twelve) hours, Disp: 90 tablet, Rfl: 1    rosuvastatin (CRESTOR) 20 MG tablet, Take 1 tablet by mouth once daily, Disp: 90 tablet, Rfl: 1    SEMAGLUTIDE,0.25 OR 0.5MG/DOS, SC, Inject 1 mg under the skin once a week Compound medication, Disp: , Rfl:   No Known Allergies    Vitals: Blood pressure 118/60, pulse 75, height 5' 9\" (1.753 m), weight 128 kg (281 lb 6.4 oz), SpO2 98%. Body mass index is 41.56 kg/m². Oxygen Therapy  SpO2: 98 %      Physical Exam  Vitals and nursing note reviewed.   Constitutional:       General: He is not in acute distress.     Appearance: Normal appearance. He is well-developed. He is not ill-appearing, toxic-appearing or diaphoretic.   HENT:      Head: Normocephalic and atraumatic.      Mouth/Throat:      Mouth: Mucous membranes are moist.      Pharynx: Oropharynx is clear. No oropharyngeal exudate.   Eyes:      Conjunctiva/sclera: Conjunctivae normal.   Cardiovascular:      Rate and Rhythm: Normal rate and regular rhythm.   Pulmonary:      Effort: Pulmonary effort is normal. No respiratory distress.      Breath sounds: Normal breath sounds. No stridor.   Abdominal:      Tenderness: There is no guarding.   Musculoskeletal:         General: No swelling.      Cervical back: Normal range of motion and neck supple. No rigidity.      Right lower leg: No edema.      Left lower leg: No edema.   Skin:     General: Skin is warm and dry.   Neurological:      General: No focal deficit present.      Mental Status: He is alert and oriented to person, place, and time. Mental status is at baseline.   Psychiatric:         " "Mood and Affect: Mood normal.       Neck Circumference: 18     Labs:   I have personally reviewed pertinent lab results.    ABG: No results found for: \"PHART\", \"CMD4JBU\", \"PO2ART\", \"DEJ7QJP\", \"D6OFAPFL\", \"BEART\", \"SOURCE\",   BNP: No results found for: \"BNP\",   CBC:  Lab Results   Component Value Date    WBC 6.7 06/12/2023    HGB 15.6 06/12/2023    HCT 46.1 06/12/2023    MCV 88 06/12/2023     06/12/2023    EOSPCT 7 06/12/2023    EOSABS 0.5 (H) 06/12/2023    NEUTOPHILPCT 51 06/12/2023    LYMPHOPCT 31 06/12/2023   ,   CMP:   Lab Results   Component Value Date    SODIUM 139 06/12/2023    K 4.1 06/12/2023     06/12/2023    CO2 23 06/12/2023    BUN 11 06/12/2023    CREATININE 1.00 06/12/2023    CALCIUM 8.6 08/05/2022    AST 23 06/12/2023    ALT 33 06/12/2023    EGFR 86 06/12/2023   ,   PT/INR:   Lab Results   Component Value Date    INR 1.0 07/18/2022   ,   Ferrtin: No components found for: \"FERRTIN\",  Magensium: No results found for: \"MAGNESIUM\",    Imaging and other studies: I have personally reviewed pertinent reports.   and I have personally reviewed pertinent films in PACS    Sleep Study:    Transthoracic Echo:  4/10/24    Left Ventricle: Left ventricular cavity size is normal. Wall thickness is mildly increased. There is mild concentric hypertrophy. The left ventricular ejection fraction is 65% by visual estimation. Systolic function is normal. Wall motion is normal. Diastolic function is normal for age.    Right Ventricle: A pacer wire is present.    Aortic Valve: There is aortic valve mild sclerosis.    Mitral Valve: There is trace to mild regurgitation.    Tricuspid Valve: There is mild regurgitation.  Pulmonary artery pressure around 28 mmHg.    Prior TTE study available for comparison. Prior study date: 5/3/2022.  No significant changes noted.      Yayo Johnson MD  Pulmonary, Critical Care and Sleep Medicine  [facility]     Portions of the record may have " "been created with voice recognition software. Occasional wrong word or \"sound a like\" substitutions may have occurred due to the inherent limitations of voice recognition software. Please read the chart carefully and recognize, using context, where substitutions have occurred.     "

## 2024-08-27 ENCOUNTER — OFFICE VISIT (OUTPATIENT)
Dept: SLEEP CENTER | Facility: CLINIC | Age: 59
End: 2024-08-27
Payer: COMMERCIAL

## 2024-08-27 VITALS
DIASTOLIC BLOOD PRESSURE: 60 MMHG | SYSTOLIC BLOOD PRESSURE: 118 MMHG | HEIGHT: 69 IN | HEART RATE: 75 BPM | WEIGHT: 281.4 LBS | BODY MASS INDEX: 41.68 KG/M2 | OXYGEN SATURATION: 98 %

## 2024-08-27 DIAGNOSIS — I48.0 PAROXYSMAL A-FIB (HCC): ICD-10-CM

## 2024-08-27 DIAGNOSIS — R29.818 SUSPECTED SLEEP APNEA: Primary | ICD-10-CM

## 2024-08-27 PROCEDURE — 99203 OFFICE O/P NEW LOW 30 MIN: CPT | Performed by: INTERNAL MEDICINE

## 2024-08-27 NOTE — ASSESSMENT & PLAN NOTE
Referred by cardiology for evaluation of suspected obstructive sleep apnea.  He has snoring but no choking/gasping/witnessed apneas during sleep.  He also does not have excessive daytime sleepiness.  He does have some occasional dozing off during sedentary activities.    Since he does not have significant daytime impairment, testing and potential treatment could wait.  He is also apprehensive about his tolerance of CPAP.  Currently is on semaglutide and is in the process of losing weight with this medication as is being uptitrated.    I recommend that he undergo a home sleep study when he is at his target weight which could take 3 to 6 months to occur.  He agrees with this plan and will call to schedule a home sleep study when he feels like he is at his target weight    Will schedule a follow-up in 1 year just in case

## 2024-09-17 ENCOUNTER — OFFICE VISIT (OUTPATIENT)
Dept: FAMILY MEDICINE CLINIC | Facility: CLINIC | Age: 59
End: 2024-09-17
Payer: COMMERCIAL

## 2024-09-17 VITALS
RESPIRATION RATE: 18 BRPM | WEIGHT: 278.4 LBS | SYSTOLIC BLOOD PRESSURE: 118 MMHG | HEART RATE: 80 BPM | HEIGHT: 69 IN | TEMPERATURE: 97.6 F | BODY MASS INDEX: 41.23 KG/M2 | DIASTOLIC BLOOD PRESSURE: 70 MMHG

## 2024-09-17 DIAGNOSIS — I25.10 CORONARY ARTERY DISEASE INVOLVING NATIVE CORONARY ARTERY OF NATIVE HEART WITHOUT ANGINA PECTORIS: ICD-10-CM

## 2024-09-17 DIAGNOSIS — Z00.00 WELL ADULT EXAM: Primary | ICD-10-CM

## 2024-09-17 DIAGNOSIS — Z12.5 SCREENING FOR PROSTATE CANCER: ICD-10-CM

## 2024-09-17 DIAGNOSIS — E78.2 MIXED HYPERLIPIDEMIA: ICD-10-CM

## 2024-09-17 DIAGNOSIS — M10.371 ACUTE GOUT DUE TO RENAL IMPAIRMENT INVOLVING TOE OF RIGHT FOOT: ICD-10-CM

## 2024-09-17 DIAGNOSIS — B35.6 JOCK ITCH: ICD-10-CM

## 2024-09-17 DIAGNOSIS — N40.2 PROSTATE NODULE: ICD-10-CM

## 2024-09-17 PROCEDURE — 99396 PREV VISIT EST AGE 40-64: CPT | Performed by: FAMILY MEDICINE

## 2024-09-17 RX ORDER — CLOTRIMAZOLE AND BETAMETHASONE DIPROPIONATE 10; .64 MG/G; MG/G
CREAM TOPICAL 2 TIMES DAILY
Qty: 45 G | Refills: 0 | Status: SHIPPED | OUTPATIENT
Start: 2024-09-17

## 2024-09-17 NOTE — PROGRESS NOTES
FAMILY PRACTICE HEALTH MAINTENANCE OFFICE VISIT  [facility]    NAME: Benjie Ellis  AGE: 59 y.o. SEX: male  : 1965     DATE: 2024    Assessment and Plan     1. Well adult exam  2. Coronary artery disease involving native coronary artery of native heart without angina pectoris  3. Acute gout due to renal impairment involving toe of right foot  -     Uric acid; Future; Expected date: 2025  4. Mixed hyperlipidemia  5. Screening for prostate cancer  -     PSA, Total Screen; Future  6. Jock itch  -     clotrimazole-betamethasone (LOTRISONE) 1-0.05 % cream; Apply topically 2 (two) times a day  7. Prostate nodule  -     Ambulatory referral to Urology; Future      Patient Counseling:   Nutrition: Stressed importance of a well balanced diet, moderation of sodium/saturated fat, caloric balance and sufficient intake of fiber  Exercise: Stressed the importance of regular exercise with a goal of 150 minutes per week  Dental Health: Discussed daily flossing and brushing and regular dental visits     Immunizations reviewed: Declined recommended vaccinations  Discussed benefits of:  Colon Cancer Screening, Prostate Cancer Screening , and Screening labs.  BMI Counseling: Body mass index is 41.11 kg/m². Discussed with patient's BMI with him. The BMI is above normal. Nutrition recommendations include reducing portion sizes.    No follow-ups on file.        Chief Complaint     Chief Complaint   Patient presents with    Physical Exam     Frances Lay CMA        History of Present Illness     Pt is here for a full physical  Pt states he just saw his cardio and he ordered labs    Pt has a tube of lotrisone he would like refilled for luis ramirez    Pt is taking wegovy - he coordinates through weight center but gets from someplace else        Well Adult Physical   Patient here for a comprehensive physical exam.      Diet and Physical Activity  Diet: well balanced diet  Exercise:  intermittently      Depression Screen  PHQ-2/9 Depression Screening    Little interest or pleasure in doing things: 0 - not at all  Feeling down, depressed, or hopeless: 0 - not at all  PHQ-2 Score: 0  PHQ-2 Interpretation: Negative depression screen          General Health  Hearing: Normal:  bilateral  Vision: no vision problems  Dental: regular dental visits    Reproductive Health  No issues       The following portions of the patient's history were reviewed and updated as appropriate: allergies, current medications, past family history, past medical history, past social history, past surgical history and problem list.    Review of Systems     Review of Systems    Past Medical History     Past Medical History:   Diagnosis Date    Hyperlipidemia     Irregular heart beat     bradycardia post stress test, holter monitor on until 05/05/22 Dr Espinoza    Kidney stone        Past Surgical History     Past Surgical History:   Procedure Laterality Date    BICEPS TENDON REPAIR Bilateral     CARDIAC CATHETERIZATION N/A 07/20/2022    Procedure: Cardiac Coronary Angiogram;  Surgeon: Mercedez Espinoza MD;  [facility];  Service: Cardiology    CARDIAC CATHETERIZATION Left 07/20/2022    Procedure: Cardiac Left Heart Cath;  Surgeon: Mercedez Espinoza MD;  [facility];  Service: Cardiology    CARDIAC CATHETERIZATION N/A 07/20/2022    Procedure: Cardiac pci;  Surgeon: Mercedez Espinoza MD;  [facility];  Service: Cardiology    CARDIAC ELECTROPHYSIOLOGY PROCEDURE N/A 08/04/2022    Procedure: Cardiac pacer implant;  Surgeon: Mercedez Espinoza MD;  [facility]: [facility];  Service: Cardiology    CARDIAC PACEMAKER PLACEMENT  08/04/2022    INGUINAL HERNIA REPAIR      LASIK Bilateral     ROTATOR CUFF REPAIR Right     VASECTOMY         Social History     Social History     Socioeconomic History    Marital status: /Civil Union     Spouse name: None    Number of children: None     Years of education: None    Highest education level: None   Occupational History    None   Tobacco Use    Smoking status: Never    Smokeless tobacco: Never   Vaping Use    Vaping status: Former    Substances: THC   Substance and Sexual Activity    Alcohol use: Not Currently     Comment: rare    Drug use: Not Currently     Types: Marijuana    Sexual activity: Yes     Partners: Female     Birth control/protection: Male Sterilization     Comment: Vasectomy   Other Topics Concern    None   Social History Narrative    None     Social Determinants of Health     Financial Resource Strain: Not on file   Food Insecurity: No Food Insecurity (8/4/2022)    Hunger Vital Sign     Worried About Running Out of Food in the Last Year: Never true     Ran Out of Food in the Last Year: Never true   Transportation Needs: No Transportation Needs (8/4/2022)    PRAPARE - Transportation     Lack of Transportation (Medical): No     Lack of Transportation (Non-Medical): No   Physical Activity: Not on file   Stress: Not on file   Social Connections: Not on file   Intimate Partner Violence: Not on file   Housing Stability: Low Risk  (8/4/2022)    Housing Stability Vital Sign     Unable to Pay for Housing in the Last Year: No     Number of Places Lived in the Last Year: 1     Unstable Housing in the Last Year: No       Family History     Family History   Problem Relation Age of Onset    Hypertension Mother     Diabetes Mother     Hypertension Father     Colon polyps Father     Mental illness Neg Hx        Current Medications       Current Outpatient Medications:     apixaban (Eliquis) 5 mg, Take 1 tablet by mouth twice daily, Disp: 180 tablet, Rfl: 1    clopidogrel (PLAVIX) 75 mg tablet, Take 1 tablet by mouth once daily, Disp: 90 tablet, Rfl: 3    clotrimazole-betamethasone (LOTRISONE) 1-0.05 % cream, Apply topically 2 (two) times a day, Disp: 45 g, Rfl: 0    metoprolol tartrate (LOPRESSOR) 25 mg tablet, Take 0.5 tablets (12.5 mg total) by mouth  "every 12 (twelve) hours, Disp: 90 tablet, Rfl: 1    rosuvastatin (CRESTOR) 20 MG tablet, Take 1 tablet by mouth once daily, Disp: 90 tablet, Rfl: 1    SEMAGLUTIDE,0.25 OR 0.5MG/DOS, SC, Inject 1 mg under the skin once a week Compound medication 1mg injection weekly., Disp: , Rfl:      Allergies     No Known Allergies    Objective     /70   Pulse 80   Temp 97.6 °F (36.4 °C)   Resp 18   Ht 5' 9\" (1.753 m)   Wt 126 kg (278 lb 6.4 oz)   BMI 41.11 kg/m²      Physical Exam  Vitals and nursing note reviewed.   Constitutional:       General: He is not in acute distress.     Appearance: He is well-developed. He is not diaphoretic.   HENT:      Head: Normocephalic and atraumatic.      Right Ear: External ear normal.      Left Ear: External ear normal.      Nose: Nose normal.      Mouth/Throat:      Pharynx: No oropharyngeal exudate.   Eyes:      General: No scleral icterus.        Right eye: No discharge.         Left eye: No discharge.      Pupils: Pupils are equal, round, and reactive to light.   Neck:      Thyroid: No thyromegaly.   Cardiovascular:      Rate and Rhythm: Normal rate.      Heart sounds: Normal heart sounds. No murmur heard.  Pulmonary:      Effort: Pulmonary effort is normal. No respiratory distress.      Breath sounds: Normal breath sounds. No wheezing.   Abdominal:      General: Bowel sounds are normal. There is no distension.      Palpations: Abdomen is soft. There is no mass.      Tenderness: There is no abdominal tenderness. There is no guarding or rebound.   Genitourinary:     Comments: Small induration lover middle of prostate  Musculoskeletal:         General: Normal range of motion.   Skin:     General: Skin is warm and dry.      Findings: No erythema or rash.   Neurological:      Mental Status: He is alert.      Coordination: Coordination normal.      Deep Tendon Reflexes: Reflexes normal.   Psychiatric:         Behavior: Behavior normal.           Vision Screening    Right eye Left eye " Both eyes   Without correction 20/20 20/15 20/15   With correction              [facility]

## 2024-09-18 ENCOUNTER — TELEPHONE (OUTPATIENT)
Dept: CARDIOLOGY CLINIC | Facility: CLINIC | Age: 59
End: 2024-09-18

## 2024-09-18 ENCOUNTER — TELEPHONE (OUTPATIENT)
Age: 59
End: 2024-09-18

## 2024-09-18 ENCOUNTER — LAB (OUTPATIENT)
Dept: LAB | Facility: HOSPITAL | Age: 59
End: 2024-09-18
Attending: INTERNAL MEDICINE
Payer: COMMERCIAL

## 2024-09-18 DIAGNOSIS — Z12.5 SCREENING FOR PROSTATE CANCER: ICD-10-CM

## 2024-09-18 DIAGNOSIS — M10.371 ACUTE GOUT DUE TO RENAL IMPAIRMENT INVOLVING TOE OF RIGHT FOOT: Primary | ICD-10-CM

## 2024-09-18 DIAGNOSIS — M10.371 ACUTE GOUT DUE TO RENAL IMPAIRMENT INVOLVING TOE OF RIGHT FOOT: ICD-10-CM

## 2024-09-18 DIAGNOSIS — Z00.6 ENCOUNTER FOR EXAMINATION FOR NORMAL COMPARISON OR CONTROL IN CLINICAL RESEARCH PROGRAM: ICD-10-CM

## 2024-09-18 LAB
PSA SERPL-MCNC: 0.52 NG/ML (ref 0–4)
URATE SERPL-MCNC: 7.3 MG/DL (ref 3.5–8.5)

## 2024-09-18 PROCEDURE — 84550 ASSAY OF BLOOD/URIC ACID: CPT

## 2024-09-18 PROCEDURE — 36415 COLL VENOUS BLD VENIPUNCTURE: CPT

## 2024-09-18 PROCEDURE — G0103 PSA SCREENING: HCPCS

## 2024-09-18 NOTE — TELEPHONE ENCOUNTER
Lab called stating patient is having labs done.  Pt was under the impression that he was supposed to have his Uric acid lab done , but the date is not until 3/17/2025? Pt thought he was supposed to have that done?  If he is, the lab needs a new order put in the system.

## 2024-09-18 NOTE — TELEPHONE ENCOUNTER
Ciara from [facility] called re: pt's newer uric acid order.  It still has a future date on it.  Please re-enter without a future date.  She will watch pt's chart for the order.

## 2024-09-18 NOTE — TELEPHONE ENCOUNTER
Patient's spouse Ramona called today to establish the patient for Prostate nodule. Ramona stated that it was discovered during recent PCP office visit.    Pt is scheduled on 9/24 9:15 AM with Dr Viveros in [place].     Ramona will call insurance to verify that they are within network.    Call back if needed [phone]

## 2024-09-18 NOTE — TELEPHONE ENCOUNTER
----- Message from Mercedez Espinoza MD sent at 9/18/2024  3:18 PM EDT -----  Patient blood test reviewed.  They are acceptable.  Will continue same medication.  Patient should keep his appointment for follow-up.

## 2024-09-19 ENCOUNTER — REMOTE DEVICE CLINIC VISIT (OUTPATIENT)
Dept: CARDIOLOGY CLINIC | Facility: CLINIC | Age: 59
End: 2024-09-19
Payer: COMMERCIAL

## 2024-09-19 DIAGNOSIS — Z95.0 PRESENCE OF PERMANENT CARDIAC PACEMAKER: Primary | ICD-10-CM

## 2024-09-19 PROCEDURE — 93296 REM INTERROG EVL PM/IDS: CPT | Performed by: INTERNAL MEDICINE

## 2024-09-19 PROCEDURE — 93294 REM INTERROG EVL PM/LDLS PM: CPT | Performed by: INTERNAL MEDICINE

## 2024-09-19 NOTE — RESULT ENCOUNTER NOTE
Cfalled pt reviewed labs - PSA remains stable.  We did feel a nodule on SHANELLE will see what urology says.  Uric acid is elevated, pt gets no gouty attacks.

## 2024-09-19 NOTE — PROGRESS NOTES
Results for orders placed or performed in visit on 09/19/24   Cardiac EP device report    Narrative    MDT DUAL PM/ACTIVE SYSTEM IS MRI CONDITIONAL  CARELINK TRANSMISSION: BATTERY VOLTAGE ADEQUATE. (11.5 YRS) AP 99%  3%. ALL AVAILABLE LEAD PARAMETERS WITHIN NORMAL LIMITS. NO SIGNIFICANT HIGH RATE EPISODES. NORMAL DEVICE FUNCTION.---SANDOVAL

## 2024-09-23 NOTE — PROGRESS NOTES
"UROLOGY NEW PATIENT ENCOUNTER    Benjie Ellis is a 59 y.o. male referred for prostate cancer screening    Anticoagulation: Eliquis    PSA 0.5 on 6/12/2023    PSA 0.519 on 9/18/2024    Referred by PCP for nodule felt in prostate    PVR 0 cc on 9/24/2024    Urine dip 9/24/2024: Negative leukocyte esterase, negative nitrite, trace blood        Assessment and plan:     Prostate cancer screening    Last PSA was 0.519 on 9/18/2024.  His PCP felt a nodule and referred him to clinic.    I gave him the option of pursuing MRI, but I did explain this would likely be overkill.  I explained that in the setting of a low PSA, having prostate cancer was quite unlikely.    I gave him another option of getting a PSA in 6 months and going over the results in clinic.  I explained that if the PSA is still stable, he should be able to go back to his PCP for yearly PSA testing.    Abnormal urine testing    Patient had trace blood on urine dip today.  I explained to him that I will be sending out his urine for UA micro.  I talked about the possibility of there being positive microscopic hematuria, in which case I would call him and go over the testing that he would need for microscopic hematuria workup.      PLAN  -Fu UA micro. If pos, will consider microhematuria workup.  -See AP in 6 months with PSA. If stable can go back to yearly PSA testing with PCP.            Portions of the above record have been created with voice recognition software.  Occasional wrong word or \"sound alike\" substitution may have occurred due to the inherent limitations of voice recognition software.  Read the chart carefully and recognize, using context, where substitution may have occurred.      Toby Viveros DO        Chief Complaint     Prostate cancer screening    History of Present Illness     Benjie Ellis is a 59 y.o. male presenting in consultation for prostate cancer screening.    The patient was referred by Dr. Lombardi and today's note " has been sent to the referring provider.        The following portions of the patient's history were reviewed and updated as appropriate: allergies, current medications, past family history, past medical history, past social history, past surgical history and problem list.        AUA SYMPTOM SCORE      Flowsheet Row Most Recent Value   AUA SYMPTOM SCORE    How often have you had a sensation of not emptying your bladder completely after you finished urinating? 0 (P)     How often have you had to urinate again less than two hours after you finished urinating? 0 (P)     How often have you found you stopped and started again several times when you urinate? 0 (P)     How often have you found it difficult to postpone urination? 0 (P)     How often have you had a weak urinary stream? 0 (P)     How often have you had to push or strain to begin urination? 0 (P)     How many times did you most typically get up to urinate from the time you went to bed at night until the time you got up in the morning? 1 (P)     Quality of Life: If you were to spend the rest of your life with your urinary condition just the way it is now, how would you feel about that? 0 (P)     AUA SYMPTOM SCORE 1 (P)              Review of Systems     Review of Systems   Constitutional:  Negative for chills and fever.   Respiratory:  Negative for cough and shortness of breath.    Genitourinary:  Negative for dysuria and hematuria.   Neurological:  Negative for dizziness and headaches.   Psychiatric/Behavioral:  Negative for agitation and behavioral problems.        Allergies     No Known Allergies    Physical Exam     Physical Exam  Constitutional:       General: He is not in acute distress.  HENT:      Head: Normocephalic and atraumatic.   Pulmonary:      Effort: Pulmonary effort is normal. No respiratory distress.   Abdominal:      General: Abdomen is flat.      Palpations: Abdomen is soft.      Tenderness: There is no right CVA tenderness or left CVA  "tenderness.   Skin:     General: Skin is warm and dry.   Neurological:      General: No focal deficit present.      Mental Status: He is alert and oriented to person, place, and time.   Psychiatric:         Mood and Affect: Mood normal.         Behavior: Behavior normal.             Vital Signs  Vitals:    09/24/24 0915   BP: 128/80   BP Location: Left arm   Patient Position: Sitting   Cuff Size: Large   Pulse: 82   SpO2: 97%   Weight: 126 kg (278 lb)   Height: 5' 9\" (1.753 m)         Current Medications       Current Outpatient Medications:     apixaban (Eliquis) 5 mg, Take 1 tablet by mouth twice daily, Disp: 180 tablet, Rfl: 1    clopidogrel (PLAVIX) 75 mg tablet, Take 1 tablet by mouth once daily, Disp: 90 tablet, Rfl: 3    clotrimazole-betamethasone (LOTRISONE) 1-0.05 % cream, Apply topically 2 (two) times a day, Disp: 45 g, Rfl: 0    metoprolol tartrate (LOPRESSOR) 25 mg tablet, Take 0.5 tablets (12.5 mg total) by mouth every 12 (twelve) hours, Disp: 90 tablet, Rfl: 1    rosuvastatin (CRESTOR) 20 MG tablet, Take 1 tablet by mouth once daily, Disp: 90 tablet, Rfl: 1    SEMAGLUTIDE,0.25 OR 0.5MG/DOS, SC, Inject 1 mg under the skin once a week Compound medication 1mg injection weekly., Disp: , Rfl:     Active Problems     Patient Active Problem List   Diagnosis    1st degree AV block    Bradycardia    S/P coronary artery stent placement    Coronary artery disease involving native coronary artery    Sick sinus syndrome status post dual-chamber pacemaker of [employer] with septal pacing    Sick sinus syndrome (HCC)    Jock itch    Acute gout due to renal impairment involving toe of right foot    Tubular adenoma of colon    History of PTCA stents    Hyperlipidemia    Class 3 severe obesity due to excess calories with serious comorbidity and body mass index (BMI) of 40.0 to 44.9 in adult (HCC)    Suspected sleep apnea       Past Medical History     Past Medical History:   Diagnosis Date    Hyperlipidemia     " Irregular heart beat     bradycardia post stress test, holter monitor on until 05/05/22 Dr Espinoza    Kidney stone        Surgical History     Past Surgical History:   Procedure Laterality Date    BICEPS TENDON REPAIR Bilateral     CARDIAC CATHETERIZATION N/A 07/20/2022    Procedure: Cardiac Coronary Angiogram;  Surgeon: Mercedez Espinoza MD;  Location: [facility];  Service: Cardiology    CARDIAC CATHETERIZATION Left 07/20/2022    Procedure: Cardiac Left Heart Cath;  Surgeon: Mercedez Espinoza MD;  [facility];  Service: Cardiology    CARDIAC CATHETERIZATION N/A 07/20/2022    Procedure: Cardiac pci;  Surgeon: Mercedez Espinoza MD;  [facility];  Service: Cardiology    CARDIAC ELECTROPHYSIOLOGY PROCEDURE N/A 08/04/2022    Procedure: Cardiac pacer implant;  Surgeon: Mercedez Espinoza MD;  Location: [facility];  Service: Cardiology    CARDIAC PACEMAKER PLACEMENT  08/04/2022    INGUINAL HERNIA REPAIR      LASIK Bilateral     ROTATOR CUFF REPAIR Right     VASECTOMY           Family History     Family History   Problem Relation Age of Onset    Hypertension Mother     Diabetes Mother     Hypertension Father     Colon polyps Father     Mental illness Neg Hx        Social History     Social History     Social History     Tobacco Use   Smoking Status Never   Smokeless Tobacco Never       Pertinent Lab Values     Lab Results   Component Value Date    CREATININE 1.04 09/18/2024       Lab Results   Component Value Date    PSA 0.519 09/18/2024    PSA 0.5 06/12/2023         Pertinent Imaging     N/A      Pertinent Pathology     N/A

## 2024-09-24 ENCOUNTER — CONSULT (OUTPATIENT)
Dept: UROLOGY | Facility: CLINIC | Age: 59
End: 2024-09-24
Payer: COMMERCIAL

## 2024-09-24 VITALS
WEIGHT: 278 LBS | DIASTOLIC BLOOD PRESSURE: 80 MMHG | OXYGEN SATURATION: 97 % | SYSTOLIC BLOOD PRESSURE: 128 MMHG | HEIGHT: 69 IN | HEART RATE: 82 BPM | BODY MASS INDEX: 41.18 KG/M2

## 2024-09-24 DIAGNOSIS — Z12.5 SCREENING FOR PROSTATE CANCER: Primary | ICD-10-CM

## 2024-09-24 DIAGNOSIS — R82.90 ABNORMAL RESULT ON SCREENING URINE TEST: ICD-10-CM

## 2024-09-24 LAB
BACTERIA UR QL AUTO: ABNORMAL /HPF
BILIRUB UR QL STRIP: NEGATIVE
CLARITY UR: CLEAR
COLOR UR: ABNORMAL
GLUCOSE UR STRIP-MCNC: NEGATIVE MG/DL
HGB UR QL STRIP.AUTO: NEGATIVE
HYALINE CASTS #/AREA URNS LPF: ABNORMAL /LPF
KETONES UR STRIP-MCNC: NEGATIVE MG/DL
LEUKOCYTE ESTERASE UR QL STRIP: NEGATIVE
MUCOUS THREADS UR QL AUTO: ABNORMAL
NITRITE UR QL STRIP: NEGATIVE
NON-SQ EPI CELLS URNS QL MICRO: ABNORMAL /HPF
PH UR STRIP.AUTO: 6 [PH]
POST-VOID RESIDUAL VOLUME, ML POC: 0 ML
PROT UR STRIP-MCNC: ABNORMAL MG/DL
RBC #/AREA URNS AUTO: ABNORMAL /HPF
SL AMB  POCT GLUCOSE, UA: NORMAL
SL AMB LEUKOCYTE ESTERASE,UA: NORMAL
SL AMB POCT BILIRUBIN,UA: NORMAL
SL AMB POCT BLOOD,UA: NORMAL
SL AMB POCT CLARITY,UA: CLEAR
SL AMB POCT COLOR,UA: YELLOW
SL AMB POCT KETONES,UA: NORMAL
SL AMB POCT NITRITE,UA: NORMAL
SL AMB POCT PH,UA: 5
SL AMB POCT SPECIFIC GRAVITY,UA: 1.02
SL AMB POCT URINE PROTEIN: NORMAL
SL AMB POCT UROBILINOGEN: NORMAL
SP GR UR STRIP.AUTO: 1.01 (ref 1–1.03)
UROBILINOGEN UR STRIP-ACNC: <2 MG/DL
WBC #/AREA URNS AUTO: ABNORMAL /HPF

## 2024-09-24 PROCEDURE — 81002 URINALYSIS NONAUTO W/O SCOPE: CPT | Performed by: UROLOGY

## 2024-09-24 PROCEDURE — 51798 US URINE CAPACITY MEASURE: CPT | Performed by: UROLOGY

## 2024-09-24 PROCEDURE — 81001 URINALYSIS AUTO W/SCOPE: CPT | Performed by: UROLOGY

## 2024-09-24 PROCEDURE — 99203 OFFICE O/P NEW LOW 30 MIN: CPT | Performed by: UROLOGY

## 2024-09-24 NOTE — PATIENT INSTRUCTIONS
Please come back to clinic in around 6 months with PSA beforehand    I will call you if there are any issues with your urine test results

## 2024-10-01 LAB
APOB+LDLR+PCSK9 GENE MUT ANL BLD/T: NOT DETECTED
BRCA1+BRCA2 DEL+DUP + FULL MUT ANL BLD/T: NOT DETECTED
MLH1+MSH2+MSH6+PMS2 GN DEL+DUP+FUL M: NOT DETECTED

## 2024-10-02 ENCOUNTER — OFFICE VISIT (OUTPATIENT)
Dept: BARIATRICS | Facility: CLINIC | Age: 59
End: 2024-10-02
Payer: COMMERCIAL

## 2024-10-02 VITALS
SYSTOLIC BLOOD PRESSURE: 124 MMHG | DIASTOLIC BLOOD PRESSURE: 88 MMHG | BODY MASS INDEX: 41.03 KG/M2 | HEIGHT: 69 IN | HEART RATE: 78 BPM | WEIGHT: 277 LBS

## 2024-10-02 DIAGNOSIS — E66.01 CLASS 3 SEVERE OBESITY DUE TO EXCESS CALORIES WITH SERIOUS COMORBIDITY AND BODY MASS INDEX (BMI) OF 40.0 TO 44.9 IN ADULT (HCC): Primary | ICD-10-CM

## 2024-10-02 DIAGNOSIS — E66.813 CLASS 3 SEVERE OBESITY DUE TO EXCESS CALORIES WITH SERIOUS COMORBIDITY AND BODY MASS INDEX (BMI) OF 40.0 TO 44.9 IN ADULT (HCC): Primary | ICD-10-CM

## 2024-10-02 PROCEDURE — 99214 OFFICE O/P EST MOD 30 MIN: CPT | Performed by: NURSE PRACTITIONER

## 2024-10-02 NOTE — PROGRESS NOTES
Assessment/Plan:     Class 3 severe obesity due to excess calories with serious comorbidity and body mass index (BMI) of 40.0 to 44.9 in adult (HCC)  - Patient is pursuing conservative plan and follow up visits with medical weight management provider  - Initial weight loss goal of 5-10% weight loss for improved health. Weight loss can improve patient's co-morbid conditions and/or prevent weight-related complications.  - Explained the importance of continuing lifestyle changes in addition to any anti-obesity medications.   - Labs reviewed from 9/2024    General Recommendations:  Nutrition:  Eat breakfast daily.  Do not skip meals.      Food log (ie.) www.[employer].com, sparkpeople.com, loseit.com, [employer].com, etc.     Practice mindful eating.  Be sure to set aside time to eat, eat slowly, and savor your food.     Hydration:    At least 64oz of water daily.  No sugar sweetened beverages.  No juice (eat the fruit instead).     Exercise:  Studies have shown that the ideal exercise goal is somewhere between 150 to 300 minutes of moderate intensity exercise a week.  Start with exercising 10 minutes every other day and gradually increase physical activity with a goal of at least 150 minutes of moderate intensity exercise a week, divided over at least 3 days a week.  An example of this would be exercising 30 minutes a day, 5 days a week.  Resistance training can increase muscle mass and increase our resting metabolic rate.   FULL BODY resistance training is recommended 2-3 times a week.  Do not do this on consecutive days to allow for muscle recovery.     Aim for a bare minimum 5000 steps, even on days you do not exercise.     Monitoring:   Weigh yourself daily.  If this causes undue stress, then just weigh yourself once a week.  Weigh yourself the same time of the day with the same amount of clothing on.  Preferably this should be done after waking up, before you eat, and with no clothing or minimal clothing on.    "  Specific Goals:  Calorie goal:  3497-2298 anjelica/day (Provided with meal plan to follow)   Patient lifestyle habits were reviewed.  Nutrition was discussed at length and patient was encouraged to start increasing his nutrition as I suspect he is under consuming his calories.  Patient was advised to start tracking his calories and the [employer] fitness pal brenda and to make sure he is consuming at least 1600 to 1800 anjelica/day.  He was also encouraged to distribute these calories evenly throughout the day and to avoid the bulk of his calories later in the evening.   Hydration was discussed and patient will continue to try to increase his water intake to at least 5-6 bottles of water daily.  Exercise was discussed and patient was encouraged to start an exercise program for at least 20 minutes 2 to 3 days a week to start.  Medications were discussed and patient is going to continue with compound semaglutide.  Discussed with patient that he will need to continue management with an outside company due to the risk of compounding medicine.  If he would like to pursue brand-name medication that is FDA approved we can further discuss this at future visits.         Benjie was seen today for follow-up.    Diagnoses and all orders for this visit:    Class 3 severe obesity due to excess calories with serious comorbidity and body mass index (BMI) of 40.0 to 44.9 in adult (HCC)        Total time spent reviewing chart, interviewing patient, examining patient, discussing plan, answering all questions, and documentin minutes with >50% face-to-face time with the patient.    Follow up in approximately 3 months with Non-Surgical Physician/Advanced Practitioner.    Subjective:   Chief Complaint   Patient presents with    Follow-up     Pt is here for MWM f/u. Waist - 50.7\"       Patient ID: Benjie Ellis  is a 59 y.o. male with excess weight/obesity here to pursue weight management.  Patient is pursuing Conservative Program.   Most recent notes " and records were reviewed.    HPI    Wt Readings from Last 20 Encounters:   10/02/24 126 kg (277 lb)   09/24/24 126 kg (278 lb)   09/17/24 126 kg (278 lb 6.4 oz)   08/27/24 128 kg (281 lb 6.4 oz)   08/21/24 127 kg (279 lb)   07/02/24 127 kg (280 lb 12.8 oz)   04/30/24 129 kg (284 lb)   04/10/24 126 kg (278 lb)   03/27/24 126 kg (278 lb)   01/29/24 127 kg (279 lb)   12/13/23 125 kg (275 lb)   09/07/23 123 kg (271 lb 2.7 oz)   08/22/23 123 kg (272 lb)   05/26/23 122 kg (269 lb 6 oz)   05/16/23 123 kg (272 lb)   11/14/22 122 kg (269 lb)   08/12/22 120 kg (265 lb)   08/05/22 119 kg (261 lb 14.5 oz)   07/26/22 119 kg (262 lb)   07/20/22 116 kg (255 lb 15.3 oz)       Patient presents today to medical weight management office for follow up.  Patient has lost weight since last visit with continuing on compound medication Ozempic. Patient states he has advanced to the 2 mg weekly dose and still does not see as much weight loss as he is was hoping.  He does admit that he has not been monitoring his nutrition and has been often skipping meals, not eating for most of the day.  He also states he has not been exercising as regularly as he should be.  Patient is slightly frustrated with his weight loss and would like guidance as to how to increase his weight loss rate.  Patient denies any side effects to compounded semaglutide and is going to continue with this therapy.      Weight loss medication and dose: semaglutide 2mg (compound medication)  Started weight and date: 284 lbs   Current weight: 277 lbs (280.8 lbs at last OV)  Difference: -7lbs (-3.8 lbs since last OV)  Goal weight: 1st goal 240 lbs, around 200 for ultimate     Starting BMI: 41.94 in 4/2024  Current BMI: 40.91    Waist Measurements:  7/2024: 52 in  10/2024: 50.7 in    Patient was referred by his cardiologist to try to get his weight under control. Patient has tried diets in the past and was most successful with Soraya Calle when he had complete meal replacement. He  "has tried low carb without any success.  Patient is retired but works part time. He often eats irregularly throughout the day because of his schedule. He often eats in the afternoon and late at night, skipping meals earlier in the day.       Diet recall:  B: coffee with cream  L: skips  S: protein shake and yogurt with granola  D: burgers OR meatloaf OR chicken with vegetables and rice  S: buiscuit and tea and cheese    Hydration: coffee, water -4-5 bottles, tea occasionally at night  Alcohol: no - on vacations  Smoking: no  Exercise: no  Occupation: retired (computer work)  Sleep: well  STOP ban/8      The following portions of the patient's history were reviewed and updated as appropriate: allergies, current medications, past family history, past medical history, past social history, past surgical history, and problem list.    Family History   Problem Relation Age of Onset    Hypertension Mother     Diabetes Mother     Hypertension Father     Colon polyps Father     Mental illness Neg Hx         Review of Systems   Constitutional:  Negative for fatigue.   HENT:  Negative for sore throat.    Respiratory:  Negative for cough and shortness of breath.    Cardiovascular:  Negative for chest pain, palpitations and leg swelling.   Gastrointestinal:  Negative for abdominal pain, constipation, diarrhea and nausea.   Genitourinary:  Negative for dysuria.   Musculoskeletal:  Negative for arthralgias and back pain.   Skin:  Negative for rash.   Neurological:  Negative for headaches.   Psychiatric/Behavioral:  Negative for dysphoric mood. The patient is not nervous/anxious.        Objective:  /88   Pulse 78   Ht 5' 9\" (1.753 m)   Wt 126 kg (277 lb)   BMI 40.91 kg/m²     Physical Exam  Vitals and nursing note reviewed.   Constitutional:       Appearance: Normal appearance. He is obese.   HENT:      Head: Normocephalic.   Pulmonary:      Effort: Pulmonary effort is normal.   Neurological:      General: No focal " deficit present.      Mental Status: He is alert and oriented to person, place, and time.   Psychiatric:         Mood and Affect: Mood normal.         Behavior: Behavior normal.         Thought Content: Thought content normal.         Judgment: Judgment normal.            Labs   Most recent labs reviewed   Lab Results   Component Value Date    SODIUM 136 09/18/2024    K 4.3 09/18/2024     09/18/2024    CO2 28 09/18/2024    AGAP 7 09/18/2024    BUN 15 09/18/2024    CREATININE 1.04 09/18/2024    GLUC 106 (H) 06/12/2023    GLUF 104 (H) 09/18/2024    CALCIUM 9.3 09/18/2024    AST 21 09/18/2024    ALT 36 09/18/2024    ALKPHOS 39 09/18/2024    TP 7.2 09/18/2024    TBILI 0.95 09/18/2024    EGFR 77 09/18/2024     Lab Results   Component Value Date    HGBA1C 5.2 09/18/2024     Lab Results   Component Value Date    UGO4IKUOPKHY 2.561 09/18/2024    TSH 3.100 06/12/2023     Lab Results   Component Value Date    CHOLESTEROL 129 09/18/2024     Lab Results   Component Value Date    HDL 42 09/18/2024     Lab Results   Component Value Date    TRIG 147 09/18/2024     Lab Results   Component Value Date    LDLCALC 58 09/18/2024

## 2024-10-03 NOTE — ASSESSMENT & PLAN NOTE
- Patient is pursuing conservative plan and follow up visits with medical weight management provider  - Initial weight loss goal of 5-10% weight loss for improved health. Weight loss can improve patient's co-morbid conditions and/or prevent weight-related complications.  - Explained the importance of continuing lifestyle changes in addition to any anti-obesity medications.   - Labs reviewed from 9/2024    General Recommendations:  Nutrition:  Eat breakfast daily.  Do not skip meals.      Food log (ie.) www.[employer].com, sparkpeople.com, loseit.com, calorieking.com, etc.     Practice mindful eating.  Be sure to set aside time to eat, eat slowly, and savor your food.     Hydration:    At least 64oz of water daily.  No sugar sweetened beverages.  No juice (eat the fruit instead).     Exercise:  Studies have shown that the ideal exercise goal is somewhere between 150 to 300 minutes of moderate intensity exercise a week.  Start with exercising 10 minutes every other day and gradually increase physical activity with a goal of at least 150 minutes of moderate intensity exercise a week, divided over at least 3 days a week.  An example of this would be exercising 30 minutes a day, 5 days a week.  Resistance training can increase muscle mass and increase our resting metabolic rate.   FULL BODY resistance training is recommended 2-3 times a week.  Do not do this on consecutive days to allow for muscle recovery.     Aim for a bare minimum 5000 steps, even on days you do not exercise.     Monitoring:   Weigh yourself daily.  If this causes undue stress, then just weigh yourself once a week.  Weigh yourself the same time of the day with the same amount of clothing on.  Preferably this should be done after waking up, before you eat, and with no clothing or minimal clothing on.     Specific Goals:  Calorie goal:  9396-5242 anjelica/day (Provided with meal plan to follow)   Patient lifestyle habits were reviewed.  Nutrition was  discussed at length and patient was encouraged to start increasing his nutrition as I suspect he is under consuming his calories.  Patient was advised to start tracking his calories and the [employer] fitness pal brenda and to make sure he is consuming at least 1600 to 1800 anjelica/day.  He was also encouraged to distribute these calories evenly throughout the day and to avoid the bulk of his calories later in the evening.   Hydration was discussed and patient will continue to try to increase his water intake to at least 5-6 bottles of water daily.  Exercise was discussed and patient was encouraged to start an exercise program for at least 20 minutes 2 to 3 days a week to start.  Medications were discussed and patient is going to continue with compound semaglutide.  Discussed with patient that he will need to continue management with an outside company due to the risk of compounding medicine.  If he would like to pursue brand-name medication that is FDA approved we can further discuss this at future visits.

## 2024-10-10 DIAGNOSIS — I48.91 ATRIAL FIBRILLATION, UNSPECIFIED TYPE (HCC): ICD-10-CM

## 2024-10-10 RX ORDER — APIXABAN 5 MG/1
5 TABLET, FILM COATED ORAL 2 TIMES DAILY
Qty: 180 TABLET | Refills: 1 | Status: SHIPPED | OUTPATIENT
Start: 2024-10-10

## 2024-12-08 DIAGNOSIS — E78.5 DYSLIPIDEMIA: ICD-10-CM

## 2024-12-09 RX ORDER — ROSUVASTATIN CALCIUM 20 MG/1
20 TABLET, COATED ORAL DAILY
Qty: 90 TABLET | Refills: 1 | Status: SHIPPED | OUTPATIENT
Start: 2024-12-09

## 2024-12-19 ENCOUNTER — REMOTE DEVICE CLINIC VISIT (OUTPATIENT)
Dept: CARDIOLOGY CLINIC | Facility: CLINIC | Age: 59
End: 2024-12-19
Payer: COMMERCIAL

## 2024-12-19 ENCOUNTER — RESULTS FOLLOW-UP (OUTPATIENT)
Dept: CARDIOLOGY CLINIC | Facility: CLINIC | Age: 59
End: 2024-12-19

## 2024-12-19 DIAGNOSIS — Z95.0 PRESENCE OF PERMANENT CARDIAC PACEMAKER: Primary | ICD-10-CM

## 2024-12-19 PROCEDURE — 93294 REM INTERROG EVL PM/LDLS PM: CPT | Performed by: INTERNAL MEDICINE

## 2024-12-19 PROCEDURE — 93296 REM INTERROG EVL PM/IDS: CPT | Performed by: INTERNAL MEDICINE

## 2024-12-19 NOTE — RESULT ENCOUNTER NOTE
Patient's device interrogation reading shows,  device function is normal.  Patient should keep appointment with cardiology      Please call patient.

## 2024-12-19 NOTE — PROGRESS NOTES
Results for orders placed or performed in visit on 12/19/24   Cardiac EP device report    Narrative    MDT DUAL PM/ACTIVE SYSTEM IS MRI CONDITIONAL  CARELINK TRANSMISSION: BATTERY VOLTAGE ADEQUATE. (11.2 YRS) AP 99%  1%. ALL AVAILABLE LEAD PARAMETERS WITHIN NORMAL LIMITS. NO SIGNIFICANT HIGH RATE EPISODES. 1 AT/AF EPISODE DETECTED LASTING 30 SEC. NORMAL DEVICE FUNCTION.---SANDOVAL

## 2025-03-07 DIAGNOSIS — I48.0 PAROXYSMAL A-FIB (HCC): ICD-10-CM

## 2025-03-07 RX ORDER — METOPROLOL TARTRATE 25 MG/1
12.5 TABLET, FILM COATED ORAL EVERY 12 HOURS
Qty: 90 TABLET | Refills: 1 | Status: SHIPPED | OUTPATIENT
Start: 2025-03-07

## 2025-03-20 DIAGNOSIS — I25.10 CORONARY ARTERY DISEASE INVOLVING NATIVE CORONARY ARTERY OF NATIVE HEART WITHOUT ANGINA PECTORIS: ICD-10-CM

## 2025-03-20 DIAGNOSIS — Z95.5 S/P CORONARY ARTERY STENT PLACEMENT: ICD-10-CM

## 2025-03-21 ENCOUNTER — REMOTE DEVICE CLINIC VISIT (OUTPATIENT)
Dept: CARDIOLOGY CLINIC | Facility: CLINIC | Age: 60
End: 2025-03-21
Payer: COMMERCIAL

## 2025-03-21 ENCOUNTER — RESULTS FOLLOW-UP (OUTPATIENT)
Dept: CARDIOLOGY CLINIC | Facility: CLINIC | Age: 60
End: 2025-03-21

## 2025-03-21 DIAGNOSIS — Z95.0 PRESENCE OF PERMANENT CARDIAC PACEMAKER: Primary | ICD-10-CM

## 2025-03-21 PROCEDURE — 93294 REM INTERROG EVL PM/LDLS PM: CPT | Performed by: INTERNAL MEDICINE

## 2025-03-21 PROCEDURE — 93296 REM INTERROG EVL PM/IDS: CPT | Performed by: INTERNAL MEDICINE

## 2025-03-21 RX ORDER — CLOPIDOGREL BISULFATE 75 MG/1
75 TABLET ORAL DAILY
Qty: 30 TABLET | Refills: 0 | Status: SHIPPED | OUTPATIENT
Start: 2025-03-21

## 2025-03-21 NOTE — PROGRESS NOTES
Results for orders placed or performed in visit on 03/21/25   Cardiac EP device report    Narrative    MDT DUAL PM/ACTIVE SYSTEM IS MRI CONDITIONAL  CARELINK TRANSMISSION: BATTERY VOLTAGE ADEQUATE. (11 YRS) AP 98%  2%. ALL AVAILABLE LEAD PARAMETERS WITHIN NORMAL LIMITS. NO SIGNIFICANT HIGH RATE EPISODES. NORMAL DEVICE FUNCTION.---SANDOVAL

## 2025-03-24 ENCOUNTER — OFFICE VISIT (OUTPATIENT)
Dept: UROLOGY | Facility: CLINIC | Age: 60
End: 2025-03-24
Payer: COMMERCIAL

## 2025-03-24 VITALS
BODY MASS INDEX: 40.88 KG/M2 | HEIGHT: 69 IN | OXYGEN SATURATION: 98 % | DIASTOLIC BLOOD PRESSURE: 70 MMHG | WEIGHT: 276 LBS | SYSTOLIC BLOOD PRESSURE: 132 MMHG | HEART RATE: 88 BPM

## 2025-03-24 DIAGNOSIS — Z12.5 SCREENING FOR PROSTATE CANCER: Primary | ICD-10-CM

## 2025-03-24 PROCEDURE — 99213 OFFICE O/P EST LOW 20 MIN: CPT

## 2025-03-24 NOTE — PROGRESS NOTES
Name: Benjie Ellis      : 1965      MRN: [id number]  Encounter Provider: DRU Foy  Encounter Date: 3/24/2025   Encounter department: [facility]  :  Assessment & Plan  Screening for prostate cancer  -Previous PSA 0.519 completed on 24.  -Denies family history of  malignancy.  -Did not repeat PSA prior to today's visit, he will repeat this now.  We will notify him with results.  He would like to continue annual surveillance of prostate cancer screening with our service.  We will also plan to see him back in 1 year for follow-up.  We discussed if PSA is elevated, we will then obtain an MRI of the prostate to further evaluate.  -All questions addressed.  Please do not hesitate to reach out with further questions or concerns.  Orders:    PSA Total, Diagnostic; Future        History of Present Illness   Benjie Ellis is a 60 y.o. male who presents for follow-up of prostate cancer screening.  Patient was seen in consultation by Dr. Viveros in 2024, after PCP felt a nodule on SHANELLE and referred him to our clinic.  Dr. Viveros explained in the setting of a low PSA, having prostate cancer was quite unlikely.  Patient was to repeat PSA prior to visit today, however, did not complete this yet.    Denies urinary symptoms or voiding complaints.  Denies family history of  malignancy.      Urinalysis negative for microscopic hematuria 24.    Previous PSA 0.519 completed on 24.  AUA SYMPTOM SCORE      Flowsheet Row Most Recent Value   AUA SYMPTOM SCORE    How often have you had a sensation of not emptying your bladder completely after you finished urinating? 0 (P)     How often have you had to urinate again less than two hours after you finished urinating? 0 (P)     How often have you found you stopped and started again several times when you urinate? 0 (P)     How often have you found it difficult to postpone urination? 0 (P)     How often have you had  "a weak urinary stream? 0 (P)     How often have you had to push or strain to begin urination? 0 (P)     How many times did you most typically get up to urinate from the time you went to bed at night until the time you got up in the morning? 1 (P)     Quality of Life: If you were to spend the rest of your life with your urinary condition just the way it is now, how would you feel about that? 0 (P)     AUA SYMPTOM SCORE 1 (P)            Review of Systems   Constitutional:  Negative for activity change, chills, fatigue and fever.   HENT:  Negative for congestion, rhinorrhea and sore throat.    Eyes:  Negative for photophobia, redness and visual disturbance.   Respiratory:  Negative for cough, shortness of breath and wheezing.    Cardiovascular:  Negative for chest pain, palpitations and leg swelling.   Gastrointestinal:  Negative for abdominal pain, diarrhea, nausea and vomiting.   Genitourinary:  Negative for difficulty urinating, dysuria, flank pain, frequency, hematuria and urgency.   Neurological:  Negative for weakness, light-headedness and headaches.          Objective   /70 (BP Location: Left arm, Patient Position: Sitting, Cuff Size: Large)   Pulse 88   Ht 5' 9\" (1.753 m)   Wt 125 kg (276 lb)   SpO2 98%   BMI 40.76 kg/m²     Physical Exam  Vitals and nursing note reviewed.   Constitutional:       General: He is not in acute distress.     Appearance: He is well-developed.   HENT:      Head: Normocephalic and atraumatic.   Eyes:      Conjunctiva/sclera: Conjunctivae normal.   Cardiovascular:      Rate and Rhythm: Normal rate and regular rhythm.      Heart sounds: No murmur heard.  Pulmonary:      Effort: Pulmonary effort is normal. No respiratory distress.      Breath sounds: Normal breath sounds.   Abdominal:      Palpations: Abdomen is soft.      Tenderness: There is no abdominal tenderness.   Musculoskeletal:         General: No swelling.      Cervical back: Neck supple.   Skin:     General: Skin is " warm and dry.      Capillary Refill: Capillary refill takes less than 2 seconds.   Neurological:      Mental Status: He is alert.   Psychiatric:         Mood and Affect: Mood normal.          Results   Lab Results   Component Value Date    PSA 0.519 09/18/2024    PSA 0.5 06/12/2023     Lab Results   Component Value Date    CALCIUM 9.3 09/18/2024    K 4.3 09/18/2024    CO2 28 09/18/2024     09/18/2024    BUN 15 09/18/2024    CREATININE 1.04 09/18/2024     Lab Results   Component Value Date    WBC 6.31 09/18/2024    HGB 15.3 09/18/2024    HCT 44.3 09/18/2024    MCV 87 09/18/2024     09/18/2024       Office Urine Dip  No results found for this or any previous visit (from the past hour).

## 2025-04-02 DIAGNOSIS — I48.91 ATRIAL FIBRILLATION, UNSPECIFIED TYPE (HCC): ICD-10-CM

## 2025-04-02 RX ORDER — APIXABAN 5 MG/1
5 TABLET, FILM COATED ORAL 2 TIMES DAILY
Qty: 180 TABLET | Refills: 0 | Status: SHIPPED | OUTPATIENT
Start: 2025-04-02

## 2025-04-09 ENCOUNTER — OFFICE VISIT (OUTPATIENT)
Dept: CARDIOLOGY CLINIC | Facility: CLINIC | Age: 60
End: 2025-04-09
Payer: COMMERCIAL

## 2025-04-09 VITALS
SYSTOLIC BLOOD PRESSURE: 120 MMHG | BODY MASS INDEX: 41.62 KG/M2 | WEIGHT: 281 LBS | HEART RATE: 76 BPM | OXYGEN SATURATION: 98 % | HEIGHT: 69 IN | DIASTOLIC BLOOD PRESSURE: 80 MMHG

## 2025-04-09 DIAGNOSIS — Z95.0 CARDIAC PACEMAKER IN SITU: ICD-10-CM

## 2025-04-09 DIAGNOSIS — E66.813 CLASS 3 SEVERE OBESITY WITH BODY MASS INDEX (BMI) OF 40.0 TO 44.9 IN ADULT, UNSPECIFIED OBESITY TYPE, UNSPECIFIED WHETHER SERIOUS COMORBIDITY PRESENT: ICD-10-CM

## 2025-04-09 DIAGNOSIS — Z95.5 S/P CORONARY ARTERY STENT PLACEMENT: ICD-10-CM

## 2025-04-09 DIAGNOSIS — E78.5 DYSLIPIDEMIA: ICD-10-CM

## 2025-04-09 DIAGNOSIS — I48.0 PAROXYSMAL A-FIB (HCC): ICD-10-CM

## 2025-04-09 DIAGNOSIS — I25.10 CORONARY ARTERY DISEASE INVOLVING NATIVE CORONARY ARTERY OF NATIVE HEART WITHOUT ANGINA PECTORIS: ICD-10-CM

## 2025-04-09 DIAGNOSIS — R06.02 EXERTIONAL SHORTNESS OF BREATH: ICD-10-CM

## 2025-04-09 PROCEDURE — 93000 ELECTROCARDIOGRAM COMPLETE: CPT | Performed by: INTERNAL MEDICINE

## 2025-04-09 PROCEDURE — 99214 OFFICE O/P EST MOD 30 MIN: CPT | Performed by: INTERNAL MEDICINE

## 2025-04-09 NOTE — PROGRESS NOTES
Progress note - Cardiology Office  [facility].    Benjie Ellis 60 y.o. male MRN: [id number]  : 1965  Unit/Bed#:  Encounter: [id number]      Assessment:     1. Exertional shortness of breath    2. Paroxysmal A-fib (HCC)    3. Coronary artery disease involving native coronary artery of native heart without angina pectoris    4. Dyslipidemia    5. S/P coronary artery stent placement    6. Cardiac pacemaker in situ    7. Class 3 severe obesity with body mass index (BMI) of 40.0 to 44.9 in adult, unspecified obesity type, unspecified whether serious comorbidity present (HCC)        Discussion summary and Plan:    1. Exertional shortness of breath.  Patient exertional shortness of breath is no longer present.  He had a successful angioplasty of his codominant RCA and has distal circ 100% occluded which is chronically occluded continue medical Rx.  Nonobstructive disease of LAD noted.  Continue Plavix and Eliquis.  No current issues.    2. Sick sinus syndrome with frequent PACs.  Pacemaker is functioning fine he is around 98% atrial paced.  Has 11 years of battery interrogation reviewed.  Interrogation reviewed from March.    3. Dyslipidemia.  Blood test from 2024 shows cholesterol profile acceptable continue Crestor.    4. Coronary artery disease status post angioplasty of codominant RCA and has 100% occluded distal circumflex which will be managed with medical therapy continue dual platelet therapy.  No more symptoms of angina normal EF.    5. Obesity with BMI around 41.  Encouraged him to lose weight.  Is working on losing weight.    6. History of marijuana use .  Patient has quit smoking marijuana.      7. Possible sleep apnea.  Possible sleep apnea he is elected to sleep study is that he may not use CPAP machine.  Encouraged him to get sleep study done.    8.  PAF.  Patient has not had any episode of atrial fibrillation since his last interrogation.  Fib load is less than 1%.   We can continue to monitor if no significant A-fib is noted we may discontinue Eliquis at some point.    Continue current Rx patient doing well from cardiac point of view.  Leg edema has improved.            Patient was advised and educated to call our office  immediately if  patient has any new symptoms of chest pain/shortness of breath, near-syncope, syncope, light headedness sustained palpitations  or any other cardiovascular symptoms before their scheduled follow-up appointment.  Office #[phone].    Thank you for your consultation.  If you have any question please call me at [phone]    Counseling :  A description of the counseling.  Goals and Barriers.  Patient's ability to self care: Yes  Medication side effect reviewed with patient in detail and all their questions answered to their satisfaction.      Primary Care Physician : Frank Lombardi, DO      HPI :     Benjie Ellis is a 60 y.o. year old male who was referred by primary care doctor for exertional shortness of breath and palpitations.  Patient who has medical history significant for bradycardia, obesity with BMI around 39 has noted lately he is having exertional shortness of breath and then racing of his heart.  He had gained about 10-15 lb in the last year and since then he has noted when he climbs stairs or he walks he gets short of breath and occasionally get palpitations.  He was noted to have first-degree AV block as well as episodes of bradycardia today also his heart rate is 56 beats per minute he has sinus arrhythmia.  And first-degree AV block EKG also shows T-wave inversions and ST flattening in inferior leads along with Q-waves in inferior leads.  He denies any personal history of MI in the past.  There is no family history of premature coronary artery disease but his mother did have pacemaker.     Other medical history significant for history of kidney stones.    He never smoked but lately he started using marijuana mostly in the  evening for some time to sleep.  No nausea no vomiting no fever no chills no leg edema.    Past surgical history.  Multiple orthopedic surgery including shoulder surgery and biceps surgery.    06/29/2022.    Above reviewed.  Patient was initially seen by us for exertion shortness of breath and palpitations.  He also had history of bradycardia.  Holter monitor shows his average heart rate was 49 beats per minute and he has frequent PACs defer 7.6% of his total beats.  He underwent nuclear stress test.  He tried walking on the treadmill he did walk for 8 minutes and he could only achieve 73% of his maximum predicted heart rate and then he became bradycardic.  He did with symptoms of dizziness and lightheadedness and he was in junctional bradycardia and test was changed to pharmacological test.  Which shows he had a EKG changes as well as ischemia.  Holter monitor shows patient has bradycardia for 35 hours per  He admits that he snores at night.  He feels he has more easy fatigue and tired but denies any symptoms of dizziness or lightheadedness.  He has not passed out.  There is a family history of pacemaker his mother also had a pacemaker and his father also had a pacemaker at some point of his life.      12/13/2023.    Above reviewed.  Patient came for follow-up.  He has medical history significant for sick sinus syndrome s/p pacemaker, CAD with a history of angioplasty of RCA with distal circumflex chronically occluded, hypertensive heart disease, possible obstructive sleep apnea, obesity who came for follow-up.  His device was interrogated in November 2023.  He is using pacemaker about 85% of the time.  He still occasionally uses marijuana.  His blood test from 6/12/2023 reviewed.  His electrolyte profile acceptable vitals are stable.  He feels great denies any new symptoms no chest pain no shortness of breath no other cardiovascular issues at this time.    8/21/2024    Above reviewed.  Patient came for follow-up.  He has medical history significant for sick sinus syndrome s/p pacemaker, CAD with a history of angioplasty of RCA with distal circumflex chronically occluded, hypertensive heart disease, possible obstructive sleep apnea, obesity who came for follow-up.  His last blood test was June 2023.  His device was interrogated in April 2024 and he is about 98% atrial paced.  He has echo Doppler done in April 2024 EF 65%, mild TR trace to mild MR and mild aortic valve sclerosis.  He is motivated to lose weight he has seen bariatric surgery.  He does not smoke marijuana now.  He has been on semaglutide at a low dose not able to lose much weight yet.  He is working with bariatric surgery.  He is compliant with his Crestor medication Plavix and Eliquis and metoprolol heart rate today is 80 bpm he is atrial paced.    4/9/2025.    Labs reviewed.  Patient came for follow-up.  Patient has medical history significant for CAD s/p angioplasty of RCA and distal complex chronically occluded, sick sinus syndrome status post pacemaker, hypertensive heart disease, obesity with a BMI now around 41, obstructive sleep apnea, history of marijuana use, who came for follow-up.  His previous echo has shown that his EF is normal he is mild LV disease with mild aortic sclerosis and mild MR. Interrogation from March 2025 reviewed.  He is around 98% atrial paced EKG today shows atrial paced heart rate 76 bpm.  He is working on losing weight.  His blood test done 9/18/2024 which was acceptable.      Review of Systems   Constitutional:  Negative for activity change, chills, diaphoresis, fever and unexpected weight change.   HENT:  Negative for congestion.    Eyes:  Negative for discharge and redness.   Respiratory:  Negative for cough, chest tightness, shortness of breath and wheezing.    Cardiovascular: Negative.  Negative for chest pain, palpitations and leg swelling.   Gastrointestinal:  Negative for abdominal pain, diarrhea and nausea.   Endocrine:  Negative.    Genitourinary:  Negative for decreased urine volume and urgency.   Musculoskeletal: Negative.  Negative for arthralgias, back pain and gait problem.   Skin:  Negative for rash and wound.   Allergic/Immunologic: Negative.    Neurological:  Negative for dizziness, seizures, syncope, weakness, light-headedness and headaches.   Hematological: Negative.    Psychiatric/Behavioral:  Negative for agitation and confusion. The patient is not nervous/anxious.        Historical Information   Past Medical History:   Diagnosis Date   • Hyperlipidemia    • Irregular heart beat     bradycardia post stress test, holter monitor on until 05/05/22 Dr Espinoza   • Kidney stone      Past Surgical History:   Procedure Laterality Date   • BICEPS TENDON REPAIR Bilateral    • CARDIAC CATHETERIZATION N/A 07/20/2022    Procedure: Cardiac Coronary Angiogram;  Surgeon: Mercedez Espinoza MD;  Location: [facility];  Service: Cardiology   • CARDIAC CATHETERIZATION Left 07/20/2022    Procedure: Cardiac Left Heart Cath;  Surgeon: Mercedez Espinoza MD;  [facility];  Service: Cardiology   • CARDIAC CATHETERIZATION N/A 07/20/2022    Procedure: Cardiac pci;  Surgeon: Mercedez Espinoza MD;  [facility];  Service: Cardiology   • CARDIAC ELECTROPHYSIOLOGY PROCEDURE N/A 08/04/2022    Procedure: Cardiac pacer implant;  Surgeon: Mercedez Espinoza MD;  Location: [facility];  Service: Cardiology   • CARDIAC PACEMAKER PLACEMENT  08/04/2022   • INGUINAL HERNIA REPAIR     • LASIK Bilateral    • ROTATOR CUFF REPAIR Right    • VASECTOMY       Social History     Substance and Sexual Activity   Alcohol Use Not Currently    Comment: rare     Social History     Substance and Sexual Activity   Drug Use Not Currently   • Types: Marijuana     Social History     Tobacco Use   Smoking Status Never   Smokeless Tobacco Never     Family History:   Family History   Problem Relation Age of Onset   • Hypertension Mother   "  • Diabetes Mother    • Hypertension Father    • Colon polyps Father    • Mental illness Neg Hx        Meds/Allergies     No Known Allergies    Current Outpatient Medications:   •  clopidogrel (PLAVIX) 75 mg tablet, Take 1 tablet (75 mg total) by mouth daily, Disp: 30 tablet, Rfl: 0  •  Eliquis 5 MG, Take 1 tablet by mouth twice daily, Disp: 180 tablet, Rfl: 0  •  metoprolol tartrate (LOPRESSOR) 25 mg tablet, TAKE 1/2 (ONE-HALF) TABLET BY MOUTH EVERY 12 HOURS, Disp: 90 tablet, Rfl: 1  •  rosuvastatin (CRESTOR) 20 MG tablet, Take 1 tablet by mouth once daily, Disp: 90 tablet, Rfl: 1  •  semaglutide, 0.25 or 0.5 mg/dose, (Ozempic) 2 mg/3 mL injection pen, Inject 1 mg under the skin once a week Compound medication 1mg injection weekly., Disp: , Rfl:   •  clotrimazole-betamethasone (LOTRISONE) 1-0.05 % cream, Apply topically 2 (two) times a day (Patient not taking: Reported on 10/2/2024), Disp: 45 g, Rfl: 0    Vitals: Blood pressure 120/80, pulse 76, height 5' 9\" (1.753 m), weight 127 kg (281 lb), SpO2 98%.    Body mass index is 41.5 kg/m².  Wt Readings from Last 3 Encounters:   04/09/25 127 kg (281 lb)   03/24/25 125 kg (276 lb)   10/02/24 126 kg (277 lb)     Vitals:    04/09/25 1444   Weight: 127 kg (281 lb)         BP Readings from Last 3 Encounters:   04/09/25 120/80   03/24/25 132/70   10/02/24 124/88         Physical Exam  Constitutional:       General: He is not in acute distress.     Appearance: He is well-developed. He is not diaphoretic.   Neck:      Thyroid: No thyromegaly.      Vascular: No JVD.      Trachea: No tracheal deviation.   Cardiovascular:      Rate and Rhythm: Normal rate and regular rhythm.      Heart sounds: S1 normal and S2 normal. Heart sounds not distant. Murmur heard.      Systolic (ejection) murmur is present with a grade of 2/6.      No friction rub. No gallop. No S3 or S4 sounds.   Pulmonary:      Effort: Pulmonary effort is normal. No respiratory distress.      Breath sounds: Normal " breath sounds. No wheezing or rales.   Chest:      Chest wall: No tenderness.   Abdominal:      General: Bowel sounds are normal. There is no distension.      Palpations: Abdomen is soft.      Tenderness: There is no abdominal tenderness. There is no rebound.   Musculoskeletal:         General: No tenderness or deformity. Normal range of motion.      Cervical back: Normal range of motion and neck supple.   Skin:     General: Skin is warm and dry.      Coloration: Skin is not pale.      Findings: No rash.   Neurological:      Mental Status: He is alert and oriented to person, place, and time.   Psychiatric:         Behavior: Behavior normal.         Judgment: Judgment normal.            Diagnostic Studies Review Cardio:    Echo Doppler 05/03/2022 shows normal LV systolic function EF 60-65%, borderline LV thickness.  No significant valvular disease.    Nuclear stress test.  Nuclear stress test shows small reversible ischemia in the inferior wall with sum score of 5. Patient did have some junctional rhythm during recovery along with bradycardia.  EKG shows 1 mm ST depression which was upsloping and horizontal.  EF was 64%.      Holter monitor shows patient has average heart rate is 49 beats per minute.  Maximum heart rate was 114 beats per minute.  Patient has frequent PACs there were 7.6% of the total beats.  Patient was in bradycardia for 35 hours.  He has a 2.9 seconds pause during sleep hours.      EKG:    Twelve lead EKG done on 04/07/2022 shows sinus bradycardia heart rate 56 beats per minute first-degree AV block.  Q-wave noted in inferior leads with ST flattening cannot rule out inferior wall infarction    Lead EKG 08/12/2022 shows atrial paced rhythm ventricular sensed.    Twelve-lead EKG 5/16/2022 shows atrial paced ventricular sensed heart rate 81 bpm.    Twelve-lead EKG done on 12/13/2023 shows atrial paced ventricular sensed heart rate 85 bpm.    Twelve-lead EKG done on 8/21/2024 atrial paced heart rate 80  "bpm.  Not much change from previous EKG.    Twelve-lead EKG 4/9/2025 atrial paced heart rate 76 bpm Q wave noted.  Lead most like pseudo infarct pattern.  No change from previous EKG.      Pacemaker interrogation.  Device interrogation done in September 2022 patient is using pacemaker 86% of time in the atrium and 2% ventricular.  One episode of SVT noted.    Pacemaker interrogation in February 2023 pacemaker is functioning adequately.  Around 85% atrial paced episode of 1 beats noted.    Imaging:  Chest X-Ray:   No Chest XR results available for this patient.    CT-scan of the chest:     No CTA results available for this patient.  Lab Review     BMP:  Lab Results   Component Value Date    SODIUM 136 09/18/2024    K 4.3 09/18/2024     09/18/2024    CO2 28 09/18/2024    BUN 15 09/18/2024    CREATININE 1.04 09/18/2024    GLUC 106 (H) 06/12/2023    GLUF 104 (H) 09/18/2024    CALCIUM 9.3 09/18/2024    EGFR 77 09/18/2024     Troponins:    LFT:  Lab Results   Component Value Date    AST 21 09/18/2024    ALT 36 09/18/2024    ALKPHOS 39 09/18/2024    TP 7.2 09/18/2024    ALB 4.3 09/18/2024      Lipid Profile:   Lab Results   Component Value Date    CHOLESTEROL 129 09/18/2024    HDL 42 09/18/2024    LDLCALC 58 09/18/2024    TRIG 147 09/18/2024     Lab Results   Component Value Date    CHOLESTEROL 129 09/18/2024    CHOLESTEROL 154 06/12/2023     The ASCVD Risk score ([place] DK, et al., 2019) failed to calculate for the following reasons:    The valid total cholesterol range is 130 to 320 mg/dL      Dr. Mercedez Espinoza MD [facility]      \"This note has been constructed using a voice recognition system.Therefore there may be syntax, spelling, and/or grammatical errors. Please call if you have any questions. \"  "

## 2025-04-16 DIAGNOSIS — Z95.5 S/P CORONARY ARTERY STENT PLACEMENT: ICD-10-CM

## 2025-04-16 DIAGNOSIS — I25.10 CORONARY ARTERY DISEASE INVOLVING NATIVE CORONARY ARTERY OF NATIVE HEART WITHOUT ANGINA PECTORIS: ICD-10-CM

## 2025-04-17 RX ORDER — CLOPIDOGREL BISULFATE 75 MG/1
75 TABLET ORAL DAILY
Qty: 30 TABLET | Refills: 0 | Status: SHIPPED | OUTPATIENT
Start: 2025-04-17

## 2025-05-16 DIAGNOSIS — Z95.5 S/P CORONARY ARTERY STENT PLACEMENT: ICD-10-CM

## 2025-05-16 DIAGNOSIS — I25.10 CORONARY ARTERY DISEASE INVOLVING NATIVE CORONARY ARTERY OF NATIVE HEART WITHOUT ANGINA PECTORIS: ICD-10-CM

## 2025-05-16 RX ORDER — CLOPIDOGREL BISULFATE 75 MG/1
75 TABLET ORAL DAILY
Qty: 30 TABLET | Refills: 5 | Status: SHIPPED | OUTPATIENT
Start: 2025-05-16

## 2025-05-31 DIAGNOSIS — E78.5 DYSLIPIDEMIA: ICD-10-CM

## 2025-06-01 RX ORDER — ROSUVASTATIN CALCIUM 20 MG/1
20 TABLET, COATED ORAL DAILY
Qty: 90 TABLET | Refills: 1 | Status: SHIPPED | OUTPATIENT
Start: 2025-06-01

## 2025-06-27 ENCOUNTER — REMOTE DEVICE CLINIC VISIT (OUTPATIENT)
Dept: CARDIOLOGY CLINIC | Facility: CLINIC | Age: 60
End: 2025-06-27
Payer: COMMERCIAL

## 2025-06-27 DIAGNOSIS — Z95.0 PRESENCE OF PERMANENT CARDIAC PACEMAKER: Primary | ICD-10-CM

## 2025-06-27 PROCEDURE — 93294 REM INTERROG EVL PM/LDLS PM: CPT | Performed by: INTERNAL MEDICINE

## 2025-06-27 PROCEDURE — 93296 REM INTERROG EVL PM/IDS: CPT | Performed by: INTERNAL MEDICINE

## 2025-06-27 NOTE — PROGRESS NOTES
Results for orders placed or performed in visit on 06/27/25   Cardiac EP device report    Narrative    MDT DUAL PM/ACTIVE SYSTEM IS MRI CONDITIONAL  CARELINK TRANSMISSION: BATTERY VOLTAGE ADEQUATE. (10.6 YRS) AP 98%  4%. ALL AVAILABLE LEAD PARAMETERS WITHIN NORMAL LIMITS. 1 VHR EPISODE DETECTED 5 BEATS @ 320ms. PVC COUNT 13.9/HOUR. PATIENT IS ON METOPROLOL TART AND ELIQUIS. NORMAL DEVICE FUNCTION.---SANDOVAL

## 2025-07-02 DIAGNOSIS — I48.91 ATRIAL FIBRILLATION, UNSPECIFIED TYPE (HCC): ICD-10-CM

## 2025-07-02 RX ORDER — APIXABAN 5 MG/1
5 TABLET, FILM COATED ORAL 2 TIMES DAILY
Qty: 180 TABLET | Refills: 1 | Status: SHIPPED | OUTPATIENT
Start: 2025-07-02

## (undated) DEVICE — TR BAND RADIAL ARTERY COMPRESSION DEVICE: Brand: TR BAND

## (undated) DEVICE — GUIDEWIRE LUGE 182CM 3CM J

## (undated) DEVICE — SUT SILK 0 SH 30 IN K834H

## (undated) DEVICE — RADIFOCUS GLIDEWIRE: Brand: GLIDEWIRE

## (undated) DEVICE — GUIDEWIRE WHOLEY HI TORQUE INTERM MOD J .035 145CM

## (undated) DEVICE — STERILE ICS MINOR PACK: Brand: CARDINAL HEALTH

## (undated) DEVICE — SUT VICRYL 4-0 KS 27 IN J662H

## (undated) DEVICE — MICROPUNCTURE INTRODUCER SET SILHOUETTE TRANSITIONLESS PUSH-PLUS DESIGN - STIFFENED CANNULA WITH NITINOL WIRE GUIDE: Brand: MICROPUNCTURE

## (undated) DEVICE — SUT VICRYL 2-0 SH 27 IN UNDYED J417H

## (undated) DEVICE — CATH DIAG 5FR IMPULSE 100CM FR4

## (undated) DEVICE — RUNTHROUGH NS EXTRA FLOPPY PTCA GUIDEWIRE: Brand: RUNTHROUGH

## (undated) DEVICE — MINI TREK CORONARY DILATATION CATHETER 2.0 MM X 15 MM / RAPID-EXCHANGE: Brand: MINI TREK

## (undated) DEVICE — GLIDESHEATH BASIC HYDROPHILIC COATED INTRODUCER SHEATH: Brand: GLIDESHEATH

## (undated) DEVICE — CATH GUIDE LAUNCHER 6FR EBU 3.5

## (undated) DEVICE — INTRO SHEATH PEEL AWAY 7FR

## (undated) DEVICE — RADIFOCUS OPTITORQUE ANGIOGRAPHIC CATHETER: Brand: OPTITORQUE

## (undated) DEVICE — CATH GUIDE LAUNCHER 6FR JR 4.0 SH